# Patient Record
Sex: MALE | Race: BLACK OR AFRICAN AMERICAN | NOT HISPANIC OR LATINO | URBAN - METROPOLITAN AREA
[De-identification: names, ages, dates, MRNs, and addresses within clinical notes are randomized per-mention and may not be internally consistent; named-entity substitution may affect disease eponyms.]

---

## 2019-05-01 ENCOUNTER — OFFICE VISIT (OUTPATIENT)
Dept: FAMILY MEDICINE | Facility: CLINIC | Age: 59
End: 2019-05-01
Payer: COMMERCIAL

## 2019-05-01 VITALS
DIASTOLIC BLOOD PRESSURE: 80 MMHG | OXYGEN SATURATION: 97 % | BODY MASS INDEX: 26.51 KG/M2 | SYSTOLIC BLOOD PRESSURE: 110 MMHG | HEART RATE: 96 BPM | HEIGHT: 74 IN | WEIGHT: 206.56 LBS

## 2019-05-01 DIAGNOSIS — I10 ESSENTIAL HYPERTENSION: ICD-10-CM

## 2019-05-01 DIAGNOSIS — Z13.220 SCREENING FOR LIPID DISORDERS: ICD-10-CM

## 2019-05-01 DIAGNOSIS — R39.9 LOWER URINARY TRACT SYMPTOMS (LUTS): Primary | ICD-10-CM

## 2019-05-01 DIAGNOSIS — Z11.59 ENCOUNTER FOR HEPATITIS C SCREENING TEST FOR LOW RISK PATIENT: ICD-10-CM

## 2019-05-01 PROCEDURE — 99999 PR PBB SHADOW E&M-NEW PATIENT-LVL III: ICD-10-PCS | Mod: PBBFAC,,, | Performed by: INTERNAL MEDICINE

## 2019-05-01 PROCEDURE — 3074F SYST BP LT 130 MM HG: CPT | Mod: CPTII,S$GLB,, | Performed by: INTERNAL MEDICINE

## 2019-05-01 PROCEDURE — 3079F DIAST BP 80-89 MM HG: CPT | Mod: CPTII,S$GLB,, | Performed by: INTERNAL MEDICINE

## 2019-05-01 PROCEDURE — 3008F PR BODY MASS INDEX (BMI) DOCUMENTED: ICD-10-PCS | Mod: CPTII,S$GLB,, | Performed by: INTERNAL MEDICINE

## 2019-05-01 PROCEDURE — 3074F PR MOST RECENT SYSTOLIC BLOOD PRESSURE < 130 MM HG: ICD-10-PCS | Mod: CPTII,S$GLB,, | Performed by: INTERNAL MEDICINE

## 2019-05-01 PROCEDURE — 3008F BODY MASS INDEX DOCD: CPT | Mod: CPTII,S$GLB,, | Performed by: INTERNAL MEDICINE

## 2019-05-01 PROCEDURE — 3079F PR MOST RECENT DIASTOLIC BLOOD PRESSURE 80-89 MM HG: ICD-10-PCS | Mod: CPTII,S$GLB,, | Performed by: INTERNAL MEDICINE

## 2019-05-01 PROCEDURE — 99204 OFFICE O/P NEW MOD 45 MIN: CPT | Mod: S$GLB,,, | Performed by: INTERNAL MEDICINE

## 2019-05-01 PROCEDURE — 99204 PR OFFICE/OUTPT VISIT, NEW, LEVL IV, 45-59 MIN: ICD-10-PCS | Mod: S$GLB,,, | Performed by: INTERNAL MEDICINE

## 2019-05-01 PROCEDURE — 99999 PR PBB SHADOW E&M-NEW PATIENT-LVL III: CPT | Mod: PBBFAC,,, | Performed by: INTERNAL MEDICINE

## 2019-05-01 RX ORDER — AMLODIPINE BESYLATE 10 MG/1
10 TABLET ORAL DAILY
COMMUNITY
End: 2019-06-14 | Stop reason: SDUPTHER

## 2019-05-01 RX ORDER — TAMSULOSIN HYDROCHLORIDE 0.4 MG/1
0.4 CAPSULE ORAL DAILY
Qty: 30 CAPSULE | Refills: 11 | Status: SHIPPED | OUTPATIENT
Start: 2019-05-01 | End: 2020-03-18 | Stop reason: SDUPTHER

## 2019-05-01 RX ORDER — TRIAMTERENE AND HYDROCHLOROTHIAZIDE 37.5; 25 MG/1; MG/1
1 CAPSULE ORAL EVERY MORNING
COMMUNITY
End: 2019-06-14 | Stop reason: SDUPTHER

## 2019-05-01 NOTE — PROGRESS NOTES
Patient ID: Terence Bianchi is a 58 y.o. male.    Chief Complaint: Establish Care    HPI  From [place] and living there now.  2019. Has 4 children. 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     Dealing with accusations of giving STD to daughter.     PMH: HTN   Surg Hx:   FHx: mother with CAD and CVA. Dad passed from cancer.   Social Hx: smokes 3/4 ppd (15-20 yrs) , no etoh , no illcits    HTN:   Triamterene-HCTZ 37.5-25 mg qd  Amlodipine 10 mg qd  ACE-inhibitor--> lip swelling   BP well controlled  -continue current medication  -CMP, CBC, TSH, lipids    LUTS:   Has urgency and frequency:  -PSA  -will try tamsulosin 0.4 mg qd    Health maintenance:  Colonoscopy (1 yr ago in [place]) --> normal per patient    Social History     Socioeconomic History    Marital status:      Spouse name: Not on file    Number of children: Not on file    Years of education: Not on file    Highest education level: Not on file   Occupational History    Not on file   Social Needs    Financial resource strain: Not on file    Food insecurity:     Worry: Not on file     Inability: Not on file    Transportation needs:     Medical: Not on file     Non-medical: Not on file   Tobacco Use    Smoking status: Current Every Day Smoker   Substance and Sexual Activity    Alcohol use: Never     Frequency: Never    Drug use: Not Currently    Sexual activity: Not on file   Lifestyle    Physical activity:     Days per week: Not on file     Minutes per session: Not on file    Stress: Not on file   Relationships    Social connections:     Talks on phone: Not on file     Gets together: Not on file     Attends [employer] service: Not on file     Active member of club or organization: Not on file     Attends meetings of clubs or organizations: Not on file     Relationship status: Not on file   Other Topics Concern    Not on file   Social History Narrative    Not on file     No family history on file.  Current  Outpatient Medications on File Prior to Visit   Medication Sig Dispense Refill    amLODIPine (NORVASC) 10 MG tablet Take 10 mg by mouth once daily.      triamterene-hydrochlorothiazide 37.5-25 mg (DYAZIDE) 37.5-25 mg per capsule Take 1 capsule by mouth every morning.       No current facility-administered medications on file prior to visit.      I personally reviewed past medical, family and social history.  Review of Systems   Constitutional: Negative for activity change, fever and unexpected weight change.   HENT: Negative for facial swelling, hearing loss and trouble swallowing.    Eyes: Negative for visual disturbance.   Respiratory: Negative for cough, chest tightness, shortness of breath and wheezing.    Cardiovascular: Negative for chest pain, palpitations and leg swelling.   Gastrointestinal: Negative for abdominal pain, blood in stool, constipation, diarrhea, nausea and vomiting.   Endocrine: Negative for cold intolerance, polydipsia, polyphagia and polyuria.   Genitourinary: Negative for decreased urine volume and dysuria.   Musculoskeletal: Negative for gait problem and neck pain.   Skin: Negative for rash.   Neurological: Negative for dizziness, syncope and light-headedness.   Psychiatric/Behavioral: Negative for dysphoric mood. The patient is not nervous/anxious.        Objective:     Vitals:    05/01/19 1446   BP: 110/80   Pulse: 96        Physical Exam   Constitutional: He is oriented to person, place, and time. He appears well-developed and well-nourished. No distress.   HENT:   Head: Normocephalic and atraumatic.   Eyes: Pupils are equal, round, and reactive to light. EOM are normal. Right eye exhibits no discharge. Left eye exhibits no discharge. No scleral icterus.   Neck: Normal range of motion. Neck supple. No JVD present. No thyromegaly present.   Cardiovascular: Normal rate, regular rhythm and normal heart sounds. Exam reveals no gallop and no friction rub.   No murmur  heard.  Pulmonary/Chest: Effort normal and breath sounds normal. No respiratory distress. He has no wheezes.   Abdominal: Soft. Bowel sounds are normal. He exhibits no distension and no mass. There is no tenderness.   Musculoskeletal: Normal range of motion. He exhibits no edema.   Lymphadenopathy:     He has no cervical adenopathy.   Neurological: He is alert and oriented to person, place, and time. No cranial nerve deficit. Coordination normal.   Skin: Skin is warm and dry. Capillary refill takes less than 2 seconds. No rash noted. He is not diaphoretic.   Psychiatric: He has a normal mood and affect. His behavior is normal.       Assessment/Plan   Terence was seen today for establish care.    Diagnoses and all orders for this visit:    Lower urinary tract symptoms (LUTS)  -     tamsulosin (FLOMAX) 0.4 mg Cap; Take 1 capsule (0.4 mg total) by mouth once daily.  -     PSA, Screening; Future    Essential hypertension  -     CBC auto differential; Future  -     Comprehensive metabolic panel; Future  -     TSH; Future    Screening for lipid disorders  -     Lipid panel; Future

## 2019-05-02 ENCOUNTER — LAB VISIT (OUTPATIENT)
Dept: LAB | Facility: HOSPITAL | Age: 59
End: 2019-05-02
Attending: INTERNAL MEDICINE
Payer: COMMERCIAL

## 2019-05-02 DIAGNOSIS — I10 ESSENTIAL HYPERTENSION: ICD-10-CM

## 2019-05-02 DIAGNOSIS — R79.89 ELEVATED SERUM CREATININE: ICD-10-CM

## 2019-05-02 DIAGNOSIS — R94.4 DECREASED GFR: ICD-10-CM

## 2019-05-02 DIAGNOSIS — Z13.220 SCREENING FOR LIPID DISORDERS: ICD-10-CM

## 2019-05-02 DIAGNOSIS — R39.9 LOWER URINARY TRACT SYMPTOMS (LUTS): ICD-10-CM

## 2019-05-02 DIAGNOSIS — N17.9 AKI (ACUTE KIDNEY INJURY): Primary | ICD-10-CM

## 2019-05-02 LAB
ALBUMIN SERPL BCP-MCNC: 3.9 G/DL (ref 3.5–5.2)
ALP SERPL-CCNC: 59 U/L (ref 55–135)
ALT SERPL W/O P-5'-P-CCNC: 15 U/L (ref 10–44)
ANION GAP SERPL CALC-SCNC: 5 MMOL/L (ref 8–16)
AST SERPL-CCNC: 15 U/L (ref 10–40)
BASOPHILS # BLD AUTO: 0.05 K/UL (ref 0–0.2)
BASOPHILS NFR BLD: 0.5 % (ref 0–1.9)
BILIRUB SERPL-MCNC: 0.5 MG/DL (ref 0.1–1)
BUN SERPL-MCNC: 20 MG/DL (ref 8–23)
CALCIUM SERPL-MCNC: 10 MG/DL (ref 8.7–10.5)
CHLORIDE SERPL-SCNC: 105 MMOL/L (ref 95–110)
CHOLEST SERPL-MCNC: 182 MG/DL (ref 120–199)
CHOLEST/HDLC SERPL: 4.6 {RATIO} (ref 2–5)
CO2 SERPL-SCNC: 30 MMOL/L (ref 23–29)
COMPLEXED PSA SERPL-MCNC: 1.2 NG/ML (ref 0–4)
CREAT SERPL-MCNC: 1.6 MG/DL (ref 0.5–1.4)
DIFFERENTIAL METHOD: NORMAL
EOSINOPHIL # BLD AUTO: 0.1 K/UL (ref 0–0.5)
EOSINOPHIL NFR BLD: 1.1 % (ref 0–8)
ERYTHROCYTE [DISTWIDTH] IN BLOOD BY AUTOMATED COUNT: 14.5 % (ref 11.5–14.5)
EST. GFR  (AFRICAN AMERICAN): 53.3 ML/MIN/1.73 M^2
EST. GFR  (NON AFRICAN AMERICAN): 46.1 ML/MIN/1.73 M^2
GLUCOSE SERPL-MCNC: 102 MG/DL (ref 70–110)
HCT VFR BLD AUTO: 43.3 % (ref 40–54)
HDLC SERPL-MCNC: 40 MG/DL (ref 40–75)
HDLC SERPL: 22 % (ref 20–50)
HGB BLD-MCNC: 14.5 G/DL (ref 14–18)
IMM GRANULOCYTES # BLD AUTO: 0.03 K/UL (ref 0–0.04)
IMM GRANULOCYTES NFR BLD AUTO: 0.3 % (ref 0–0.5)
LDLC SERPL CALC-MCNC: 128.4 MG/DL (ref 63–159)
LYMPHOCYTES # BLD AUTO: 3.5 K/UL (ref 1–4.8)
LYMPHOCYTES NFR BLD: 34.8 % (ref 18–48)
MCH RBC QN AUTO: 30.4 PG (ref 27–31)
MCHC RBC AUTO-ENTMCNC: 33.5 G/DL (ref 32–36)
MCV RBC AUTO: 91 FL (ref 82–98)
MONOCYTES # BLD AUTO: 0.8 K/UL (ref 0.3–1)
MONOCYTES NFR BLD: 7.9 % (ref 4–15)
NEUTROPHILS # BLD AUTO: 5.6 K/UL (ref 1.8–7.7)
NEUTROPHILS NFR BLD: 55.4 % (ref 38–73)
NONHDLC SERPL-MCNC: 142 MG/DL
NRBC BLD-RTO: 0 /100 WBC
PLATELET # BLD AUTO: 270 K/UL (ref 150–350)
PMV BLD AUTO: 10.5 FL (ref 9.2–12.9)
POTASSIUM SERPL-SCNC: 4.4 MMOL/L (ref 3.5–5.1)
PROT SERPL-MCNC: 7.4 G/DL (ref 6–8.4)
RBC # BLD AUTO: 4.77 M/UL (ref 4.6–6.2)
SODIUM SERPL-SCNC: 140 MMOL/L (ref 136–145)
TRIGL SERPL-MCNC: 68 MG/DL (ref 30–150)
TSH SERPL DL<=0.005 MIU/L-ACNC: 0.57 UIU/ML (ref 0.4–4)
WBC # BLD AUTO: 10.13 K/UL (ref 3.9–12.7)

## 2019-05-02 PROCEDURE — 84443 ASSAY THYROID STIM HORMONE: CPT

## 2019-05-02 PROCEDURE — 80061 LIPID PANEL: CPT

## 2019-05-02 PROCEDURE — 84153 ASSAY OF PSA TOTAL: CPT

## 2019-05-02 PROCEDURE — 80053 COMPREHEN METABOLIC PANEL: CPT

## 2019-05-02 PROCEDURE — 36415 COLL VENOUS BLD VENIPUNCTURE: CPT | Mod: PO

## 2019-05-02 PROCEDURE — 85025 COMPLETE CBC W/AUTO DIFF WBC: CPT

## 2019-05-20 ENCOUNTER — PATIENT OUTREACH (OUTPATIENT)
Dept: ADMINISTRATIVE | Facility: HOSPITAL | Age: 59
End: 2019-05-20

## 2019-05-20 NOTE — PROGRESS NOTES
Health Maintenance Due   Topic Date Due    Hepatitis C Screening  1960    TETANUS VACCINE  05/02/1976    Pneumococcal Vaccine (Medium Risk) (1 of 1 - PPSV23) 05/02/1977    Shingles Vaccine (1 of 2) 05/02/2008     Chart review complete/scrubbed 05/20/2019  Future Appointments   Date Time Provider Department Center   6/3/2019  2:40 PM Juan Ramon Villa,  [facility] MED [place]   6/13/2019  1:30 PM ELA Gary, [facility] OPT[facility] [place]     Name of GI DR>?

## 2019-06-13 ENCOUNTER — OFFICE VISIT (OUTPATIENT)
Dept: OPTOMETRY | Facility: CLINIC | Age: 59
End: 2019-06-13
Payer: COMMERCIAL

## 2019-06-13 DIAGNOSIS — H52.203 HYPEROPIA OF BOTH EYES WITH ASTIGMATISM AND PRESBYOPIA: ICD-10-CM

## 2019-06-13 DIAGNOSIS — H52.4 HYPEROPIA OF BOTH EYES WITH ASTIGMATISM AND PRESBYOPIA: ICD-10-CM

## 2019-06-13 DIAGNOSIS — Z13.5 GLAUCOMA SCREENING: ICD-10-CM

## 2019-06-13 DIAGNOSIS — H52.03 HYPEROPIA OF BOTH EYES WITH ASTIGMATISM AND PRESBYOPIA: ICD-10-CM

## 2019-06-13 DIAGNOSIS — H35.033 HYPERTENSIVE RETINOPATHY OF BOTH EYES: Primary | ICD-10-CM

## 2019-06-13 DIAGNOSIS — H53.121 TRANSIENT VISUAL LOSS OF RIGHT EYE: ICD-10-CM

## 2019-06-13 PROCEDURE — 99999 PR PBB SHADOW E&M-EST. PATIENT-LVL III: ICD-10-PCS | Mod: PBBFAC,,, | Performed by: OPTOMETRIST

## 2019-06-13 PROCEDURE — 92004 PR EYE EXAM, NEW PATIENT,COMPREHESV: ICD-10-PCS | Mod: S$GLB,,, | Performed by: OPTOMETRIST

## 2019-06-13 PROCEDURE — 92004 COMPRE OPH EXAM NEW PT 1/>: CPT | Mod: S$GLB,,, | Performed by: OPTOMETRIST

## 2019-06-13 PROCEDURE — 99999 PR PBB SHADOW E&M-EST. PATIENT-LVL III: CPT | Mod: PBBFAC,,, | Performed by: OPTOMETRIST

## 2019-06-13 NOTE — PROGRESS NOTES
"HPI     Blurred Vision      Additional comments: blurred va  x 8 yrs//  Pt here for cat eval//  DLE   4 yrs ago//              Hypertensive Eye Exam      Additional comments: HTN exam// Pt had red line on on OD wnt to   optometrist was told had HTN,  went hazy it is much better now only little   hazy now//              Comments     blurred va  x 8 yrs//   Pt here for cat eval//    HTN exam// Pt had red line on on OD wnt to optometrist was told had HTN,    went hazy it is much better now only little hazy now//   No flashes or   floaters//    History of sub conj heme with multiple episodes over the last few years  No recent eye exam, but feels VA stable  Had 1 episode about 3 weeks ago of transient blur vision OD, it has   resolved and feels "back to normal"            Last edited by ELA Gary, OD on 6/13/2019  4:58 PM. (History)        ROS     Positive for: Eyes    Negative for: Constitutional, Gastrointestinal, Neurological, Skin,   Genitourinary, Musculoskeletal, HENT, Endocrine, Cardiovascular,   Respiratory, Psychiatric, Allergic/Imm, Heme/Lymph    Last edited by ELA Gary, OD on 6/13/2019  2:10 PM. (History)        Assessment /Plan     For exam results, see Encounter Report.    Hypertensive retinopathy of both eyes  -     Ambulatory Referral to Ophthalmology    Transient visual loss of right eye  -     Ambulatory Referral to Ophthalmology    Glaucoma screening    Hyperopia of both eyes with astigmatism and presbyopia      1,2. Vascular changes OU  OD w/ blot heme, cws and ? old brao near [address]  Resume BP meds (pt had stopped recently) and check BP daily at home   Call clinic with report if any further episode of reduced vision  To Dr Orellana for eval    3. Low suspect with moderate c/d and mid teens IOP, angles open  4. Updated refraction, no Rx needed for full time wear  Ok continue with otc only for near                     "

## 2019-06-14 ENCOUNTER — LAB VISIT (OUTPATIENT)
Dept: LAB | Facility: HOSPITAL | Age: 59
End: 2019-06-14
Attending: INTERNAL MEDICINE
Payer: COMMERCIAL

## 2019-06-14 ENCOUNTER — OFFICE VISIT (OUTPATIENT)
Dept: FAMILY MEDICINE | Facility: CLINIC | Age: 59
End: 2019-06-14
Payer: COMMERCIAL

## 2019-06-14 VITALS
BODY MASS INDEX: 27.22 KG/M2 | DIASTOLIC BLOOD PRESSURE: 90 MMHG | SYSTOLIC BLOOD PRESSURE: 130 MMHG | WEIGHT: 212.06 LBS | HEART RATE: 88 BPM | HEIGHT: 74 IN

## 2019-06-14 DIAGNOSIS — I10 ESSENTIAL HYPERTENSION: Primary | ICD-10-CM

## 2019-06-14 DIAGNOSIS — R39.9 LOWER URINARY TRACT SYMPTOMS (LUTS): ICD-10-CM

## 2019-06-14 DIAGNOSIS — N17.9 AKI (ACUTE KIDNEY INJURY): ICD-10-CM

## 2019-06-14 LAB
ALBUMIN SERPL BCP-MCNC: 3.9 G/DL (ref 3.5–5.2)
ANION GAP SERPL CALC-SCNC: 12 MMOL/L (ref 8–16)
BUN SERPL-MCNC: 16 MG/DL (ref 8–23)
CALCIUM SERPL-MCNC: 10.2 MG/DL (ref 8.7–10.5)
CHLORIDE SERPL-SCNC: 101 MMOL/L (ref 95–110)
CO2 SERPL-SCNC: 25 MMOL/L (ref 23–29)
CREAT SERPL-MCNC: 1.5 MG/DL (ref 0.5–1.4)
EST. GFR  (AFRICAN AMERICAN): 57.3 ML/MIN/1.73 M^2
EST. GFR  (NON AFRICAN AMERICAN): 49.5 ML/MIN/1.73 M^2
GLUCOSE SERPL-MCNC: 112 MG/DL (ref 70–110)
PHOSPHATE SERPL-MCNC: 3.7 MG/DL (ref 2.7–4.5)
POTASSIUM SERPL-SCNC: 4.3 MMOL/L (ref 3.5–5.1)
SODIUM SERPL-SCNC: 138 MMOL/L (ref 136–145)

## 2019-06-14 PROCEDURE — 99999 PR PBB SHADOW E&M-EST. PATIENT-LVL III: ICD-10-PCS | Mod: PBBFAC,,, | Performed by: INTERNAL MEDICINE

## 2019-06-14 PROCEDURE — 3075F SYST BP GE 130 - 139MM HG: CPT | Mod: CPTII,S$GLB,, | Performed by: INTERNAL MEDICINE

## 2019-06-14 PROCEDURE — 99214 OFFICE O/P EST MOD 30 MIN: CPT | Mod: S$GLB,,, | Performed by: INTERNAL MEDICINE

## 2019-06-14 PROCEDURE — 99999 PR PBB SHADOW E&M-EST. PATIENT-LVL III: CPT | Mod: PBBFAC,,, | Performed by: INTERNAL MEDICINE

## 2019-06-14 PROCEDURE — 3080F PR MOST RECENT DIASTOLIC BLOOD PRESSURE >= 90 MM HG: ICD-10-PCS | Mod: CPTII,S$GLB,, | Performed by: INTERNAL MEDICINE

## 2019-06-14 PROCEDURE — 3008F PR BODY MASS INDEX (BMI) DOCUMENTED: ICD-10-PCS | Mod: CPTII,S$GLB,, | Performed by: INTERNAL MEDICINE

## 2019-06-14 PROCEDURE — 3008F BODY MASS INDEX DOCD: CPT | Mod: CPTII,S$GLB,, | Performed by: INTERNAL MEDICINE

## 2019-06-14 PROCEDURE — 3075F PR MOST RECENT SYSTOLIC BLOOD PRESS GE 130-139MM HG: ICD-10-PCS | Mod: CPTII,S$GLB,, | Performed by: INTERNAL MEDICINE

## 2019-06-14 PROCEDURE — 3080F DIAST BP >= 90 MM HG: CPT | Mod: CPTII,S$GLB,, | Performed by: INTERNAL MEDICINE

## 2019-06-14 PROCEDURE — 99214 PR OFFICE/OUTPT VISIT, EST, LEVL IV, 30-39 MIN: ICD-10-PCS | Mod: S$GLB,,, | Performed by: INTERNAL MEDICINE

## 2019-06-14 PROCEDURE — 36415 COLL VENOUS BLD VENIPUNCTURE: CPT | Mod: PO

## 2019-06-14 PROCEDURE — 80069 RENAL FUNCTION PANEL: CPT

## 2019-06-14 RX ORDER — TRIAMTERENE AND HYDROCHLOROTHIAZIDE 37.5; 25 MG/1; MG/1
1 CAPSULE ORAL EVERY MORNING
Qty: 90 CAPSULE | Refills: 1 | Status: SHIPPED | OUTPATIENT
Start: 2019-06-14 | End: 2019-10-08 | Stop reason: SDUPTHER

## 2019-06-14 RX ORDER — AMLODIPINE BESYLATE 10 MG/1
10 TABLET ORAL DAILY
Qty: 90 TABLET | Refills: 1 | Status: SHIPPED | OUTPATIENT
Start: 2019-06-14 | End: 2019-10-08 | Stop reason: SDUPTHER

## 2019-06-14 NOTE — PROGRESS NOTES
Subjective:       Patient ID: Terence Bianchi is a 59 y.o. male.    Chief Complaint: Hypertension    HPI     From [place] and living there now.  2019. Has 4 children. 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     On last visit started patient on tamsulosin for lower urinary tract symptoms of urgency and frequency.  Since last visit patient is seen Optometry who referred him to Ophthalmology for hypertensive retinopathy and transient blurry vision of right eye.  Initial labs showed creatinine of 1.6.     CHAYITO/CKD:  Creatinine 1.6 05/02/2019  States that he does not drink much fluids throughout the day.   Takes ibuprofen occasionally.  Will repeat renal function today  Treat BP.   Avoid excessive NSAIDs, renal dose medications    HTN:   Triamterene-HCTZ 37.5-25 mg qd  Amlodipine 10 mg qd  ACE-inhibitor--> lip swelling   continue current medication  States he has not been taking everyday.  Blood pressure elevated today.   Refilled meds today.    LUTS:   Has urgency and frequency:  Improved on tamsulosin.     Tobacco Use:   Smokes more on job.  Looking into trying the juul.     Health maintenance:  Colonoscopy (1 yr ago in [place]) --> normal per patient  Current Outpatient Medications on File Prior to Visit   Medication Sig Dispense Refill    tamsulosin (FLOMAX) 0.4 mg Cap Take 1 capsule (0.4 mg total) by mouth once daily. 30 capsule 11    [DISCONTINUED] amLODIPine (NORVASC) 10 MG tablet Take 10 mg by mouth once daily.      [DISCONTINUED] triamterene-hydrochlorothiazide 37.5-25 mg (DYAZIDE) 37.5-25 mg per capsule Take 1 capsule by mouth every morning.       No current facility-administered medications on file prior to visit.      I personally reviewed past medical, family and social history.  Review of Systems   Constitutional: Negative for activity change, fever and unexpected weight change.   HENT: Negative for facial swelling, hearing loss and trouble swallowing.    Eyes: Positive for visual  disturbance.   Respiratory: Negative for cough, chest tightness, shortness of breath and wheezing.    Cardiovascular: Negative for chest pain, palpitations and leg swelling.   Gastrointestinal: Negative for abdominal pain, blood in stool, constipation, diarrhea, nausea and vomiting.   Endocrine: Negative for cold intolerance, polydipsia, polyphagia and polyuria.   Genitourinary: Negative for decreased urine volume and dysuria.   Musculoskeletal: Negative for gait problem and neck pain.   Skin: Negative for rash.   Neurological: Negative for dizziness, syncope and light-headedness.   Psychiatric/Behavioral: Negative for dysphoric mood. The patient is not nervous/anxious.        Objective:     Vitals:    06/14/19 1335   BP: (!) 130/90   Pulse: 88        Physical Exam   Constitutional: He is oriented to person, place, and time. He appears well-developed and well-nourished. No distress.   HENT:   Head: Normocephalic and atraumatic.   Eyes: Pupils are equal, round, and reactive to light. EOM are normal. Right eye exhibits no discharge. Left eye exhibits no discharge. No scleral icterus.   Neck: Normal range of motion. Neck supple. No JVD present. No thyromegaly present.   Cardiovascular: Normal rate, regular rhythm and normal heart sounds. Exam reveals no gallop and no friction rub.   No murmur heard.  Pulmonary/Chest: Effort normal and breath sounds normal. No respiratory distress. He has no wheezes.   Abdominal: Soft. Bowel sounds are normal. He exhibits no distension and no mass. There is no tenderness.   Musculoskeletal: Normal range of motion. He exhibits no edema.   Lymphadenopathy:     He has no cervical adenopathy.   Neurological: He is alert and oriented to person, place, and time. No cranial nerve deficit. Coordination normal.   Skin: Skin is warm and dry. Capillary refill takes less than 2 seconds. No rash noted. He is not diaphoretic.   Psychiatric: He has a normal mood and affect. His behavior is normal.        Assessment/Plan   Terence was seen today for hypertension.    Diagnoses and all orders for this visit:    Essential hypertension  -     triamterene-hydrochlorothiazide 37.5-25 mg (DYAZIDE) 37.5-25 mg per capsule; Take 1 capsule by mouth every morning.  -     amLODIPine (NORVASC) 10 MG tablet; Take 1 tablet (10 mg total) by mouth once daily.    CHAYITO (acute kidney injury)  -     Renal function panel; Future    Lower urinary tract symptoms (LUTS)

## 2019-06-21 ENCOUNTER — INITIAL CONSULT (OUTPATIENT)
Dept: OPHTHALMOLOGY | Facility: CLINIC | Age: 59
End: 2019-06-21
Payer: COMMERCIAL

## 2019-06-21 DIAGNOSIS — H25.13 AGE-RELATED NUCLEAR CATARACT OF BOTH EYES: ICD-10-CM

## 2019-06-21 DIAGNOSIS — H35.031: ICD-10-CM

## 2019-06-21 DIAGNOSIS — H34.8311 BRANCH RETINAL VEIN OCCLUSION OF RIGHT EYE WITH RETINAL NEOVASCULARIZATION: Primary | ICD-10-CM

## 2019-06-21 PROCEDURE — 99999 PR PBB SHADOW E&M-EST. PATIENT-LVL III: ICD-10-PCS | Mod: PBBFAC,,, | Performed by: OPHTHALMOLOGY

## 2019-06-21 PROCEDURE — 92004 PR EYE EXAM, NEW PATIENT,COMPREHESV: ICD-10-PCS | Mod: S$GLB,,, | Performed by: OPHTHALMOLOGY

## 2019-06-21 PROCEDURE — 99999 PR PBB SHADOW E&M-EST. PATIENT-LVL III: CPT | Mod: PBBFAC,,, | Performed by: OPHTHALMOLOGY

## 2019-06-21 PROCEDURE — 92004 COMPRE OPH EXAM NEW PT 1/>: CPT | Mod: S$GLB,,, | Performed by: OPHTHALMOLOGY

## 2019-06-21 NOTE — PROGRESS NOTES
HPI     Hypertensive Retinopathy      Additional comments: Hypertensive retinopathy per Dr Gary              Comments     DLS: 6/13/19    Pt states x 3 wks ago had some very blurry va OD. Doing better now.           Last edited by Cheryle Quintana on 6/21/2019  2:26 PM. (History)        ROS     Positive for: Eyes    Negative for: Constitutional, Gastrointestinal, Neurological, Skin,   Genitourinary, Musculoskeletal, HENT, Endocrine, Cardiovascular,   Respiratory, Psychiatric, Allergic/Imm, Heme/Lymph    Last edited by Loi Orellana Jr., MD on 6/21/2019  3:12 PM. (History)        Assessment /Plan     For exam results, see Encounter Report.    Branch retinal vein occlusion of right eye with retinal neovascularization- ? NVE OD inferior ? Pre-retinal heme  -     Ambulatory referral to Ophthalmology  - No NVI, evaluate for possible proliferative changes  Age-related nuclear cataract of both eyes  -no decrease in ADLS, no significant glare, and functionality is satisfactory  -counseled on what to expect if the cataracts worsening and how it can effect the vision  -continue to monitor and if vision changes patient can call for another appointment  Hypertensive retinopathy of right eye, grade 3  -Av nicking  -optimize blood pressure control  Follow up in about 3 weeks (around 7/12/2019) for referral retina BRVO with ?NVE.

## 2019-06-21 NOTE — LETTER
June 21, 2019      ELA Gary, OD  [address] [facility] [address] [place] [address]           [place] - Op[facility]  [address] [facility] [address] [place] [address]  Phone: [phone]  Fax: [phone]          Patient: Terence Bianchi   MR Number: [id number]   YOB: 1960   Date of Visit: 6/21/2019       Dear Dr. ELA Gary:    Thank you for referring Terence Bianchi to me for evaluation. Attached you will find relevant portions of my assessment and plan of care.    If you have questions, please do not hesitate to call me. I look forward to following Terence Biancih along with you.    Sincerely,    Loi Orellana Jr., MD    Enclosure  CC:  No Recipients    If you would like to receive this communication electronically, please contact [email or web address] or [phone] to request more information on [employer] Link access.    For providers and/or their staff who would like to refer a patient to [facility], please contact us through our one-stop-shop provider referral line, [facility], at [phone].    If you feel you have received this communication in error or would no longer like to receive these types of communications, please e-mail [email or web address]

## 2019-07-01 ENCOUNTER — OFFICE VISIT (OUTPATIENT)
Dept: OPHTHALMOLOGY | Facility: CLINIC | Age: 59
End: 2019-07-01
Payer: COMMERCIAL

## 2019-07-01 DIAGNOSIS — H35.033 HYPERTENSIVE RETINOPATHY, BILATERAL: ICD-10-CM

## 2019-07-01 DIAGNOSIS — H34.8312 STABLE BRANCH RETINAL VEIN OCCLUSION OF RIGHT EYE: Primary | ICD-10-CM

## 2019-07-01 DIAGNOSIS — H25.13 NS (NUCLEAR SCLEROSIS), BILATERAL: ICD-10-CM

## 2019-07-01 PROCEDURE — 92014 PR EYE EXAM, EST PATIENT,COMPREHESV: ICD-10-PCS | Mod: S$GLB,,, | Performed by: OPHTHALMOLOGY

## 2019-07-01 PROCEDURE — 92134 CPTRZ OPH DX IMG PST SGM RTA: CPT | Mod: S$GLB,,, | Performed by: OPHTHALMOLOGY

## 2019-07-01 PROCEDURE — 92134 POSTERIOR SEGMENT OCT RETINA (OCULAR COHERENCE TOMOGRAPHY)-BOTH EYES: ICD-10-PCS | Mod: S$GLB,,, | Performed by: OPHTHALMOLOGY

## 2019-07-01 PROCEDURE — 92226 PR SPECIAL EYE EXAM, SUBSEQUENT: ICD-10-PCS | Mod: RT,S$GLB,, | Performed by: OPHTHALMOLOGY

## 2019-07-01 PROCEDURE — 92014 COMPRE OPH EXAM EST PT 1/>: CPT | Mod: S$GLB,,, | Performed by: OPHTHALMOLOGY

## 2019-07-01 PROCEDURE — 99999 PR PBB SHADOW E&M-EST. PATIENT-LVL II: CPT | Mod: PBBFAC,,, | Performed by: OPHTHALMOLOGY

## 2019-07-01 PROCEDURE — 92226 PR SPECIAL EYE EXAM, SUBSEQUENT: CPT | Mod: RT,S$GLB,, | Performed by: OPHTHALMOLOGY

## 2019-07-01 PROCEDURE — 99999 PR PBB SHADOW E&M-EST. PATIENT-LVL II: ICD-10-PCS | Mod: PBBFAC,,, | Performed by: OPHTHALMOLOGY

## 2019-07-01 RX ORDER — FLUORESCEIN 500 MG/ML
5 INJECTION INTRAVENOUS ONCE
Status: DISCONTINUED | OUTPATIENT
Start: 2019-07-01 | End: 2020-03-18 | Stop reason: ALTCHOICE

## 2019-07-01 NOTE — PROGRESS NOTES
HPI     Concerns About Ocular Health      Additional comments: brvo od         OCT - OD - superior atrophy - no significant CME  OS - No ME      A/P    1. BRVO OD  Chronic - no CME  Some collaterals, no obvious NV    2. HTN Ret OU  BP control    3. Early NS OU      6 months OCT/FA OD

## 2019-07-01 NOTE — LETTER
July 1, 2019      Loi Orellana Jr., MD  [address] [facility] [address] [place] [address]           [facility]  [address] [facility] [address] [place] [address]  Phone: [phone]  Fax: [phone]          Patient: Terence Bianchi   MR Number: [id number]   YOB: 1960   Date of Visit: 7/1/2019       Dear Dr. Loi Orellana Jr.:    Thank you for referring Terence Bianchi to me for evaluation. Attached you will find relevant portions of my assessment and plan of care.    If you have questions, please do not hesitate to call me. I look forward to following Terence Bianchi along with you.    Sincerely,    PAM Frey MD    Enclosure  CC:  No Recipients    If you would like to receive this communication electronically, please contact [email or web address] or [phone] to request more information on [employer] Link access.    For providers and/or their staff who would like to refer a patient to [facility], please contact us through our one-stop-shop provider referral line, [facility], at [phone].    If you feel you have received this communication in error or would no longer like to receive these types of communications, please e-mail [email or web address]

## 2019-10-08 DIAGNOSIS — I10 ESSENTIAL HYPERTENSION: ICD-10-CM

## 2019-10-08 RX ORDER — TRIAMTERENE AND HYDROCHLOROTHIAZIDE 37.5; 25 MG/1; MG/1
CAPSULE ORAL
Qty: 90 CAPSULE | Refills: 1 | Status: SHIPPED | OUTPATIENT
Start: 2019-10-08 | End: 2020-03-18 | Stop reason: SDUPTHER

## 2019-10-08 RX ORDER — AMLODIPINE BESYLATE 10 MG/1
TABLET ORAL
Qty: 90 TABLET | Refills: 1 | Status: SHIPPED | OUTPATIENT
Start: 2019-10-08 | End: 2020-03-18 | Stop reason: SDUPTHER

## 2020-03-05 ENCOUNTER — TELEPHONE (OUTPATIENT)
Dept: ADMINISTRATIVE | Facility: HOSPITAL | Age: 60
End: 2020-03-05

## 2020-03-05 ENCOUNTER — PATIENT OUTREACH (OUTPATIENT)
Dept: ADMINISTRATIVE | Facility: HOSPITAL | Age: 60
End: 2020-03-05

## 2020-03-05 NOTE — PROGRESS NOTES
Chart review completed 03/05/2020.  Care Everywhere updates requested and reviewed.  Immunizations reconciled. Media reviewed.      updated with external Colonoscopy report.

## 2020-03-17 ENCOUNTER — TELEPHONE (OUTPATIENT)
Dept: FAMILY MEDICINE | Facility: CLINIC | Age: 60
End: 2020-03-17

## 2020-03-18 ENCOUNTER — TELEPHONE (OUTPATIENT)
Dept: FAMILY MEDICINE | Facility: CLINIC | Age: 60
End: 2020-03-18

## 2020-03-18 ENCOUNTER — LAB VISIT (OUTPATIENT)
Dept: LAB | Facility: HOSPITAL | Age: 60
End: 2020-03-18
Attending: INTERNAL MEDICINE
Payer: COMMERCIAL

## 2020-03-18 ENCOUNTER — DOCUMENTATION ONLY (OUTPATIENT)
Dept: FAMILY MEDICINE | Facility: CLINIC | Age: 60
End: 2020-03-18

## 2020-03-18 ENCOUNTER — OFFICE VISIT (OUTPATIENT)
Dept: FAMILY MEDICINE | Facility: CLINIC | Age: 60
End: 2020-03-18
Payer: COMMERCIAL

## 2020-03-18 VITALS
HEIGHT: 74 IN | BODY MASS INDEX: 25.1 KG/M2 | HEART RATE: 95 BPM | WEIGHT: 195.56 LBS | DIASTOLIC BLOOD PRESSURE: 84 MMHG | TEMPERATURE: 99 F | OXYGEN SATURATION: 95 % | SYSTOLIC BLOOD PRESSURE: 116 MMHG

## 2020-03-18 DIAGNOSIS — N17.9 AKI (ACUTE KIDNEY INJURY): ICD-10-CM

## 2020-03-18 DIAGNOSIS — Z11.4 SCREENING FOR HIV (HUMAN IMMUNODEFICIENCY VIRUS): ICD-10-CM

## 2020-03-18 DIAGNOSIS — I10 ESSENTIAL HYPERTENSION: ICD-10-CM

## 2020-03-18 DIAGNOSIS — K59.00 CONSTIPATION, UNSPECIFIED CONSTIPATION TYPE: Primary | ICD-10-CM

## 2020-03-18 DIAGNOSIS — Z12.11 SCREENING FOR COLON CANCER: ICD-10-CM

## 2020-03-18 DIAGNOSIS — Z12.5 SCREENING FOR PROSTATE CANCER: ICD-10-CM

## 2020-03-18 DIAGNOSIS — R39.9 LOWER URINARY TRACT SYMPTOMS (LUTS): ICD-10-CM

## 2020-03-18 DIAGNOSIS — Z91.89 RISK FOR CORONARY ARTERY DISEASE GREATER THAN 20% IN NEXT 10 YEARS: ICD-10-CM

## 2020-03-18 DIAGNOSIS — Z23 NEED FOR PNEUMOCOCCAL VACCINATION: ICD-10-CM

## 2020-03-18 LAB
ALBUMIN SERPL BCP-MCNC: 3.8 G/DL (ref 3.5–5.2)
ALP SERPL-CCNC: 65 U/L (ref 55–135)
ALT SERPL W/O P-5'-P-CCNC: 13 U/L (ref 10–44)
ANION GAP SERPL CALC-SCNC: 10 MMOL/L (ref 8–16)
AST SERPL-CCNC: 15 U/L (ref 10–40)
BASOPHILS # BLD AUTO: 0.05 K/UL (ref 0–0.2)
BASOPHILS NFR BLD: 0.4 % (ref 0–1.9)
BILIRUB SERPL-MCNC: 0.3 MG/DL (ref 0.1–1)
BUN SERPL-MCNC: 14 MG/DL (ref 8–23)
CALCIUM SERPL-MCNC: 10.1 MG/DL (ref 8.7–10.5)
CHLORIDE SERPL-SCNC: 100 MMOL/L (ref 95–110)
CO2 SERPL-SCNC: 29 MMOL/L (ref 23–29)
COMPLEXED PSA SERPL-MCNC: 1.1 NG/ML (ref 0–4)
CREAT SERPL-MCNC: 1.9 MG/DL (ref 0.5–1.4)
DIFFERENTIAL METHOD: ABNORMAL
EOSINOPHIL # BLD AUTO: 0.1 K/UL (ref 0–0.5)
EOSINOPHIL NFR BLD: 1 % (ref 0–8)
ERYTHROCYTE [DISTWIDTH] IN BLOOD BY AUTOMATED COUNT: 13.5 % (ref 11.5–14.5)
EST. GFR  (AFRICAN AMERICAN): 43 ML/MIN/1.73 M^2
EST. GFR  (NON AFRICAN AMERICAN): 37.2 ML/MIN/1.73 M^2
GLUCOSE SERPL-MCNC: 96 MG/DL (ref 70–110)
HCT VFR BLD AUTO: 46.5 % (ref 40–54)
HGB BLD-MCNC: 14.9 G/DL (ref 14–18)
IMM GRANULOCYTES # BLD AUTO: 0.05 K/UL (ref 0–0.04)
IMM GRANULOCYTES NFR BLD AUTO: 0.4 % (ref 0–0.5)
LYMPHOCYTES # BLD AUTO: 4.4 K/UL (ref 1–4.8)
LYMPHOCYTES NFR BLD: 35.4 % (ref 18–48)
MCH RBC QN AUTO: 29.5 PG (ref 27–31)
MCHC RBC AUTO-ENTMCNC: 32 G/DL (ref 32–36)
MCV RBC AUTO: 92 FL (ref 82–98)
MONOCYTES # BLD AUTO: 1 K/UL (ref 0.3–1)
MONOCYTES NFR BLD: 7.7 % (ref 4–15)
NEUTROPHILS # BLD AUTO: 6.9 K/UL (ref 1.8–7.7)
NEUTROPHILS NFR BLD: 55.1 % (ref 38–73)
NRBC BLD-RTO: 0 /100 WBC
PLATELET # BLD AUTO: 319 K/UL (ref 150–350)
PMV BLD AUTO: 9.8 FL (ref 9.2–12.9)
POTASSIUM SERPL-SCNC: 4.4 MMOL/L (ref 3.5–5.1)
PROT SERPL-MCNC: 8.1 G/DL (ref 6–8.4)
RBC # BLD AUTO: 5.05 M/UL (ref 4.6–6.2)
SODIUM SERPL-SCNC: 139 MMOL/L (ref 136–145)
WBC # BLD AUTO: 12.43 K/UL (ref 3.9–12.7)

## 2020-03-18 PROCEDURE — 36415 COLL VENOUS BLD VENIPUNCTURE: CPT | Mod: PO

## 2020-03-18 PROCEDURE — 90471 PNEUMOCOCCAL POLYSACCHARIDE VACCINE 23-VALENT =>2YO SQ IM: ICD-10-PCS | Mod: S$GLB,,, | Performed by: INTERNAL MEDICINE

## 2020-03-18 PROCEDURE — 99999 PR PBB SHADOW E&M-EST. PATIENT-LVL III: ICD-10-PCS | Mod: PBBFAC,,, | Performed by: INTERNAL MEDICINE

## 2020-03-18 PROCEDURE — 99214 OFFICE O/P EST MOD 30 MIN: CPT | Mod: 25,S$GLB,, | Performed by: INTERNAL MEDICINE

## 2020-03-18 PROCEDURE — 3079F DIAST BP 80-89 MM HG: CPT | Mod: CPTII,S$GLB,, | Performed by: INTERNAL MEDICINE

## 2020-03-18 PROCEDURE — 85025 COMPLETE CBC W/AUTO DIFF WBC: CPT

## 2020-03-18 PROCEDURE — 80053 COMPREHEN METABOLIC PANEL: CPT

## 2020-03-18 PROCEDURE — 3008F BODY MASS INDEX DOCD: CPT | Mod: CPTII,S$GLB,, | Performed by: INTERNAL MEDICINE

## 2020-03-18 PROCEDURE — 3074F SYST BP LT 130 MM HG: CPT | Mod: CPTII,S$GLB,, | Performed by: INTERNAL MEDICINE

## 2020-03-18 PROCEDURE — 90471 IMMUNIZATION ADMIN: CPT | Mod: S$GLB,,, | Performed by: INTERNAL MEDICINE

## 2020-03-18 PROCEDURE — 3079F PR MOST RECENT DIASTOLIC BLOOD PRESSURE 80-89 MM HG: ICD-10-PCS | Mod: CPTII,S$GLB,, | Performed by: INTERNAL MEDICINE

## 2020-03-18 PROCEDURE — 90732 PNEUMOCOCCAL POLYSACCHARIDE VACCINE 23-VALENT =>2YO SQ IM: ICD-10-PCS | Mod: S$GLB,,, | Performed by: INTERNAL MEDICINE

## 2020-03-18 PROCEDURE — 90732 PPSV23 VACC 2 YRS+ SUBQ/IM: CPT | Mod: S$GLB,,, | Performed by: INTERNAL MEDICINE

## 2020-03-18 PROCEDURE — 99214 PR OFFICE/OUTPT VISIT, EST, LEVL IV, 30-39 MIN: ICD-10-PCS | Mod: 25,S$GLB,, | Performed by: INTERNAL MEDICINE

## 2020-03-18 PROCEDURE — 86703 HIV-1/HIV-2 1 RESULT ANTBDY: CPT

## 2020-03-18 PROCEDURE — 3008F PR BODY MASS INDEX (BMI) DOCUMENTED: ICD-10-PCS | Mod: CPTII,S$GLB,, | Performed by: INTERNAL MEDICINE

## 2020-03-18 PROCEDURE — 3074F PR MOST RECENT SYSTOLIC BLOOD PRESSURE < 130 MM HG: ICD-10-PCS | Mod: CPTII,S$GLB,, | Performed by: INTERNAL MEDICINE

## 2020-03-18 PROCEDURE — 84153 ASSAY OF PSA TOTAL: CPT

## 2020-03-18 PROCEDURE — 99999 PR PBB SHADOW E&M-EST. PATIENT-LVL III: CPT | Mod: PBBFAC,,, | Performed by: INTERNAL MEDICINE

## 2020-03-18 RX ORDER — ATORVASTATIN CALCIUM 10 MG/1
10 TABLET, FILM COATED ORAL DAILY
Qty: 30 TABLET | Refills: 1 | Status: SHIPPED | OUTPATIENT
Start: 2020-03-18 | End: 2020-06-18 | Stop reason: SDUPTHER

## 2020-03-18 RX ORDER — TAMSULOSIN HYDROCHLORIDE 0.4 MG/1
0.4 CAPSULE ORAL DAILY
Qty: 90 CAPSULE | Refills: 1 | Status: SHIPPED | OUTPATIENT
Start: 2020-03-18 | End: 2020-09-18 | Stop reason: SDUPTHER

## 2020-03-18 RX ORDER — TRIAMTERENE AND HYDROCHLOROTHIAZIDE 37.5; 25 MG/1; MG/1
1 CAPSULE ORAL DAILY
Qty: 90 CAPSULE | Refills: 1 | Status: SHIPPED | OUTPATIENT
Start: 2020-03-18 | End: 2020-09-18 | Stop reason: SDUPTHER

## 2020-03-18 RX ORDER — AMLODIPINE BESYLATE 10 MG/1
10 TABLET ORAL DAILY
Qty: 90 TABLET | Refills: 1 | Status: SHIPPED | OUTPATIENT
Start: 2020-03-18 | End: 2020-09-18 | Stop reason: SDUPTHER

## 2020-03-18 NOTE — PROGRESS NOTES
From [place] and living there now.  2019. Has 4 children. 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     Here for follow-up.    PMH and A/P:  Problem  Assessment Plan Hx/Notes   CHAYITO/CKD CKD stage IIIA? CMP today.  Avoid NSAIDs and renal dose medications    Hypertension  Continue Triamterene-hydrochlorothiazide and amlodipine    LUTS  Tamsulosin    ASCVD 24% Discussed starting statin today    Tobacco use                    -pneumococcal vaccine  -HIV screening  -shingles vaccine  -PSA, CBC, CMP today  -lipid panel in 1 month

## 2020-03-18 NOTE — TELEPHONE ENCOUNTER
----- Message from Conchita Antonio sent at 3/18/2020 11:30 AM CDT -----  Type:  Patient Returning Call    Who Called:  Patient   Who Left Message for Patient:  Chitra Andrews  Does the patient know what this is regarding?:  Appointment today  Best Call Back Number:  [phone] (home)     Additional Information:  Patient states he will keep his appointment today has other questions, does not need a call back

## 2020-03-18 NOTE — TELEPHONE ENCOUNTER
----- Message from Stacy Becker sent at 3/18/2020  7:58 AM CDT -----  Contact: self  Patient has an appt today and wants to know if he will be seen, call back to advise at [phone] (home).  If he can get his BP medicine refilled he can/will reschedule which ever way you want to do it. Thanks

## 2020-03-18 NOTE — PROGRESS NOTES
Subjective:       Patient ID: Terence Bianchi is a 59 y.o. male.    Chief Complaint: Medication Refill and GI Problem (digestive issues(gas))    HPI     From [place] and living there now.  2019. Has 4 children. 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     Here for follow-up. BP has been running 130/80s. He has been having intermittent constipation. Has associated gas. Denies acid reflux. He is not having BMs daily. May not be eating enough fiber or drinking enough water. No blood in stool. Last C scope in 2017 (polyps), repeat rec for 3 yrs.   -increase fiber and water intake  -repeat C-scope.     PMH and A/P:  Problem  Assessment Plan Hx/Notes   CHAYITO/CKD CKD stage IIIA? CMP today.  Avoid NSAIDs and renal dose medications    Hypertension controlled Continue Triamterene-hydrochlorothiazide and amlodipine    LUTS Controlled  Tamsulosin    ASCVD risk  > 20%: HTN, tobacco Starting atorvastatin 10 mg, lipids in 1 month.     Tobacco use Smokes 1/2 ppd.  Has cut back, discussed cessation.             -pneumococcal vaccine:   -HIV screening   -shingles vaccine  -PSA, CBC, CMP today  -lipid panel in 1 month    Current Outpatient Medications on File Prior to Visit   Medication Sig Dispense Refill    [DISCONTINUED] amLODIPine (NORVASC) 10 MG tablet TAKE 1 TABLET BY MOUTH ONCE DAILY 90 tablet 1    [DISCONTINUED] tamsulosin (FLOMAX) 0.4 mg Cap Take 1 capsule (0.4 mg total) by mouth once daily. 30 capsule 11    [DISCONTINUED] triamterene-hydrochlorothiazide 37.5-25 mg (DYAZIDE) 37.5-25 mg per capsule TAKE 1 CAPSULE BY MOUTH ONCE DAILY IN THE MORNING 90 capsule 1     Current Facility-Administered Medications on File Prior to Visit   Medication Dose Route Frequency Provider Last Rate Last Dose    [DISCONTINUED] fluorescein 500 mg/5 mL (10 %) injection 500 mg  5 mL Intravenous Once D. Sohail Frey MD         I personally reviewed past medical, family and social history.  Review of Systems    Constitutional: Negative for activity change and fever.   HENT: Negative for sore throat and trouble swallowing.    Eyes: Negative for pain and visual disturbance.   Respiratory: Negative for cough, shortness of breath and wheezing.    Cardiovascular: Negative for chest pain, palpitations and leg swelling.   Gastrointestinal: Positive for constipation. Negative for abdominal pain, blood in stool, diarrhea, nausea and vomiting.   Endocrine: Negative for cold intolerance and polyuria.   Genitourinary: Negative for decreased urine volume and dysuria.   Musculoskeletal: Negative for gait problem and neck pain.   Skin: Negative for rash.   Neurological: Negative for dizziness, syncope and light-headedness.   Psychiatric/Behavioral: Negative for dysphoric mood. The patient is not nervous/anxious.          Objective:     Vitals:    03/18/20 1449   BP: 116/84   Pulse: 95   Temp: 99 °F (37.2 °C)        Physical Exam   Constitutional: He is oriented to person, place, and time. He appears well-developed and well-nourished. No distress.   HENT:   Head: Normocephalic and atraumatic.   Eyes: Pupils are equal, round, and reactive to light. EOM are normal. Right eye exhibits no discharge. Left eye exhibits no discharge. No scleral icterus.   Neck: Normal range of motion. Neck supple. No JVD present. No thyromegaly present.   Cardiovascular: Normal rate, regular rhythm and normal heart sounds. Exam reveals no gallop and no friction rub.   No murmur heard.  Pulmonary/Chest: Effort normal and breath sounds normal. No respiratory distress. He has no wheezes.   Abdominal: Soft. Bowel sounds are normal. He exhibits no distension and no mass. There is no tenderness.   Musculoskeletal: Normal range of motion. He exhibits no edema.   Lymphadenopathy:     He has no cervical adenopathy.   Neurological: He is alert and oriented to person, place, and time. No cranial nerve deficit. Coordination normal.   Skin: Skin is warm and dry. Capillary  refill takes less than 2 seconds. No rash noted. He is not diaphoretic.   Psychiatric: He has a normal mood and affect. His behavior is normal.         Assessment/Plan   Terence was seen today for medication refill and gi problem.    Diagnoses and all orders for this visit:    Constipation, unspecified constipation type    Essential hypertension  -     CBC auto differential; Future  -     Comprehensive metabolic panel; Future  -     amLODIPine (NORVASC) 10 MG tablet; Take 1 tablet (10 mg total) by mouth once daily.  -     triamterene-hydrochlorothiazide 37.5-25 mg (DYAZIDE) 37.5-25 mg per capsule; Take 1 capsule by mouth once daily.    Risk for coronary artery disease greater than 20% in next 10 years  -     Lipid panel; Future  -     atorvastatin (LIPITOR) 10 MG tablet; Take 1 tablet (10 mg total) by mouth once daily.    Lower urinary tract symptoms (LUTS)  -     tamsulosin (FLOMAX) 0.4 mg Cap; Take 1 capsule (0.4 mg total) by mouth once daily.    CHAYITO (acute kidney injury)  -     CBC auto differential; Future  -     Comprehensive metabolic panel; Future    Screening for prostate cancer  -     PSA, Screening; Future    Screening for HIV (human immunodeficiency virus)  -     HIV 1/2 Ag/Ab (4th Gen); Future    Screening for colon cancer  -     Case request GI: COLONOSCOPY    Need for pneumococcal vaccination  -     Pneumococcal Polysaccharide Vaccine (23 Valent) (SQ/IM)

## 2020-03-18 NOTE — PATIENT INSTRUCTIONS
OTC medications for constipation.  Colace at night before bed  MiraLax in the morning when you wake up  Can add Senokot if still having constipation.  Titrate to 1 bowel movement daily      Constipation (Adult)  Constipation means that you have bowel movements that are less frequent than usual. Stools often become very hard and difficult to pass.  Constipation is very common. At some point in life it affects almost everyone. Since everyone's bowel habits are different, what is constipation to one person may not be to another. Your healthcare provider may do tests to diagnose constipation. It depends on what he or she finds when evaluating you.    Symptoms of constipation include:  · Abdominal pain  · Bloating  · Vomiting  · Painful bowel movements  · Itching, swelling, bleeding, or pain around the anus  Causes  Constipation can have many causes. These include:  · Diet low in fiber  · Too much dairy  · Not drinking enough liquids  · Lack of exercise or physical activity. This is especially true for older adults.  · Changes in lifestyle or daily routine, including pregnancy, aging, work, and travel  · Frequent use or misuse of laxatives  · Ignoring the urge to have a bowel movement or delaying it until later  · Medicines, such as certain prescription pain medicines, iron supplements, antacids, certain antidepressants, and calcium supplements  · Diseases like irritable bowel syndrome, bowel obstructions, stroke, diabetes, thyroid disease, Parkinson disease, hemorrhoids, and colon cancer  Complications  Potential complications of constipation can include:  · Hemorrhoids  · Rectal bleeding from hemorrhoids or anal fissures (skin tears)  · Hernias  · Dependency on laxatives  · Chronic constipation  · Fecal impaction  · Bowel obstruction or perforation  Home care  All treatment should be done after talking with your healthcare provider. This is especially true if you have another medical problems, are taking prescription  medicines, or are an older adult. Treatment most often involves lifestyle changes. You may also need medicines. Your healthcare provider will tell you which will work best for you. Follow the advice below to help avoid this problem in the future.  Lifestyle changes  These lifestyle changes can help prevent constipation:  · Diet. Eat a high-fiber diet, with fresh fruit and vegetables, and reduce dairy intake, meats, and processed foods  · Fluids. It's important to get enough fluids each day. Drink plenty of water when you eat more fiber. If you are on diet that limits the amount of fluid you can have, talk about this with your healthcare provider.  · Regular exercise. Check with your healthcare provider first.  Medications  Take any medicines as directed. Some laxatives are safe to use only every now and then. Others can be taken on a regular basis. Talk with your doctor or pharmacist if you have questions.  Prescription pain medicines can cause constipation. If you are taking this kind of medicine, ask your healthcare provider if you should also take a stool softener.  Medicines you may take to treat constipation include:  · Fiber supplements  · Stool softeners  · Laxatives  · Enemas  · Rectal suppositories  Follow-up care  Follow up with your healthcare provider if symptoms don't get better in the next few days. You may need to have more tests or see a specialist.  Call 911  Call 911 if any of these occur:  · Trouble breathing  · Stiff, rigid abdomen that is severely painful to touch  · Confusion  · Fainting or loss of consciousness  · Rapid heart rate  · Chest pain  When to seek medical advice  Call your healthcare provider right away if any of these occur:  · Fever over 100.4°F (38°C)  · Failure to resume normal bowel movements  · Pain in your abdomen or back gets worse  · Nausea or vomiting  · Swelling in your abdomen  · Blood in the stool  · Black, tarry stool  · Involuntary weight loss  · Weakness  Date Last  Reviewed: 12/30/2015  © 3853-5924 [employer]. [address] [place] [address], [place] [address]. All rights reserved. This information is not intended as a substitute for professional medical care. Always follow your healthcare professional's instructions.

## 2020-03-19 DIAGNOSIS — N18.30 CKD (CHRONIC KIDNEY DISEASE), STAGE III: Primary | ICD-10-CM

## 2020-03-19 LAB — HIV 1+2 AB+HIV1 P24 AG SERPL QL IA: NEGATIVE

## 2020-03-20 ENCOUNTER — TELEPHONE (OUTPATIENT)
Dept: FAMILY MEDICINE | Facility: CLINIC | Age: 60
End: 2020-03-20

## 2020-03-20 NOTE — TELEPHONE ENCOUNTER
----- Message from Leonardo Ko sent at 3/20/2020 10:50 AM CDT -----  Type:  Patient Returning Call    Who Called:  Patient  Who Left Message for Patient:  NA  Does the patient know what this is regarding?: Results  Best Call Back Number:  [phone]  Additional Information:

## 2020-04-23 ENCOUNTER — LAB VISIT (OUTPATIENT)
Dept: LAB | Facility: HOSPITAL | Age: 60
End: 2020-04-23
Attending: INTERNAL MEDICINE
Payer: COMMERCIAL

## 2020-04-23 DIAGNOSIS — N18.30 CKD (CHRONIC KIDNEY DISEASE), STAGE III: ICD-10-CM

## 2020-04-23 DIAGNOSIS — Z91.89 RISK FOR CORONARY ARTERY DISEASE GREATER THAN 20% IN NEXT 10 YEARS: ICD-10-CM

## 2020-04-23 LAB
25(OH)D3+25(OH)D2 SERPL-MCNC: 24 NG/ML (ref 30–96)
CHOLEST SERPL-MCNC: 149 MG/DL (ref 120–199)
CHOLEST/HDLC SERPL: 3.6 {RATIO} (ref 2–5)
HDLC SERPL-MCNC: 41 MG/DL (ref 40–75)
HDLC SERPL: 27.5 % (ref 20–50)
LDLC SERPL CALC-MCNC: 98.4 MG/DL (ref 63–159)
NONHDLC SERPL-MCNC: 108 MG/DL
PHOSPHATE SERPL-MCNC: 3 MG/DL (ref 2.7–4.5)
PTH-INTACT SERPL-MCNC: 63 PG/ML (ref 9–77)
TRIGL SERPL-MCNC: 48 MG/DL (ref 30–150)

## 2020-04-23 PROCEDURE — 83970 ASSAY OF PARATHORMONE: CPT

## 2020-04-23 PROCEDURE — 80061 LIPID PANEL: CPT

## 2020-04-23 PROCEDURE — 36415 COLL VENOUS BLD VENIPUNCTURE: CPT | Mod: PO

## 2020-04-23 PROCEDURE — 84100 ASSAY OF PHOSPHORUS: CPT

## 2020-04-23 PROCEDURE — 82306 VITAMIN D 25 HYDROXY: CPT

## 2020-04-24 ENCOUNTER — TELEPHONE (OUTPATIENT)
Dept: FAMILY MEDICINE | Facility: CLINIC | Age: 60
End: 2020-04-24

## 2020-04-24 NOTE — TELEPHONE ENCOUNTER
----- Message from Lewis Teague sent at 4/24/2020  2:30 PM CDT -----  Contact: same  Type:  Test Results    Who Called:  patient  Name of Test (Lab/Mammo/Etc):  labs  Date of Test:  4/23/2020  Ordering Provider:  Daisy  Where the test was performed:  [address]  Best Call Back Number:  [phone]  Additional Information:  n/a

## 2020-06-08 ENCOUNTER — PATIENT OUTREACH (OUTPATIENT)
Dept: ADMINISTRATIVE | Facility: OTHER | Age: 60
End: 2020-06-08

## 2020-06-08 NOTE — PROGRESS NOTES
Care Everywhere: updated  Immunization: updated  Health Maintenance: updated  Media Review: n/a  Legacy Review: n/a  Order placed: n/a  Upcoming appts:n/a

## 2020-06-10 ENCOUNTER — OFFICE VISIT (OUTPATIENT)
Dept: OPHTHALMOLOGY | Facility: CLINIC | Age: 60
End: 2020-06-10
Payer: COMMERCIAL

## 2020-06-10 DIAGNOSIS — H43.11 VITREOUS HEMORRHAGE OF RIGHT EYE: Primary | ICD-10-CM

## 2020-06-10 DIAGNOSIS — H25.13 NS (NUCLEAR SCLEROSIS), BILATERAL: ICD-10-CM

## 2020-06-10 DIAGNOSIS — H35.033 HYPERTENSIVE RETINOPATHY, BILATERAL: ICD-10-CM

## 2020-06-10 DIAGNOSIS — H34.8311 BRANCH RETINAL VEIN OCCLUSION OF RIGHT EYE WITH RETINAL NEOVASCULARIZATION: ICD-10-CM

## 2020-06-10 PROCEDURE — 99999 PR PBB SHADOW E&M-EST. PATIENT-LVL III: CPT | Mod: PBBFAC,,, | Performed by: OPHTHALMOLOGY

## 2020-06-10 PROCEDURE — 99999 PR PBB SHADOW E&M-EST. PATIENT-LVL III: ICD-10-PCS | Mod: PBBFAC,,, | Performed by: OPHTHALMOLOGY

## 2020-06-10 PROCEDURE — 92014 PR EYE EXAM, EST PATIENT,COMPREHESV: ICD-10-PCS | Mod: S$GLB,,, | Performed by: OPHTHALMOLOGY

## 2020-06-10 PROCEDURE — 92014 COMPRE OPH EXAM EST PT 1/>: CPT | Mod: S$GLB,,, | Performed by: OPHTHALMOLOGY

## 2020-06-10 NOTE — PROGRESS NOTES
HPI     Eye Problem      Additional comments: Blurred va with possible blood OD              Comments     DLS: 7/1/19    Pt states on 6/5 pt starting seeing some blood in OD with cloudy va. Has   some irritation and redness OD.           Last edited by Cheryle Quintana on 6/10/2020  9:17 AM. (History)        ROS     Negative for: Constitutional, Gastrointestinal, Neurological, Skin,   Genitourinary, Musculoskeletal, HENT, Endocrine, Cardiovascular, Eyes,   Respiratory, Psychiatric, Allergic/Imm, Heme/Lymph    Last edited by Loi Orellana Jr., MD on 6/10/2020  9:54 AM. (History)        Assessment /Plan     For exam results, see Encounter Report.    Vitreous hemorrhage of right eye  -     Ambulatory referral/consult to Ophthalmology    Hypertensive retinopathy, bilateral    NS (nuclear sclerosis), bilateral    Branch retinal vein occlusion of right eye with retinal neovascularization    No Aspirin or NSAIDS (Advil or Motrin)  Decrease Activity along with keep eye protected  Try to keep head elevated  Referral to retina for eval  Follow up in about 1 week (around 6/17/2020) for f/u vitreous hemorrhage right eye.

## 2020-06-17 ENCOUNTER — PATIENT OUTREACH (OUTPATIENT)
Dept: ADMINISTRATIVE | Facility: OTHER | Age: 60
End: 2020-06-17

## 2020-06-18 ENCOUNTER — OFFICE VISIT (OUTPATIENT)
Dept: FAMILY MEDICINE | Facility: CLINIC | Age: 60
End: 2020-06-18
Payer: COMMERCIAL

## 2020-06-18 ENCOUNTER — OFFICE VISIT (OUTPATIENT)
Dept: OPHTHALMOLOGY | Facility: CLINIC | Age: 60
End: 2020-06-18
Payer: COMMERCIAL

## 2020-06-18 VITALS
OXYGEN SATURATION: 98 % | DIASTOLIC BLOOD PRESSURE: 72 MMHG | BODY MASS INDEX: 23.74 KG/M2 | WEIGHT: 184.94 LBS | SYSTOLIC BLOOD PRESSURE: 114 MMHG | TEMPERATURE: 99 F | HEART RATE: 93 BPM | HEIGHT: 74 IN

## 2020-06-18 VITALS — SYSTOLIC BLOOD PRESSURE: 114 MMHG | DIASTOLIC BLOOD PRESSURE: 72 MMHG | HEART RATE: 89 BPM

## 2020-06-18 DIAGNOSIS — N18.30 CKD (CHRONIC KIDNEY DISEASE), STAGE III: Primary | ICD-10-CM

## 2020-06-18 DIAGNOSIS — H34.8312 STABLE BRANCH RETINAL VEIN OCCLUSION OF RIGHT EYE: ICD-10-CM

## 2020-06-18 DIAGNOSIS — H25.13 NS (NUCLEAR SCLEROSIS), BILATERAL: ICD-10-CM

## 2020-06-18 DIAGNOSIS — K63.5 POLYP OF SIGMOID COLON, UNSPECIFIED TYPE: ICD-10-CM

## 2020-06-18 DIAGNOSIS — H35.033 HYPERTENSIVE RETINOPATHY, BILATERAL: ICD-10-CM

## 2020-06-18 DIAGNOSIS — Z11.59 ENCOUNTER FOR HEPATITIS C SCREENING TEST FOR LOW RISK PATIENT: ICD-10-CM

## 2020-06-18 DIAGNOSIS — Z91.89 RISK FOR CORONARY ARTERY DISEASE GREATER THAN 20% IN NEXT 10 YEARS: ICD-10-CM

## 2020-06-18 DIAGNOSIS — H34.8311 BRANCH RETINAL VEIN OCCLUSION OF RIGHT EYE WITH RETINAL NEOVASCULARIZATION: Primary | ICD-10-CM

## 2020-06-18 PROCEDURE — 3008F BODY MASS INDEX DOCD: CPT | Mod: CPTII,S$GLB,, | Performed by: INTERNAL MEDICINE

## 2020-06-18 PROCEDURE — 3074F PR MOST RECENT SYSTOLIC BLOOD PRESSURE < 130 MM HG: ICD-10-PCS | Mod: CPTII,S$GLB,, | Performed by: INTERNAL MEDICINE

## 2020-06-18 PROCEDURE — 67028 INJECTION EYE DRUG: CPT | Mod: RT,S$GLB,, | Performed by: OPHTHALMOLOGY

## 2020-06-18 PROCEDURE — 3078F PR MOST RECENT DIASTOLIC BLOOD PRESSURE < 80 MM HG: ICD-10-PCS | Mod: CPTII,S$GLB,, | Performed by: INTERNAL MEDICINE

## 2020-06-18 PROCEDURE — 92134 CPTRZ OPH DX IMG PST SGM RTA: CPT | Mod: S$GLB,,, | Performed by: OPHTHALMOLOGY

## 2020-06-18 PROCEDURE — 67028 PR INJECT INTRAVITREAL PHARMCOLOGIC: ICD-10-PCS | Mod: RT,S$GLB,, | Performed by: OPHTHALMOLOGY

## 2020-06-18 PROCEDURE — 99999 PR PBB SHADOW E&M-EST. PATIENT-LVL III: CPT | Mod: PBBFAC,,, | Performed by: INTERNAL MEDICINE

## 2020-06-18 PROCEDURE — 3078F DIAST BP <80 MM HG: CPT | Mod: CPTII,S$GLB,, | Performed by: INTERNAL MEDICINE

## 2020-06-18 PROCEDURE — 3074F SYST BP LT 130 MM HG: CPT | Mod: CPTII,S$GLB,, | Performed by: INTERNAL MEDICINE

## 2020-06-18 PROCEDURE — 3008F PR BODY MASS INDEX (BMI) DOCUMENTED: ICD-10-PCS | Mod: CPTII,S$GLB,, | Performed by: INTERNAL MEDICINE

## 2020-06-18 PROCEDURE — 92134 POSTERIOR SEGMENT OCT RETINA (OCULAR COHERENCE TOMOGRAPHY)-BOTH EYES: ICD-10-PCS | Mod: S$GLB,,, | Performed by: OPHTHALMOLOGY

## 2020-06-18 PROCEDURE — 92014 PR EYE EXAM, EST PATIENT,COMPREHESV: ICD-10-PCS | Mod: 25,S$GLB,, | Performed by: OPHTHALMOLOGY

## 2020-06-18 PROCEDURE — 92235 FLUORESCEIN ANGIOGRAPHY - OU - BOTH EYES: ICD-10-PCS | Mod: S$GLB,,, | Performed by: OPHTHALMOLOGY

## 2020-06-18 PROCEDURE — 99999 PR PBB SHADOW E&M-EST. PATIENT-LVL III: ICD-10-PCS | Mod: PBBFAC,,, | Performed by: INTERNAL MEDICINE

## 2020-06-18 PROCEDURE — 99999 PR PBB SHADOW E&M-EST. PATIENT-LVL III: CPT | Mod: PBBFAC,,, | Performed by: OPHTHALMOLOGY

## 2020-06-18 PROCEDURE — 92014 COMPRE OPH EXAM EST PT 1/>: CPT | Mod: 25,S$GLB,, | Performed by: OPHTHALMOLOGY

## 2020-06-18 PROCEDURE — 99214 OFFICE O/P EST MOD 30 MIN: CPT | Mod: S$GLB,,, | Performed by: INTERNAL MEDICINE

## 2020-06-18 PROCEDURE — 99999 PR PBB SHADOW E&M-EST. PATIENT-LVL III: ICD-10-PCS | Mod: PBBFAC,,, | Performed by: OPHTHALMOLOGY

## 2020-06-18 PROCEDURE — 99214 PR OFFICE/OUTPT VISIT, EST, LEVL IV, 30-39 MIN: ICD-10-PCS | Mod: S$GLB,,, | Performed by: INTERNAL MEDICINE

## 2020-06-18 PROCEDURE — 92235 FLUORESCEIN ANGRPH MLTIFRAME: CPT | Mod: S$GLB,,, | Performed by: OPHTHALMOLOGY

## 2020-06-18 RX ORDER — ATORVASTATIN CALCIUM 10 MG/1
10 TABLET, FILM COATED ORAL DAILY
Qty: 90 TABLET | Refills: 3 | Status: SHIPPED | OUTPATIENT
Start: 2020-06-18 | End: 2021-06-25 | Stop reason: SDUPTHER

## 2020-06-18 RX ORDER — FLUORESCEIN 500 MG/ML
5 INJECTION INTRAVENOUS ONCE
Status: COMPLETED | OUTPATIENT
Start: 2020-06-18 | End: 2020-06-18

## 2020-06-18 RX ADMIN — Medication 1.25 MG: at 09:06

## 2020-06-18 RX ADMIN — FLUORESCEIN 500 MG: 500 INJECTION INTRAVENOUS at 08:06

## 2020-06-18 NOTE — PROGRESS NOTES
Subjective:       Patient ID: Terence Bianchi is a 60 y.o. male.    Chief Complaint: Follow-up      Social Hx:  From [place] and living there now.  2019. Has 4 children. 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     PMH:   Past medical history includes CHAYITO/CKD, hypertension, LUTs, elevated ASCVD risk, tobacco use    HPI:  States constipation is improved on high fiber diet. BP is well controlled. States he avoids NSAIDs. He had C-scope in 2017 that showed multiple polyps. Report said to repeat if adenoma. I could not find the path report. Discussed trying to find or repeating since it will be 3 yrs in august.     A/P:  1.  Hypertension:  Controlled on current medications.  2.  CKD stage 3: recheck bmp in 1 week. Continue to avoid NSAIDs, BP controlled.   3.  Elevated ASCVD risk:  Lipids at goal on atorvastatin  4.  LUTs:  Stable on tamsulosin  5.  Hx of colonic polyps (multiple) will repeat C-scope.   Current Outpatient Medications on File Prior to Visit   Medication Sig Dispense Refill    amLODIPine (NORVASC) 10 MG tablet Take 1 tablet (10 mg total) by mouth once daily. 90 tablet 1    tamsulosin (FLOMAX) 0.4 mg Cap Take 1 capsule (0.4 mg total) by mouth once daily. 90 capsule 1    triamterene-hydrochlorothiazide 37.5-25 mg (DYAZIDE) 37.5-25 mg per capsule Take 1 capsule by mouth once daily. 90 capsule 1    [DISCONTINUED] atorvastatin (LIPITOR) 10 MG tablet Take 1 tablet (10 mg total) by mouth once daily. 30 tablet 1     No current facility-administered medications on file prior to visit.      I personally reviewed past medical, family and social history.  Review of Systems      Objective:     Vitals:    06/18/20 1518   BP: 114/72   Pulse: 93   Temp: 99.4 °F (37.4 °C)        Physical Exam  Constitutional:       General: He is not in acute distress.     Appearance: He is well-developed.   HENT:      Head: Normocephalic and atraumatic.   Eyes:      Pupils: Pupils are equal, round, and reactive to  light.   Neck:      Musculoskeletal: Neck supple.      Thyroid: No thyromegaly.   Cardiovascular:      Rate and Rhythm: Normal rate and regular rhythm.      Heart sounds: Normal heart sounds. No murmur. No friction rub. No gallop.    Pulmonary:      Effort: Pulmonary effort is normal. No respiratory distress.      Breath sounds: Normal breath sounds. No wheezing.   Abdominal:      General: Bowel sounds are normal.      Palpations: Abdomen is soft.      Tenderness: There is no abdominal tenderness.   Skin:     General: Skin is warm.      Findings: No rash.   Neurological:      Mental Status: He is alert and oriented to person, place, and time.      Cranial Nerves: No cranial nerve deficit.   Psychiatric:         Behavior: Behavior normal.           Assessment/Plan   Terence was seen today for follow-up.    Diagnoses and all orders for this visit:    CKD (chronic kidney disease), stage III  -     Basic metabolic panel; Future    Encounter for hepatitis C screening test for low risk patient  -     Hepatitis C Antibody; Future    Polyp of sigmoid colon, unspecified type  -     Case request GI: COLONOSCOPY    Risk for coronary artery disease greater than 20% in next 10 years  -     atorvastatin (LIPITOR) 10 MG tablet; Take 1 tablet (10 mg total) by mouth once daily.

## 2020-06-18 NOTE — PROGRESS NOTES
HPI     Pt sts OD has blood in it and blurry. No flashes           OCT - OD - superior atrophy - no significant CME  OS - No ME    FA - superior NP and NV      A/P    1. BRVO with NV OD  Chronic - no CME  6/20 - recent VH    Avastin OD today    Sector PRP in 2-3 weeks    2. HTN Ret OU  BP control    3. Early NS OU      2-3 weeks for sector PRP    Risks, benefits, and alternatives to treatment discussed in detail with the patient.  The patient voiced understanding and wished to proceed with the procedure    Injection Procedure Note:  Diagnosis: BRVO with NV and VH OD    Patient Identified and Time Out complete  Pt Prefers to be marked with sticker rather than ink marker ([email or web address] [id number])  Topical Proparacaine and Betadine.  Inject Avastin OD at 6:00 @ 3.5-4mm posterior to limbus  Post Operative Dx: Same  Complications: None  Follow up as above.

## 2020-06-18 NOTE — PATIENT INSTRUCTIONS
Fluorescein Angiography     A fluorescein angiogram of the retina   Fluorescein angiography is an eye test. It is done to look at the back of your eye, including:  · The blood vessels in your eye  · The layer of tissue at the back of your eye (the retina)  · The center of your retina (the macula)  · The optic nerve  This test can diagnose diseases found in these areas. It can also diagnose other conditions that affect these areas. To do this test, a dye called fluorescein is shot (injected) into your arm. The dye goes into your bloodstream and up into the blood vessels in your eyes. A special camera is then used to take images (angiograms) of your eyes.  Getting ready for your test  Tell your healthcare provider if you:  · Are pregnant or think you may be pregnant  · Are breastfeeding  · Have a history of severe allergic reactions, including to X-ray dye or other medicines  · Have kidney problems  Tell your provider about any medicines you are taking. You may need to stop taking all or some of these before the test. This includes:  · All prescription medicines  · Over-the-counter medicines such as aspirin or ibuprofen  · Street drugs  · Herbs, vitamins, and other supplements  You should arrange for an adult family member or friend to drive you home after your test. Your vision will be blurry for up to 12 hours.  Follow any other instructions from your healthcare provider.  During your test  · You are given eye drops to enlarge (dilate) your pupils.  · You then sit in front of a special camera. You place your chin on the chin rest and look into the camera.  · Images are taken of your eyes, one eye at a time.  · Fluorescein dye is then injected into your arm. The lights in the room are turned off. You may have mild nausea. You may have a warm feeling in your arm or upper body. Tell your provider if your skin feels itchy or if you are having trouble breathing. If so, you could be having an allergic reaction to the  dye.  · More pictures of your eyes are taken over 15 to 30 minutes. The camera shines a bright light into your eyes. Try to keep your head still and your eyes open.  · When enough images have been taken, the test is over.  After your test  Your vision will be blurry for up to 4 to 12 hours. This is because of your dilated pupils. Your eye will be more sensitive to light for up to 12 hours. You may want to wear sunglasses during this time. Do not drive if your vision is very blurry. You may also find it uncomfortable to read. Your skin may look yellow for a few hours. This is from the dye. Your urine will be bright yellow or orange for 24 to 48 hours after the test.     Risks and possible complications  All procedures have some risks. Possible risks of fluorescein angiography include:  · Upset stomach (nausea) and vomiting  · Leaking dye around the injection site that causes pain and swelling  · Metallic taste in your mouth  · Infection at injection site  · Allergic reaction to the dye  · Dry mouth or too much saliva  · Faster heart rate  · Sweating  · Lower back pain   Date Last Reviewed: 5/30/2015  © 8985-5427 [employer]. [address] [place] [address]. All rights reserved. This information is not intended as a substitute for professional medical care. Always follow your healthcare professional's instructions.      .POST INTRAVITREAL INJECTION PATIENT INSTRUCTIONS   Below are some guidelines for what to expect after your treatment and additional care instructions.   * you may experience mild discomfort in your eye after the treatment. Your eye usually feels better within 24 to 48 hours after the procedure.   * you have been given drops that numb the surface of your eye.   DO NOT rub or touch your eye, DO NOT wear contacts until numbness goes away.   * you may experience small spots that appear in your field of vision, these are usually caused by an air bubble or from the medication. It  taked a few hours or days for these to go away.   * use of sunglasses will help reduce light sensitivity and glare.   * DO NOT swim   for at least 3 hours after the injection   * If you have a gritty, burning, irritating or stinging feeling in the injected eye. This may be a result of the antiseptic used. Use artifical tears or eye lubricant ( from over- the- counter from your local pharmacy ). If you have some at home already please check the expiration date, so not to use anything contaminated in your eye. A cool pack over your eye brow above the injected eye may also relieve discomfort.   Call us right away or go to the Emergency Department if you have a dramatic decrease in vision or extreme pain in the eye.   [facility] [phone]

## 2020-06-27 ENCOUNTER — LAB VISIT (OUTPATIENT)
Dept: LAB | Facility: HOSPITAL | Age: 60
End: 2020-06-27
Attending: INTERNAL MEDICINE
Payer: COMMERCIAL

## 2020-06-27 DIAGNOSIS — N18.30 CKD (CHRONIC KIDNEY DISEASE), STAGE III: ICD-10-CM

## 2020-06-27 DIAGNOSIS — Z11.59 ENCOUNTER FOR HEPATITIS C SCREENING TEST FOR LOW RISK PATIENT: ICD-10-CM

## 2020-06-27 LAB
ANION GAP SERPL CALC-SCNC: 8 MMOL/L (ref 8–16)
BUN SERPL-MCNC: 22 MG/DL (ref 8–23)
CALCIUM SERPL-MCNC: 9.7 MG/DL (ref 8.7–10.5)
CHLORIDE SERPL-SCNC: 102 MMOL/L (ref 95–110)
CO2 SERPL-SCNC: 28 MMOL/L (ref 23–29)
CREAT SERPL-MCNC: 1.4 MG/DL (ref 0.5–1.4)
EST. GFR  (AFRICAN AMERICAN): >60 ML/MIN/1.73 M^2
EST. GFR  (NON AFRICAN AMERICAN): 53.5 ML/MIN/1.73 M^2
GLUCOSE SERPL-MCNC: 87 MG/DL (ref 70–110)
POTASSIUM SERPL-SCNC: 4.6 MMOL/L (ref 3.5–5.1)
SODIUM SERPL-SCNC: 138 MMOL/L (ref 136–145)

## 2020-06-27 PROCEDURE — 36415 COLL VENOUS BLD VENIPUNCTURE: CPT | Mod: PO

## 2020-06-27 PROCEDURE — 80048 BASIC METABOLIC PNL TOTAL CA: CPT

## 2020-06-27 PROCEDURE — 86803 HEPATITIS C AB TEST: CPT

## 2020-06-29 LAB — HCV AB SERPL QL IA: NEGATIVE

## 2020-07-10 ENCOUNTER — PATIENT OUTREACH (OUTPATIENT)
Dept: ADMINISTRATIVE | Facility: OTHER | Age: 60
End: 2020-07-10

## 2020-07-13 ENCOUNTER — OFFICE VISIT (OUTPATIENT)
Dept: OPHTHALMOLOGY | Facility: CLINIC | Age: 60
End: 2020-07-13
Payer: COMMERCIAL

## 2020-07-13 VITALS — HEART RATE: 88 BPM | DIASTOLIC BLOOD PRESSURE: 72 MMHG | SYSTOLIC BLOOD PRESSURE: 114 MMHG

## 2020-07-13 DIAGNOSIS — H34.8311 BRANCH RETINAL VEIN OCCLUSION OF RIGHT EYE WITH RETINAL NEOVASCULARIZATION: Primary | ICD-10-CM

## 2020-07-13 PROCEDURE — 67228 PR TREATMENT EXTENSIVE RETINOPATHY, PHOTOCOAGULATION: ICD-10-PCS | Mod: RT,S$GLB,, | Performed by: OPHTHALMOLOGY

## 2020-07-13 PROCEDURE — 99999 PR PBB SHADOW E&M-EST. PATIENT-LVL III: CPT | Mod: PBBFAC,,, | Performed by: OPHTHALMOLOGY

## 2020-07-13 PROCEDURE — 99999 PR PBB SHADOW E&M-EST. PATIENT-LVL III: ICD-10-PCS | Mod: PBBFAC,,, | Performed by: OPHTHALMOLOGY

## 2020-07-13 PROCEDURE — 99499 NO LOS: ICD-10-PCS | Mod: S$GLB,,, | Performed by: OPHTHALMOLOGY

## 2020-07-13 PROCEDURE — 67228 TREATMENT X10SV RETINOPATHY: CPT | Mod: RT,S$GLB,, | Performed by: OPHTHALMOLOGY

## 2020-07-13 PROCEDURE — 99499 UNLISTED E&M SERVICE: CPT | Mod: S$GLB,,, | Performed by: OPHTHALMOLOGY

## 2020-07-13 NOTE — PROGRESS NOTES
HPI     DLS: 06/18/2020 Dr. Frey    Patient here for Sector PRP      HPI     Pt sts OD has blood in it and blurry. No flashes           OCT - OD - superior atrophy - no significant CME  OS - No ME    FA - superior NP and NV      A/P    1. BRVO with NV OD  Chronic - no CME  6/20 - recent VH  S/p Avastin OD      Sector PRP OD today    2. HTN Ret OU  BP control    3. Early NS OU      3 months OCT    Risks, benefits, and alternatives to treatment discussed in detail with the patient.  The patient voiced understanding and wished to proceed with the procedure    Laser Procedure Note  Dx: BRVO with NV OD  Laser: Sector PRP OD  Argon  Spot: 200  Power: 150-180  Dur: 0.1s  #:   [id number]  Complications: None  F/U as above

## 2020-08-07 ENCOUNTER — TELEPHONE (OUTPATIENT)
Dept: GASTROENTEROLOGY | Facility: CLINIC | Age: 60
End: 2020-08-07

## 2020-08-07 DIAGNOSIS — Z01.812 PRE-PROCEDURE LAB EXAM: ICD-10-CM

## 2020-08-23 ENCOUNTER — LAB VISIT (OUTPATIENT)
Dept: FAMILY MEDICINE | Facility: CLINIC | Age: 60
End: 2020-08-23
Payer: COMMERCIAL

## 2020-08-23 DIAGNOSIS — Z01.812 PRE-PROCEDURE LAB EXAM: ICD-10-CM

## 2020-08-23 PROCEDURE — U0003 INFECTIOUS AGENT DETECTION BY NUCLEIC ACID (DNA OR RNA); SEVERE ACUTE RESPIRATORY SYNDROME CORONAVIRUS 2 (SARS-COV-2) (CORONAVIRUS DISEASE [COVID-19]), AMPLIFIED PROBE TECHNIQUE, MAKING USE OF HIGH THROUGHPUT TECHNOLOGIES AS DESCRIBED BY CMS-2020-01-R: HCPCS

## 2020-08-24 LAB — SARS-COV-2 RNA RESP QL NAA+PROBE: NOT DETECTED

## 2020-08-26 ENCOUNTER — ANESTHESIA (OUTPATIENT)
Dept: ENDOSCOPY | Facility: HOSPITAL | Age: 60
End: 2020-08-26
Payer: COMMERCIAL

## 2020-08-26 ENCOUNTER — ANESTHESIA EVENT (OUTPATIENT)
Dept: ENDOSCOPY | Facility: HOSPITAL | Age: 60
End: 2020-08-26
Payer: COMMERCIAL

## 2020-08-26 ENCOUNTER — HOSPITAL ENCOUNTER (OUTPATIENT)
Facility: HOSPITAL | Age: 60
Discharge: HOME | End: 2020-08-26
Attending: INTERNAL MEDICINE | Admitting: INTERNAL MEDICINE
Payer: COMMERCIAL

## 2020-08-26 DIAGNOSIS — Z86.010 HX OF COLONIC POLYPS: ICD-10-CM

## 2020-08-26 PROBLEM — Z86.0100 HX OF COLONIC POLYPS: Status: ACTIVE | Noted: 2020-08-26

## 2020-08-26 PROCEDURE — D9220A PRA ANESTHESIA: ICD-10-PCS | Mod: 33,CRNA,, | Performed by: NURSE ANESTHETIST, CERTIFIED REGISTERED

## 2020-08-26 PROCEDURE — D9220A PRA ANESTHESIA: Mod: 33,ANES,, | Performed by: ANESTHESIOLOGY

## 2020-08-26 PROCEDURE — 45385 COLONOSCOPY W/LESION REMOVAL: CPT | Mod: 33,,, | Performed by: INTERNAL MEDICINE

## 2020-08-26 PROCEDURE — 37000008 HC ANESTHESIA 1ST 15 MINUTES: Mod: PO | Performed by: INTERNAL MEDICINE

## 2020-08-26 PROCEDURE — 88305 TISSUE EXAM BY PATHOLOGIST: CPT | Mod: 26,,, | Performed by: PATHOLOGY

## 2020-08-26 PROCEDURE — 63600175 PHARM REV CODE 636 W HCPCS: Mod: PO | Performed by: INTERNAL MEDICINE

## 2020-08-26 PROCEDURE — 25000003 PHARM REV CODE 250: Mod: PO | Performed by: NURSE ANESTHETIST, CERTIFIED REGISTERED

## 2020-08-26 PROCEDURE — 88305 TISSUE EXAM BY PATHOLOGIST: ICD-10-PCS | Mod: 26,,, | Performed by: PATHOLOGY

## 2020-08-26 PROCEDURE — 88305 TISSUE EXAM BY PATHOLOGIST: CPT | Performed by: PATHOLOGY

## 2020-08-26 PROCEDURE — D9220A PRA ANESTHESIA: ICD-10-PCS | Mod: 33,ANES,, | Performed by: ANESTHESIOLOGY

## 2020-08-26 PROCEDURE — 37000009 HC ANESTHESIA EA ADD 15 MINS: Mod: PO | Performed by: INTERNAL MEDICINE

## 2020-08-26 PROCEDURE — 63600175 PHARM REV CODE 636 W HCPCS: Mod: PO | Performed by: NURSE ANESTHETIST, CERTIFIED REGISTERED

## 2020-08-26 PROCEDURE — 45385 PR COLONOSCOPY,REMV LESN,SNARE: ICD-10-PCS | Mod: 33,,, | Performed by: INTERNAL MEDICINE

## 2020-08-26 PROCEDURE — 27201089 HC SNARE, DISP (ANY): Mod: PO | Performed by: INTERNAL MEDICINE

## 2020-08-26 PROCEDURE — 45385 COLONOSCOPY W/LESION REMOVAL: CPT | Mod: PO | Performed by: INTERNAL MEDICINE

## 2020-08-26 PROCEDURE — D9220A PRA ANESTHESIA: Mod: 33,CRNA,, | Performed by: NURSE ANESTHETIST, CERTIFIED REGISTERED

## 2020-08-26 RX ORDER — PROPOFOL 10 MG/ML
VIAL (ML) INTRAVENOUS
Status: DISCONTINUED | OUTPATIENT
Start: 2020-08-26 | End: 2020-08-26

## 2020-08-26 RX ORDER — SODIUM CHLORIDE 0.9 % (FLUSH) 0.9 %
10 SYRINGE (ML) INJECTION
Status: DISCONTINUED | OUTPATIENT
Start: 2020-08-26 | End: 2020-08-26 | Stop reason: HOSPADM

## 2020-08-26 RX ORDER — LIDOCAINE HCL/PF 100 MG/5ML
SYRINGE (ML) INTRAVENOUS
Status: DISCONTINUED | OUTPATIENT
Start: 2020-08-26 | End: 2020-08-26

## 2020-08-26 RX ORDER — SODIUM CHLORIDE, SODIUM LACTATE, POTASSIUM CHLORIDE, CALCIUM CHLORIDE 600; 310; 30; 20 MG/100ML; MG/100ML; MG/100ML; MG/100ML
INJECTION, SOLUTION INTRAVENOUS CONTINUOUS
Status: DISCONTINUED | OUTPATIENT
Start: 2020-08-26 | End: 2020-08-26 | Stop reason: HOSPADM

## 2020-08-26 RX ADMIN — PROPOFOL 50 MG: 10 INJECTION, EMULSION INTRAVENOUS at 11:08

## 2020-08-26 RX ADMIN — PROPOFOL 100 MG: 10 INJECTION, EMULSION INTRAVENOUS at 11:08

## 2020-08-26 RX ADMIN — LIDOCAINE HYDROCHLORIDE 100 MG: 20 INJECTION, SOLUTION INTRAVENOUS at 11:08

## 2020-08-26 RX ADMIN — SODIUM CHLORIDE, SODIUM LACTATE, POTASSIUM CHLORIDE, AND CALCIUM CHLORIDE: .6; .31; .03; .02 INJECTION, SOLUTION INTRAVENOUS at 11:08

## 2020-08-26 NOTE — TRANSFER OF CARE
"Anesthesia Transfer of Care Note    Patient: Terence Bianchi    Procedure(s) Performed: Procedure(s) (LRB):  COLONOSCOPY (N/A)    Patient location: PACU    Anesthesia Type: general    Transport from OR: Transported from OR on room air with adequate spontaneous ventilation    Post pain: adequate analgesia    Post assessment: no apparent anesthetic complications and tolerated procedure well    Post vital signs: stable    Level of consciousness: sedated and responds to stimulation    Nausea/Vomiting: no nausea/vomiting    Complications: none    Transfer of care protocol was followed      Last vitals:   Visit Vitals  BP (!) 142/82 (BP Location: Right arm, Patient Position: Lying)   Pulse 78   Temp 36.6 °C (97.9 °F) (Skin)   Resp 16   Ht 6' 1" (1.854 m)   Wt 83.5 kg (184 lb)   SpO2 98%   BMI 24.28 kg/m²     "

## 2020-08-26 NOTE — ANESTHESIA POSTPROCEDURE EVALUATION
Anesthesia Post Evaluation    Patient: Terence Bianchi    Procedure(s) Performed: Procedure(s) (LRB):  COLONOSCOPY (N/A)    Final Anesthesia Type: general    Patient location during evaluation: PACU  Patient participation: Yes- Able to Participate  Level of consciousness: sedated and awake  Post-procedure vital signs: reviewed and stable  Pain management: adequate  Airway patency: patent    PONV status at discharge: No PONV  Anesthetic complications: no      Cardiovascular status: blood pressure returned to baseline  Respiratory status: spontaneous ventilation  Hydration status: euvolemic  Follow-up not needed.          Vitals Value Taken Time   BP 83/52 08/26/20 1200   Temp 36.7 °C (98 °F) 08/26/20 1157   Pulse 56 08/26/20 1200   Resp 18 08/26/20 1200   SpO2 96 % 08/26/20 1200         No case tracking events are documented in the log.      Pain/Herbert Score: No data recorded

## 2020-08-26 NOTE — ANESTHESIA PREPROCEDURE EVALUATION
08/26/2020  Terence Bianchi is a 60 y.o., male.    Anesthesia Evaluation    I have reviewed the Patient Summary Reports.    I have reviewed the Nursing Notes. I have reviewed the NPO Status.   I have reviewed the Medications.     Review of Systems  EENT/Dental:EENT/Dental Normal   Cardiovascular:   Exercise tolerance: good Hypertension    Pulmonary:  Pulmonary Normal    Renal/:  Renal/ Normal     Hepatic/GI:  Hepatic/GI Normal    Musculoskeletal:  Musculoskeletal Normal    Neurological:  Neurology Normal    Endocrine:  Endocrine Normal    Psych:  Psychiatric Normal           Physical Exam  General:  Well nourished    Airway/Jaw/Neck:  Airway Findings: Mouth Opening: Normal Tongue: Normal  General Airway Assessment: Adult  Mallampati: III  Improves to II with phonation.      Dental:  Dental Findings: In tact   Chest/Lungs:  Chest/Lungs Findings: Clear to auscultation, Normal Respiratory Rate         Mental Status:  Mental Status Findings:  Cooperative, Alert and Oriented         Anesthesia Plan  Type of Anesthesia, risks & benefits discussed:  Anesthesia Type:  general  Patient's Preference:   Intra-op Monitoring Plan: standard ASA monitors  Intra-op Monitoring Plan Comments:   Post Op Pain Control Plan:   Post Op Pain Control Plan Comments:   Induction:   IV  Beta Blocker:  Patient is not currently on a Beta-Blocker (No further documentation required).       Informed Consent: Patient understands risks and agrees with Anesthesia plan.  Questions answered. Anesthesia consent signed with patient.  ASA Score: 2     Day of Surgery Review of History & Physical:            Ready For Surgery From Anesthesia Perspective.

## 2020-08-26 NOTE — H&P
History & Physical - Short Stay  Gastroenterology      SUBJECTIVE:     Procedure: Colonoscopy    Chief Complaint/Indication for Procedure: Previous Polyps    PTA Medications   Medication Sig    amLODIPine (NORVASC) 10 MG tablet Take 1 tablet (10 mg total) by mouth once daily.    atorvastatin (LIPITOR) 10 MG tablet Take 1 tablet (10 mg total) by mouth once daily.    tamsulosin (FLOMAX) 0.4 mg Cap Take 1 capsule (0.4 mg total) by mouth once daily.    triamterene-hydrochlorothiazide 37.5-25 mg (DYAZIDE) 37.5-25 mg per capsule Take 1 capsule by mouth once daily.       Review of patient's allergies indicates:   Allergen Reactions    Lisinopril Swelling     Lips swelled        Past Medical History:   Diagnosis Date    BRVO (branch retinal vein occlusion)     OD    Cataract     High cholesterol     Hypertension     Hypertensive retinopathy of both eyes      Past Surgical History:   Procedure Laterality Date    EYE SURGERY Right 2020     Family History   Problem Relation Age of Onset    Hypertension Mother     Stroke Mother     Cancer Father     Cancer Maternal Grandmother     Amblyopia Neg Hx     Blindness Neg Hx     Cataracts Neg Hx     Diabetes Neg Hx     Glaucoma Neg Hx     Macular degeneration Neg Hx     Retinal detachment Neg Hx     Strabismus Neg Hx     Thyroid disease Neg Hx      Social History     Tobacco Use    Smoking status: Current Every Day Smoker    Smokeless tobacco: Never Used   Substance Use Topics    Alcohol use: Never     Frequency: Never    Drug use: Not Currently         OBJECTIVE:     Vital Signs (Most Recent)  Temp: 97.9 °F (36.6 °C) (08/26/20 1101)  Pulse: 78 (08/26/20 1101)  Resp: 16 (08/26/20 1101)  BP: (!) 142/82 (08/26/20 1101)  SpO2: 98 % (08/26/20 1101)    Physical Exam:                                                       GENERAL:  Comfortable, in no acute distress.                                 HEENT EXAM:  Nonicteric.  No adenopathy.  Oropharynx is clear.                NECK:  Supple.                                                               LUNGS:  Clear.                                                               CARDIAC:  Regular rate and rhythm.  S1, S2.  No murmur.                      ABDOMEN:  Soft, positive bowel sounds, nontender.  No hepatosplenomegaly or masses.  No rebound or guarding.                                             EXTREMITIES:  No edema.     MENTAL STATUS:  Normal, alert and oriented.      ASSESSMENT/PLAN:     Assessment: Previous Polyps    Plan: Colonoscopy    Anesthesia Plan: General    ASA Grade: ASA 2 - Patient with mild systemic disease with no functional limitations    MALLAMPATI SCORE:  I (soft palate, uvula, fauces, and tonsillar pillars visible)     In my medical opinion and judgment, this medical or surgical procedure was not able to be safely postponed in accordance with [place] [facility], Healthcare Facility Notice #[id number].

## 2020-08-26 NOTE — [employer] PATIENT INSTRUCTIONS
Discharge Summary/Instructions after an Endoscopic Procedure  Patient Name: Terence Bianchi  Patient MRN: [id number]  Patient YOB: 1960 Wednesday, August 26, 2020  iMtesh Peters MD  RESTRICTIONS:  During your procedure today, you received medications for sedation.  These   medications may affect your judgment, balance and coordination.  Therefore,   for 24 hours, you have the following restrictions:   - DO NOT drive a car, operate machinery, make legal/financial decisions,   sign important papers or drink alcohol.    ACTIVITY:  Today: no heavy lifting, straining or running due to procedural   sedation/anesthesia.  The following day: return to full activity including work.  DIET:  Eat and drink normally unless instructed otherwise.     TREATMENT FOR COMMON SIDE EFFECTS:  - Mild abdominal pain, nausea, belching, bloating or excessive gas:  rest,   eat lightly and use a heating pad.  - Sore Throat: treat with throat lozenges and/or gargle with warm salt   water.  - Because air was used during the procedure, expelling large amounts of air   from your rectum or belching is normal.  - If a bowel prep was taken, you may not have a bowel movement for 1-3 days.    This is normal.  SYMPTOMS TO WATCH FOR AND REPORT TO YOUR PHYSICIAN:  1. Abdominal pain or bloating, other than gas cramps.  2. Chest pain.  3. Back pain.  4. Signs of infection such as: chills or fever occurring within 24 hours   after the procedure.  5. Rectal bleeding, which would show as bright red, maroon, or black stools.   (A tablespoon of blood from the rectum is not serious, especially if   hemorrhoids are present.)  6. Vomiting.  7. Weakness or dizziness.  GO DIRECTLY TO THE NEAREST EMERGENCY ROOM IF YOU HAVE ANY OF THE FOLLOWING:      Difficulty breathing              Chills and/or fever over 101 F   Persistent vomiting and/or vomiting blood   Severe abdominal pain   Severe chest pain   Black, tarry stools   Bleeding- more than one  tablespoon   Any other symptom or condition that you feel may need urgent attention  Your doctor recommends these additional instructions:  If any biopsies were taken, your doctors clinic will contact you in 1 to 2   weeks with any results.  We are waiting for your pathology results.   Your physician has recommended a repeat colonoscopy in five years for   surveillance based on pathology results.   You are being discharged to home.  For questions, problems or results please call your physician - Mitesh Peters MD at Work:  [phone].  EMERGENCY PHONE NUMBER: [phone], LAB RESULTS: [phone]  IF A COMPLICATION OR EMERGENCY SITUATION ARISES AND YOU ARE UNABLE TO REACH   YOUR PHYSICIAN - GO DIRECTLY TO THE EMERGENCY ROOM.  ___________________________________________  Nurse Signature  ___________________________________________  Patient/Designated Responsible Party Signature  Mitesh Peters MD  8/26/2020 11:56:06 AM  This report has been verified and signed electronically.  [employer]

## 2020-08-26 NOTE — PLAN OF CARE
discharge instructions reviewed with pt. Understanding verbalized. No complaints of pain reported. Pt able to tolerate po intake. To be transported to car by RN.

## 2020-08-26 NOTE — DISCHARGE SUMMARY
Discharge Note  Short Stay      SUMMARY     Admit Date: 8/26/2020    Attending Physician: Mitesh Peters MD     Discharge Physician: Mitesh Peters MD    Discharge Date: 8/26/2020 11:56 AM    Final Diagnosis: Polyp of sigmoid colon, unspecified type [D12.5]    Disposition: HOME OR SELF CARE    Patient Instructions:   Current Discharge Medication List      CONTINUE these medications which have NOT CHANGED    Details   amLODIPine (NORVASC) 10 MG tablet Take 1 tablet (10 mg total) by mouth once daily.  Qty: 90 tablet, Refills: 1    Associated Diagnoses: Essential hypertension      atorvastatin (LIPITOR) 10 MG tablet Take 1 tablet (10 mg total) by mouth once daily.  Qty: 90 tablet, Refills: 3    Associated Diagnoses: Risk for coronary artery disease greater than 20% in next 10 years      tamsulosin (FLOMAX) 0.4 mg Cap Take 1 capsule (0.4 mg total) by mouth once daily.  Qty: 90 capsule, Refills: 1    Associated Diagnoses: Lower urinary tract symptoms (LUTS)      triamterene-hydrochlorothiazide 37.5-25 mg (DYAZIDE) 37.5-25 mg per capsule Take 1 capsule by mouth once daily.  Qty: 90 capsule, Refills: 1    Associated Diagnoses: Essential hypertension             Discharge Procedure Orders (must include Diet, Follow-up, Activity)    Follow Up:  Follow up with PCP as previously scheduled  Resume routine diet.  Activity as tolerated.    No driving day of procedure.

## 2020-08-27 VITALS
HEIGHT: 73 IN | OXYGEN SATURATION: 99 % | TEMPERATURE: 98 F | HEART RATE: 65 BPM | WEIGHT: 184 LBS | DIASTOLIC BLOOD PRESSURE: 80 MMHG | SYSTOLIC BLOOD PRESSURE: 117 MMHG | RESPIRATION RATE: 18 BRPM | BODY MASS INDEX: 24.39 KG/M2

## 2020-09-01 LAB
FINAL PATHOLOGIC DIAGNOSIS: NORMAL
GROSS: NORMAL

## 2020-09-18 ENCOUNTER — OFFICE VISIT (OUTPATIENT)
Dept: FAMILY MEDICINE | Facility: CLINIC | Age: 60
End: 2020-09-18
Payer: COMMERCIAL

## 2020-09-18 VITALS
WEIGHT: 181 LBS | TEMPERATURE: 99 F | HEIGHT: 73 IN | DIASTOLIC BLOOD PRESSURE: 68 MMHG | BODY MASS INDEX: 23.99 KG/M2 | HEART RATE: 78 BPM | SYSTOLIC BLOOD PRESSURE: 114 MMHG | OXYGEN SATURATION: 95 %

## 2020-09-18 DIAGNOSIS — Z12.2 ENCOUNTER FOR SCREENING FOR LUNG CANCER: ICD-10-CM

## 2020-09-18 DIAGNOSIS — Z12.2 ENCOUNTER FOR SCREENING FOR MALIGNANT NEOPLASM OF RESPIRATORY ORGANS: ICD-10-CM

## 2020-09-18 DIAGNOSIS — Z91.89 RISK FOR CORONARY ARTERY DISEASE GREATER THAN 20% IN NEXT 10 YEARS: Primary | ICD-10-CM

## 2020-09-18 DIAGNOSIS — R39.9 LOWER URINARY TRACT SYMPTOMS (LUTS): ICD-10-CM

## 2020-09-18 DIAGNOSIS — Z72.0 TOBACCO USE: ICD-10-CM

## 2020-09-18 DIAGNOSIS — I10 ESSENTIAL HYPERTENSION: ICD-10-CM

## 2020-09-18 PROCEDURE — 3078F PR MOST RECENT DIASTOLIC BLOOD PRESSURE < 80 MM HG: ICD-10-PCS | Mod: CPTII,S$GLB,, | Performed by: INTERNAL MEDICINE

## 2020-09-18 PROCEDURE — 3074F SYST BP LT 130 MM HG: CPT | Mod: CPTII,S$GLB,, | Performed by: INTERNAL MEDICINE

## 2020-09-18 PROCEDURE — 99999 PR PBB SHADOW E&M-EST. PATIENT-LVL III: CPT | Mod: PBBFAC,,, | Performed by: INTERNAL MEDICINE

## 2020-09-18 PROCEDURE — 99999 PR PBB SHADOW E&M-EST. PATIENT-LVL III: ICD-10-PCS | Mod: PBBFAC,,, | Performed by: INTERNAL MEDICINE

## 2020-09-18 PROCEDURE — 3074F PR MOST RECENT SYSTOLIC BLOOD PRESSURE < 130 MM HG: ICD-10-PCS | Mod: CPTII,S$GLB,, | Performed by: INTERNAL MEDICINE

## 2020-09-18 PROCEDURE — 3008F PR BODY MASS INDEX (BMI) DOCUMENTED: ICD-10-PCS | Mod: CPTII,S$GLB,, | Performed by: INTERNAL MEDICINE

## 2020-09-18 PROCEDURE — 99214 OFFICE O/P EST MOD 30 MIN: CPT | Mod: S$GLB,,, | Performed by: INTERNAL MEDICINE

## 2020-09-18 PROCEDURE — 3008F BODY MASS INDEX DOCD: CPT | Mod: CPTII,S$GLB,, | Performed by: INTERNAL MEDICINE

## 2020-09-18 PROCEDURE — 99214 PR OFFICE/OUTPT VISIT, EST, LEVL IV, 30-39 MIN: ICD-10-PCS | Mod: S$GLB,,, | Performed by: INTERNAL MEDICINE

## 2020-09-18 PROCEDURE — 3078F DIAST BP <80 MM HG: CPT | Mod: CPTII,S$GLB,, | Performed by: INTERNAL MEDICINE

## 2020-09-18 RX ORDER — TAMSULOSIN HYDROCHLORIDE 0.4 MG/1
0.4 CAPSULE ORAL DAILY
Qty: 90 CAPSULE | Refills: 3 | Status: SHIPPED | OUTPATIENT
Start: 2020-09-18 | End: 2024-04-02

## 2020-09-18 RX ORDER — INFLUENZA A VIRUS A/MICHIGAN/45/2015 X-275 (H1N1) ANTIGEN (FORMALDEHYDE INACTIVATED), INFLUENZA A VIRUS A/SINGAPORE/INFIMH-16-0019/2016 IVR-186 (H3N2) ANTIGEN (FORMALDEHYDE INACTIVATED), INFLUENZA B VIRUS B/PHUKET/3073/2013 ANTIGEN (FORMALDEHYDE INACTIVATED), AND INFLUENZA B VIRUS B/MARYLAND/15/2016 BX-69A ANTIGEN (FORMALDEHYDE INACTIVATED) 60; 60; 60; 60 UG/.7ML; UG/.7ML; UG/.7ML; UG/.7ML
INJECTION, SUSPENSION INTRAMUSCULAR
COMMUNITY
Start: 2020-09-12 | End: 2024-04-02 | Stop reason: ALTCHOICE

## 2020-09-18 RX ORDER — TRIAMTERENE AND HYDROCHLOROTHIAZIDE 37.5; 25 MG/1; MG/1
1 CAPSULE ORAL DAILY
Qty: 90 CAPSULE | Refills: 3 | Status: ON HOLD | OUTPATIENT
Start: 2020-09-18 | End: 2024-03-23 | Stop reason: HOSPADM

## 2020-09-18 RX ORDER — AMLODIPINE BESYLATE 10 MG/1
10 TABLET ORAL DAILY
Qty: 90 TABLET | Refills: 3 | Status: SHIPPED | OUTPATIENT
Start: 2020-09-18

## 2020-09-18 NOTE — PROGRESS NOTES
Subjective:       Patient ID: Terence Bianchi is a 60 y.o. male.    Chief Complaint: Follow-up      Social Hx:  From [place] and living there now.  2019. Has 4 children. 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     PMH:   1.  Hypertension:  Amlodipine and triamterene-hydrochlorothiazide  2.  CKD stage 3:  3.  Elevated ASCVD risk:  Atorvastatin  4.  LUTS:  Tamsulosin  5.  History of colonic polyps: C-scope Aug 2020, 1 hyperplastic, GI wanting repeat in 5 yrs.   6.  Tobacco use:     HPI:   Here for 3 mo f/u. Doing well. Constipation is mostly resolved. States he is reg now. Smokes 1/2 to 1 ppd.     A/P:   Hypertension controlled  CKD stable  Discussed smoking cessation, offered chantix, will check low dose CT   Lipids controlled February 2020    Health Maintenance:   Shingles vaccine      Current Outpatient Medications on File Prior to Visit   Medication Sig Dispense Refill    atorvastatin (LIPITOR) 10 MG tablet Take 1 tablet (10 mg total) by mouth once daily. 90 tablet 3    FLUZONE HIGHDOSE QUAD 20-21  mcg/0.7 mL Syrg       [DISCONTINUED] amLODIPine (NORVASC) 10 MG tablet Take 1 tablet (10 mg total) by mouth once daily. 90 tablet 1    [DISCONTINUED] tamsulosin (FLOMAX) 0.4 mg Cap Take 1 capsule (0.4 mg total) by mouth once daily. 90 capsule 1    [DISCONTINUED] triamterene-hydrochlorothiazide 37.5-25 mg (DYAZIDE) 37.5-25 mg per capsule Take 1 capsule by mouth once daily. 90 capsule 1     No current facility-administered medications on file prior to visit.      I personally reviewed past medical, family and social history.  Review of Systems   Constitutional: Negative for activity change and fever.   HENT: Negative for sore throat and trouble swallowing.    Eyes: Negative for pain and visual disturbance.   Respiratory: Negative for cough, shortness of breath and wheezing.    Cardiovascular: Negative for chest pain, palpitations and leg swelling.   Gastrointestinal:  Negative for abdominal pain, blood in stool, diarrhea, nausea and vomiting.   Endocrine: Negative for cold intolerance and polyuria.   Genitourinary: Negative for decreased urine volume and dysuria.   Musculoskeletal: Negative for gait problem and neck pain.   Skin: Negative for rash.   Neurological: Negative for dizziness, syncope and light-headedness.   Psychiatric/Behavioral: Negative for dysphoric mood. The patient is not nervous/anxious.          Objective:     Vitals:    09/18/20 1544   BP: 114/68   Pulse: 78   Temp: 98.8 °F (37.1 °C)        Physical Exam  Constitutional:       General: He is not in acute distress.     Appearance: He is well-developed.   HENT:      Head: Normocephalic and atraumatic.   Eyes:      Pupils: Pupils are equal, round, and reactive to light.   Neck:      Musculoskeletal: Neck supple.      Thyroid: No thyromegaly.   Cardiovascular:      Rate and Rhythm: Normal rate and regular rhythm.      Heart sounds: Normal heart sounds. No murmur. No friction rub. No gallop.    Pulmonary:      Effort: Pulmonary effort is normal. No respiratory distress.      Breath sounds: Normal breath sounds. No wheezing.   Abdominal:      General: Bowel sounds are normal.      Palpations: Abdomen is soft.      Tenderness: There is no abdominal tenderness.   Skin:     General: Skin is warm.      Findings: No rash.   Neurological:      Mental Status: He is alert and oriented to person, place, and time.      Cranial Nerves: No cranial nerve deficit.   Psychiatric:         Behavior: Behavior normal.           Assessment/Plan   Terence was seen today for follow-up.    Diagnoses and all orders for this visit:    Risk for coronary artery disease greater than 20% in next 10 years  -     Lipid Panel; Future    Essential hypertension  -     amLODIPine (NORVASC) 10 MG tablet; Take 1 tablet (10 mg total) by mouth once daily.  -     triamterene-hydrochlorothiazide 37.5-25 mg (DYAZIDE) 37.5-25 mg per capsule; Take 1 capsule by  mouth once daily.  -     Comprehensive metabolic panel; Future  -     CBC auto differential; Future    Lower urinary tract symptoms (LUTS)  -     tamsulosin (FLOMAX) 0.4 mg Cap; Take 1 capsule (0.4 mg total) by mouth once daily.

## 2020-09-22 ENCOUNTER — HOSPITAL ENCOUNTER (OUTPATIENT)
Dept: RADIOLOGY | Facility: HOSPITAL | Age: 60
Discharge: HOME | End: 2020-09-22
Attending: INTERNAL MEDICINE
Payer: COMMERCIAL

## 2020-09-22 DIAGNOSIS — Z12.2 ENCOUNTER FOR SCREENING FOR MALIGNANT NEOPLASM OF RESPIRATORY ORGANS: ICD-10-CM

## 2020-09-22 PROCEDURE — G0297 LDCT FOR LUNG CA SCREEN: HCPCS | Mod: TC,PO

## 2020-09-22 PROCEDURE — G0297 LDCT FOR LUNG CA SCREEN: HCPCS | Mod: 26,,, | Performed by: RADIOLOGY

## 2020-09-22 PROCEDURE — G0297 CT CHEST LUNG SCREENING LOW DOSE: ICD-10-PCS | Mod: 26,,, | Performed by: RADIOLOGY

## 2020-10-17 ENCOUNTER — PATIENT OUTREACH (OUTPATIENT)
Dept: ADMINISTRATIVE | Facility: OTHER | Age: 60
End: 2020-10-17

## 2020-10-17 NOTE — PROGRESS NOTES
LINKS immunization registry updated  Care Everywhere updated  Health Maintenance updated  Chart reviewed for overdue Proactive [facility] Encounters ([facility]CE[facility]O) health maintenance testing (CRS, Breast Ca, Diabetic Eye Exam)   Orders entered:N/A

## 2020-10-19 ENCOUNTER — OFFICE VISIT (OUTPATIENT)
Dept: OPHTHALMOLOGY | Facility: CLINIC | Age: 60
End: 2020-10-19
Payer: COMMERCIAL

## 2020-10-19 DIAGNOSIS — H35.033 HYPERTENSIVE RETINOPATHY, BILATERAL: ICD-10-CM

## 2020-10-19 DIAGNOSIS — H34.8311 BRANCH RETINAL VEIN OCCLUSION OF RIGHT EYE WITH RETINAL NEOVASCULARIZATION: Primary | ICD-10-CM

## 2020-10-19 DIAGNOSIS — H25.13 NS (NUCLEAR SCLEROSIS), BILATERAL: ICD-10-CM

## 2020-10-19 PROCEDURE — 92134 CPTRZ OPH DX IMG PST SGM RTA: CPT | Mod: S$GLB,,, | Performed by: OPHTHALMOLOGY

## 2020-10-19 PROCEDURE — 92202 PR OPHTHALMOSCOPY, EXT, W/DRAW OPTIC NERVE/MACULA, I&R, UNI/BI: ICD-10-PCS | Mod: S$GLB,,, | Performed by: OPHTHALMOLOGY

## 2020-10-19 PROCEDURE — 99999 PR PBB SHADOW E&M-EST. PATIENT-LVL III: ICD-10-PCS | Mod: PBBFAC,,, | Performed by: OPHTHALMOLOGY

## 2020-10-19 PROCEDURE — 92202 OPSCPY EXTND ON/MAC DRAW: CPT | Mod: S$GLB,,, | Performed by: OPHTHALMOLOGY

## 2020-10-19 PROCEDURE — 92134 POSTERIOR SEGMENT OCT RETINA (OCULAR COHERENCE TOMOGRAPHY)-BOTH EYES: ICD-10-PCS | Mod: S$GLB,,, | Performed by: OPHTHALMOLOGY

## 2020-10-19 PROCEDURE — 99999 PR PBB SHADOW E&M-EST. PATIENT-LVL III: CPT | Mod: PBBFAC,,, | Performed by: OPHTHALMOLOGY

## 2020-10-19 PROCEDURE — 92014 COMPRE OPH EXAM EST PT 1/>: CPT | Mod: S$GLB,,, | Performed by: OPHTHALMOLOGY

## 2020-10-19 PROCEDURE — 92014 PR EYE EXAM, EST PATIENT,COMPREHESV: ICD-10-PCS | Mod: S$GLB,,, | Performed by: OPHTHALMOLOGY

## 2020-10-19 NOTE — PROGRESS NOTES
"HPI     Eye Problem      Additional comments: BRVO              Comments     DLS: 07/13/2020 Dr. Frey    Patient states he can see a "blood line" OD that comes and goes. Denies   flashes or floaters.          OCT - OD - no ME  OS - no ME    Prior FA - superior NP and NV OD  No leakage OS      A/P    1. BRVO with NV OD  Chronic - no CME  6/20 - recent VH  S/p Avastin OD  S/p sector PRP OD 7/20    NV regressing    Obs today    2. HTN Ret OU  BP control    3. Early NS OU      6 months OCT    "

## 2021-01-29 ENCOUNTER — CLINICAL SUPPORT (OUTPATIENT)
Dept: URGENT CARE | Facility: CLINIC | Age: 61
End: 2021-01-29

## 2021-01-29 DIAGNOSIS — Z02.89 ENCOUNTER FOR EXAMINATION REQUIRED BY DEPARTMENT OF TRANSPORTATION (DOT): Primary | ICD-10-CM

## 2021-01-29 PROCEDURE — 99499 UNLISTED E&M SERVICE: CPT | Mod: S$GLB,,, | Performed by: FAMILY MEDICINE

## 2021-01-29 PROCEDURE — 99499 PHYSICAL, RECERT DOT/CDL: ICD-10-PCS | Mod: S$GLB,,, | Performed by: FAMILY MEDICINE

## 2021-03-23 ENCOUNTER — LAB VISIT (OUTPATIENT)
Dept: LAB | Facility: HOSPITAL | Age: 61
End: 2021-03-23
Attending: INTERNAL MEDICINE
Payer: COMMERCIAL

## 2021-03-23 ENCOUNTER — OFFICE VISIT (OUTPATIENT)
Dept: FAMILY MEDICINE | Facility: CLINIC | Age: 61
End: 2021-03-23
Payer: COMMERCIAL

## 2021-03-23 VITALS
DIASTOLIC BLOOD PRESSURE: 84 MMHG | HEART RATE: 94 BPM | WEIGHT: 191.13 LBS | OXYGEN SATURATION: 97 % | BODY MASS INDEX: 25.22 KG/M2 | SYSTOLIC BLOOD PRESSURE: 102 MMHG

## 2021-03-23 DIAGNOSIS — Z12.5 SCREENING FOR PROSTATE CANCER: ICD-10-CM

## 2021-03-23 DIAGNOSIS — Z91.89 RISK FOR CORONARY ARTERY DISEASE GREATER THAN 20% IN NEXT 10 YEARS: ICD-10-CM

## 2021-03-23 DIAGNOSIS — I10 ESSENTIAL HYPERTENSION: ICD-10-CM

## 2021-03-23 DIAGNOSIS — K40.90 RIGHT INGUINAL HERNIA: Primary | ICD-10-CM

## 2021-03-23 LAB
ALBUMIN SERPL BCP-MCNC: 3.5 G/DL (ref 3.5–5.2)
ALP SERPL-CCNC: 70 U/L (ref 55–135)
ALT SERPL W/O P-5'-P-CCNC: 11 U/L (ref 10–44)
ANION GAP SERPL CALC-SCNC: 9 MMOL/L (ref 8–16)
AST SERPL-CCNC: 12 U/L (ref 10–40)
BILIRUB SERPL-MCNC: 0.2 MG/DL (ref 0.1–1)
BUN SERPL-MCNC: 19 MG/DL (ref 8–23)
CALCIUM SERPL-MCNC: 9.2 MG/DL (ref 8.7–10.5)
CHLORIDE SERPL-SCNC: 101 MMOL/L (ref 95–110)
CHOLEST SERPL-MCNC: 150 MG/DL (ref 120–199)
CHOLEST/HDLC SERPL: 3.8 {RATIO} (ref 2–5)
CO2 SERPL-SCNC: 28 MMOL/L (ref 23–29)
COMPLEXED PSA SERPL-MCNC: 1.2 NG/ML (ref 0–4)
CREAT SERPL-MCNC: 1.4 MG/DL (ref 0.5–1.4)
EST. GFR  (AFRICAN AMERICAN): >60 ML/MIN/1.73 M^2
EST. GFR  (NON AFRICAN AMERICAN): 53.5 ML/MIN/1.73 M^2
GLUCOSE SERPL-MCNC: 121 MG/DL (ref 70–110)
HDLC SERPL-MCNC: 39 MG/DL (ref 40–75)
HDLC SERPL: 26 % (ref 20–50)
LDLC SERPL CALC-MCNC: 89 MG/DL (ref 63–159)
NONHDLC SERPL-MCNC: 111 MG/DL
POTASSIUM SERPL-SCNC: 4.2 MMOL/L (ref 3.5–5.1)
PROT SERPL-MCNC: 7.7 G/DL (ref 6–8.4)
SODIUM SERPL-SCNC: 138 MMOL/L (ref 136–145)
TRIGL SERPL-MCNC: 110 MG/DL (ref 30–150)

## 2021-03-23 PROCEDURE — 3008F PR BODY MASS INDEX (BMI) DOCUMENTED: ICD-10-PCS | Mod: CPTII,S$GLB,, | Performed by: INTERNAL MEDICINE

## 2021-03-23 PROCEDURE — 3074F SYST BP LT 130 MM HG: CPT | Mod: CPTII,S$GLB,, | Performed by: INTERNAL MEDICINE

## 2021-03-23 PROCEDURE — 36415 COLL VENOUS BLD VENIPUNCTURE: CPT | Mod: PO | Performed by: INTERNAL MEDICINE

## 2021-03-23 PROCEDURE — 80053 COMPREHEN METABOLIC PANEL: CPT | Performed by: INTERNAL MEDICINE

## 2021-03-23 PROCEDURE — 85025 COMPLETE CBC W/AUTO DIFF WBC: CPT | Performed by: INTERNAL MEDICINE

## 2021-03-23 PROCEDURE — 99999 PR PBB SHADOW E&M-EST. PATIENT-LVL III: ICD-10-PCS | Mod: PBBFAC,,, | Performed by: INTERNAL MEDICINE

## 2021-03-23 PROCEDURE — 3074F PR MOST RECENT SYSTOLIC BLOOD PRESSURE < 130 MM HG: ICD-10-PCS | Mod: CPTII,S$GLB,, | Performed by: INTERNAL MEDICINE

## 2021-03-23 PROCEDURE — 99214 OFFICE O/P EST MOD 30 MIN: CPT | Mod: S$GLB,,, | Performed by: INTERNAL MEDICINE

## 2021-03-23 PROCEDURE — 99999 PR PBB SHADOW E&M-EST. PATIENT-LVL III: CPT | Mod: PBBFAC,,, | Performed by: INTERNAL MEDICINE

## 2021-03-23 PROCEDURE — 3008F BODY MASS INDEX DOCD: CPT | Mod: CPTII,S$GLB,, | Performed by: INTERNAL MEDICINE

## 2021-03-23 PROCEDURE — 99214 PR OFFICE/OUTPT VISIT, EST, LEVL IV, 30-39 MIN: ICD-10-PCS | Mod: S$GLB,,, | Performed by: INTERNAL MEDICINE

## 2021-03-23 PROCEDURE — 84153 ASSAY OF PSA TOTAL: CPT | Performed by: INTERNAL MEDICINE

## 2021-03-23 PROCEDURE — 80061 LIPID PANEL: CPT | Performed by: INTERNAL MEDICINE

## 2021-03-23 PROCEDURE — 3079F PR MOST RECENT DIASTOLIC BLOOD PRESSURE 80-89 MM HG: ICD-10-PCS | Mod: CPTII,S$GLB,, | Performed by: INTERNAL MEDICINE

## 2021-03-23 PROCEDURE — 3079F DIAST BP 80-89 MM HG: CPT | Mod: CPTII,S$GLB,, | Performed by: INTERNAL MEDICINE

## 2021-03-24 LAB
BASOPHILS # BLD AUTO: 0.03 K/UL (ref 0–0.2)
BASOPHILS NFR BLD: 0.2 % (ref 0–1.9)
DIFFERENTIAL METHOD: ABNORMAL
EOSINOPHIL # BLD AUTO: 0.2 K/UL (ref 0–0.5)
EOSINOPHIL NFR BLD: 1.5 % (ref 0–8)
ERYTHROCYTE [DISTWIDTH] IN BLOOD BY AUTOMATED COUNT: 14.3 % (ref 11.5–14.5)
HCT VFR BLD AUTO: 42.9 % (ref 40–54)
HGB BLD-MCNC: 14.1 G/DL (ref 14–18)
IMM GRANULOCYTES # BLD AUTO: 0.08 K/UL (ref 0–0.04)
IMM GRANULOCYTES NFR BLD AUTO: 0.7 % (ref 0–0.5)
LYMPHOCYTES # BLD AUTO: 3.4 K/UL (ref 1–4.8)
LYMPHOCYTES NFR BLD: 27.6 % (ref 18–48)
MCH RBC QN AUTO: 30.7 PG (ref 27–31)
MCHC RBC AUTO-ENTMCNC: 32.9 G/DL (ref 32–36)
MCV RBC AUTO: 93 FL (ref 82–98)
MONOCYTES # BLD AUTO: 1.1 K/UL (ref 0.3–1)
MONOCYTES NFR BLD: 8.6 % (ref 4–15)
NEUTROPHILS # BLD AUTO: 7.5 K/UL (ref 1.8–7.7)
NEUTROPHILS NFR BLD: 61.4 % (ref 38–73)
NRBC BLD-RTO: 0 /100 WBC
PLATELET # BLD AUTO: 307 K/UL (ref 150–350)
PMV BLD AUTO: 10.2 FL (ref 9.2–12.9)
RBC # BLD AUTO: 4.6 M/UL (ref 4.6–6.2)
WBC # BLD AUTO: 12.16 K/UL (ref 3.9–12.7)

## 2021-03-28 ENCOUNTER — PATIENT OUTREACH (OUTPATIENT)
Dept: ADMINISTRATIVE | Facility: OTHER | Age: 61
End: 2021-03-28

## 2021-03-29 ENCOUNTER — OFFICE VISIT (OUTPATIENT)
Dept: SURGERY | Facility: CLINIC | Age: 61
End: 2021-03-29
Payer: COMMERCIAL

## 2021-03-29 VITALS
DIASTOLIC BLOOD PRESSURE: 87 MMHG | WEIGHT: 183 LBS | HEART RATE: 76 BPM | BODY MASS INDEX: 24.14 KG/M2 | TEMPERATURE: 98 F | SYSTOLIC BLOOD PRESSURE: 156 MMHG

## 2021-03-29 DIAGNOSIS — Z01.818 PREOP TESTING: ICD-10-CM

## 2021-03-29 DIAGNOSIS — K40.90 RIGHT INGUINAL HERNIA: ICD-10-CM

## 2021-03-29 PROCEDURE — 1126F AMNT PAIN NOTED NONE PRSNT: CPT | Mod: S$GLB,,, | Performed by: SURGERY

## 2021-03-29 PROCEDURE — 99204 PR OFFICE/OUTPT VISIT, NEW, LEVL IV, 45-59 MIN: ICD-10-PCS | Mod: S$GLB,,, | Performed by: SURGERY

## 2021-03-29 PROCEDURE — 99999 PR PBB SHADOW E&M-EST. PATIENT-LVL IV: CPT | Mod: PBBFAC,,, | Performed by: SURGERY

## 2021-03-29 PROCEDURE — 3008F PR BODY MASS INDEX (BMI) DOCUMENTED: ICD-10-PCS | Mod: CPTII,S$GLB,, | Performed by: SURGERY

## 2021-03-29 PROCEDURE — 1126F PR PAIN SEVERITY QUANTIFIED, NO PAIN PRESENT: ICD-10-PCS | Mod: S$GLB,,, | Performed by: SURGERY

## 2021-03-29 PROCEDURE — 3008F BODY MASS INDEX DOCD: CPT | Mod: CPTII,S$GLB,, | Performed by: SURGERY

## 2021-03-29 PROCEDURE — 99999 PR PBB SHADOW E&M-EST. PATIENT-LVL IV: ICD-10-PCS | Mod: PBBFAC,,, | Performed by: SURGERY

## 2021-03-29 PROCEDURE — 99204 OFFICE O/P NEW MOD 45 MIN: CPT | Mod: S$GLB,,, | Performed by: SURGERY

## 2021-03-29 RX ORDER — SODIUM CHLORIDE 9 MG/ML
INJECTION, SOLUTION INTRAVENOUS CONTINUOUS
Status: CANCELLED | OUTPATIENT
Start: 2021-04-22

## 2021-04-19 ENCOUNTER — LAB VISIT (OUTPATIENT)
Dept: FAMILY MEDICINE | Facility: CLINIC | Age: 61
End: 2021-04-19
Payer: COMMERCIAL

## 2021-04-19 DIAGNOSIS — Z01.818 PREOP TESTING: ICD-10-CM

## 2021-04-19 PROCEDURE — 93010 EKG 12-LEAD: ICD-10-PCS | Mod: S$GLB,,, | Performed by: INTERNAL MEDICINE

## 2021-04-19 PROCEDURE — 93005 ELECTROCARDIOGRAM TRACING: CPT | Mod: S$GLB,,, | Performed by: SURGERY

## 2021-04-19 PROCEDURE — 93010 ELECTROCARDIOGRAM REPORT: CPT | Mod: S$GLB,,, | Performed by: INTERNAL MEDICINE

## 2021-04-19 PROCEDURE — 93005 EKG 12-LEAD: ICD-10-PCS | Mod: S$GLB,,, | Performed by: SURGERY

## 2021-04-19 PROCEDURE — U0003 INFECTIOUS AGENT DETECTION BY NUCLEIC ACID (DNA OR RNA); SEVERE ACUTE RESPIRATORY SYNDROME CORONAVIRUS 2 (SARS-COV-2) (CORONAVIRUS DISEASE [COVID-19]), AMPLIFIED PROBE TECHNIQUE, MAKING USE OF HIGH THROUGHPUT TECHNOLOGIES AS DESCRIBED BY CMS-2020-01-R: HCPCS | Performed by: SURGERY

## 2021-04-19 PROCEDURE — U0005 INFEC AGEN DETEC AMPLI PROBE: HCPCS | Performed by: SURGERY

## 2021-04-20 LAB — SARS-COV-2 RNA RESP QL NAA+PROBE: NOT DETECTED

## 2021-04-21 ENCOUNTER — ANESTHESIA EVENT (OUTPATIENT)
Dept: SURGERY | Facility: HOSPITAL | Age: 61
End: 2021-04-21
Payer: COMMERCIAL

## 2021-04-22 ENCOUNTER — HOSPITAL ENCOUNTER (OUTPATIENT)
Facility: HOSPITAL | Age: 61
Discharge: HOME | End: 2021-04-22
Attending: SURGERY | Admitting: SURGERY
Payer: COMMERCIAL

## 2021-04-22 ENCOUNTER — ANESTHESIA (OUTPATIENT)
Dept: SURGERY | Facility: HOSPITAL | Age: 61
End: 2021-04-22
Payer: COMMERCIAL

## 2021-04-22 VITALS
WEIGHT: 182 LBS | DIASTOLIC BLOOD PRESSURE: 72 MMHG | TEMPERATURE: 98 F | HEART RATE: 65 BPM | BODY MASS INDEX: 24.12 KG/M2 | SYSTOLIC BLOOD PRESSURE: 121 MMHG | OXYGEN SATURATION: 98 % | RESPIRATION RATE: 18 BRPM | HEIGHT: 73 IN

## 2021-04-22 DIAGNOSIS — K40.90 RIGHT INGUINAL HERNIA: Primary | ICD-10-CM

## 2021-04-22 PROCEDURE — D9220A PRA ANESTHESIA: Mod: CRNA,,, | Performed by: NURSE ANESTHETIST, CERTIFIED REGISTERED

## 2021-04-22 PROCEDURE — 99900103 DSU ONLY-NO CHARGE-INITIAL HR (STAT): Performed by: SURGERY

## 2021-04-22 PROCEDURE — 63600175 PHARM REV CODE 636 W HCPCS: Performed by: NURSE ANESTHETIST, CERTIFIED REGISTERED

## 2021-04-22 PROCEDURE — 37000008 HC ANESTHESIA 1ST 15 MINUTES: Performed by: SURGERY

## 2021-04-22 PROCEDURE — 37000009 HC ANESTHESIA EA ADD 15 MINS: Performed by: SURGERY

## 2021-04-22 PROCEDURE — D9220A PRA ANESTHESIA: Mod: ANES,,, | Performed by: ANESTHESIOLOGY

## 2021-04-22 PROCEDURE — 71000033 HC RECOVERY, INTIAL HOUR: Performed by: SURGERY

## 2021-04-22 PROCEDURE — 25000003 PHARM REV CODE 250: Performed by: ANESTHESIOLOGY

## 2021-04-22 PROCEDURE — 36000710: Performed by: SURGERY

## 2021-04-22 PROCEDURE — 25000003 PHARM REV CODE 250: Performed by: NURSE ANESTHETIST, CERTIFIED REGISTERED

## 2021-04-22 PROCEDURE — 63600175 PHARM REV CODE 636 W HCPCS: Performed by: SURGERY

## 2021-04-22 PROCEDURE — 49650 PR LAP,INGUINAL HERNIA REPR,INITIAL: ICD-10-PCS | Mod: RT,,, | Performed by: SURGERY

## 2021-04-22 PROCEDURE — 49650 LAP ING HERNIA REPAIR INIT: CPT | Mod: RT,,, | Performed by: SURGERY

## 2021-04-22 PROCEDURE — 99900104 DSU ONLY-NO CHARGE-EA ADD'L HR (STAT): Performed by: SURGERY

## 2021-04-22 PROCEDURE — 27201423 OPTIME MED/SURG SUP & DEVICES STERILE SUPPLY: Performed by: SURGERY

## 2021-04-22 PROCEDURE — C1781 MESH (IMPLANTABLE): HCPCS | Performed by: SURGERY

## 2021-04-22 PROCEDURE — D9220A PRA ANESTHESIA: ICD-10-PCS | Mod: CRNA,,, | Performed by: NURSE ANESTHETIST, CERTIFIED REGISTERED

## 2021-04-22 PROCEDURE — 25000003 PHARM REV CODE 250: Performed by: SURGERY

## 2021-04-22 PROCEDURE — D9220A PRA ANESTHESIA: ICD-10-PCS | Mod: ANES,,, | Performed by: ANESTHESIOLOGY

## 2021-04-22 PROCEDURE — 71000015 HC POSTOP RECOV 1ST HR: Performed by: SURGERY

## 2021-04-22 PROCEDURE — 36000711: Performed by: SURGERY

## 2021-04-22 DEVICE — MESH PROGRIP LAP 10X15CM RIGHT: Type: IMPLANTABLE DEVICE | Site: INGUINAL | Status: FUNCTIONAL

## 2021-04-22 RX ORDER — DEXAMETHASONE SODIUM PHOSPHATE 4 MG/ML
INJECTION, SOLUTION INTRA-ARTICULAR; INTRALESIONAL; INTRAMUSCULAR; INTRAVENOUS; SOFT TISSUE
Status: DISCONTINUED | OUTPATIENT
Start: 2021-04-22 | End: 2021-04-22

## 2021-04-22 RX ORDER — ONDANSETRON HYDROCHLORIDE 2 MG/ML
INJECTION, SOLUTION INTRAMUSCULAR; INTRAVENOUS
Status: DISCONTINUED | OUTPATIENT
Start: 2021-04-22 | End: 2021-04-22

## 2021-04-22 RX ORDER — SODIUM CHLORIDE 9 MG/ML
INJECTION, SOLUTION INTRAVENOUS CONTINUOUS
Status: DISCONTINUED | OUTPATIENT
Start: 2021-04-22 | End: 2021-04-22 | Stop reason: HOSPADM

## 2021-04-22 RX ORDER — NEOSTIGMINE METHYLSULFATE 1 MG/ML
INJECTION, SOLUTION INTRAVENOUS
Status: DISCONTINUED | OUTPATIENT
Start: 2021-04-22 | End: 2021-04-22

## 2021-04-22 RX ORDER — DIPHENHYDRAMINE HYDROCHLORIDE 50 MG/ML
25 INJECTION INTRAMUSCULAR; INTRAVENOUS EVERY 6 HOURS PRN
Status: DISCONTINUED | OUTPATIENT
Start: 2021-04-22 | End: 2021-04-22 | Stop reason: HOSPADM

## 2021-04-22 RX ORDER — SUCCINYLCHOLINE CHLORIDE 20 MG/ML
INJECTION INTRAMUSCULAR; INTRAVENOUS
Status: DISCONTINUED | OUTPATIENT
Start: 2021-04-22 | End: 2021-04-22

## 2021-04-22 RX ORDER — BUPIVACAINE HYDROCHLORIDE AND EPINEPHRINE 2.5; 5 MG/ML; UG/ML
INJECTION, SOLUTION EPIDURAL; INFILTRATION; INTRACAUDAL; PERINEURAL
Status: DISCONTINUED | OUTPATIENT
Start: 2021-04-22 | End: 2021-04-22 | Stop reason: HOSPADM

## 2021-04-22 RX ORDER — KETOROLAC TROMETHAMINE 30 MG/ML
INJECTION, SOLUTION INTRAMUSCULAR; INTRAVENOUS
Status: DISCONTINUED | OUTPATIENT
Start: 2021-04-22 | End: 2021-04-22

## 2021-04-22 RX ORDER — EPHEDRINE SULFATE 50 MG/ML
INJECTION, SOLUTION INTRAVENOUS
Status: DISCONTINUED | OUTPATIENT
Start: 2021-04-22 | End: 2021-04-22

## 2021-04-22 RX ORDER — ONDANSETRON 2 MG/ML
4 INJECTION INTRAMUSCULAR; INTRAVENOUS DAILY PRN
Status: DISCONTINUED | OUTPATIENT
Start: 2021-04-22 | End: 2021-04-22 | Stop reason: HOSPADM

## 2021-04-22 RX ORDER — SODIUM CHLORIDE 0.9 % (FLUSH) 0.9 %
3 SYRINGE (ML) INJECTION
Status: DISCONTINUED | OUTPATIENT
Start: 2021-04-22 | End: 2021-04-22 | Stop reason: HOSPADM

## 2021-04-22 RX ORDER — FENTANYL CITRATE 50 UG/ML
INJECTION, SOLUTION INTRAMUSCULAR; INTRAVENOUS
Status: DISCONTINUED | OUTPATIENT
Start: 2021-04-22 | End: 2021-04-22

## 2021-04-22 RX ORDER — PROPOFOL 10 MG/ML
VIAL (ML) INTRAVENOUS
Status: DISCONTINUED | OUTPATIENT
Start: 2021-04-22 | End: 2021-04-22

## 2021-04-22 RX ORDER — ACETAMINOPHEN 10 MG/ML
INJECTION, SOLUTION INTRAVENOUS
Status: DISCONTINUED | OUTPATIENT
Start: 2021-04-22 | End: 2021-04-22

## 2021-04-22 RX ORDER — CEFAZOLIN SODIUM 2 G/50ML
2 SOLUTION INTRAVENOUS
Status: COMPLETED | OUTPATIENT
Start: 2021-04-22 | End: 2021-04-22

## 2021-04-22 RX ORDER — SODIUM CHLORIDE 9 MG/ML
INJECTION, SOLUTION INTRAVENOUS CONTINUOUS
Status: DISCONTINUED | OUTPATIENT
Start: 2021-04-22 | End: 2021-04-22

## 2021-04-22 RX ORDER — FENTANYL CITRATE 50 UG/ML
25 INJECTION, SOLUTION INTRAMUSCULAR; INTRAVENOUS EVERY 5 MIN PRN
Status: DISCONTINUED | OUTPATIENT
Start: 2021-04-22 | End: 2021-04-22 | Stop reason: HOSPADM

## 2021-04-22 RX ORDER — HYDROCODONE BITARTRATE AND ACETAMINOPHEN 5; 325 MG/1; MG/1
1 TABLET ORAL EVERY 6 HOURS PRN
Qty: 10 TABLET | Refills: 0 | Status: SHIPPED | OUTPATIENT
Start: 2021-04-22 | End: 2021-06-25

## 2021-04-22 RX ORDER — OXYCODONE HYDROCHLORIDE 5 MG/1
5 TABLET ORAL
Status: DISCONTINUED | OUTPATIENT
Start: 2021-04-22 | End: 2021-04-22 | Stop reason: HOSPADM

## 2021-04-22 RX ORDER — HYDROMORPHONE HYDROCHLORIDE 2 MG/ML
0.2 INJECTION, SOLUTION INTRAMUSCULAR; INTRAVENOUS; SUBCUTANEOUS EVERY 5 MIN PRN
Status: DISCONTINUED | OUTPATIENT
Start: 2021-04-22 | End: 2021-04-22 | Stop reason: HOSPADM

## 2021-04-22 RX ORDER — ROCURONIUM BROMIDE 10 MG/ML
INJECTION, SOLUTION INTRAVENOUS
Status: DISCONTINUED | OUTPATIENT
Start: 2021-04-22 | End: 2021-04-22

## 2021-04-22 RX ORDER — LIDOCAINE HCL/PF 100 MG/5ML
SYRINGE (ML) INTRAVENOUS
Status: DISCONTINUED | OUTPATIENT
Start: 2021-04-22 | End: 2021-04-22

## 2021-04-22 RX ORDER — MIDAZOLAM HYDROCHLORIDE 1 MG/ML
INJECTION INTRAMUSCULAR; INTRAVENOUS
Status: DISCONTINUED | OUTPATIENT
Start: 2021-04-22 | End: 2021-04-22

## 2021-04-22 RX ORDER — LIDOCAINE HYDROCHLORIDE 10 MG/ML
1 INJECTION, SOLUTION EPIDURAL; INFILTRATION; INTRACAUDAL; PERINEURAL ONCE
Status: ACTIVE | OUTPATIENT
Start: 2021-04-22

## 2021-04-22 RX ORDER — SODIUM CHLORIDE 0.9 % (FLUSH) 0.9 %
3 SYRINGE (ML) INJECTION EVERY 8 HOURS
Status: DISCONTINUED | OUTPATIENT
Start: 2021-04-22 | End: 2021-04-22 | Stop reason: HOSPADM

## 2021-04-22 RX ORDER — PHENYLEPHRINE HYDROCHLORIDE 10 MG/ML
INJECTION INTRAVENOUS
Status: DISCONTINUED | OUTPATIENT
Start: 2021-04-22 | End: 2021-04-22

## 2021-04-22 RX ADMIN — ROCURONIUM BROMIDE 5 MG: 10 INJECTION, SOLUTION INTRAVENOUS at 07:04

## 2021-04-22 RX ADMIN — EPHEDRINE SULFATE 10 MG: 50 INJECTION, SOLUTION INTRAMUSCULAR; INTRAVENOUS; SUBCUTANEOUS at 08:04

## 2021-04-22 RX ADMIN — PROPOFOL 200 MG: 10 INJECTION, EMULSION INTRAVENOUS at 07:04

## 2021-04-22 RX ADMIN — ONDANSETRON 4 MG: 2 INJECTION, SOLUTION INTRAMUSCULAR; INTRAVENOUS at 07:04

## 2021-04-22 RX ADMIN — OXYCODONE HYDROCHLORIDE 5 MG: 5 TABLET ORAL at 09:04

## 2021-04-22 RX ADMIN — PHENYLEPHRINE HYDROCHLORIDE 100 MCG: 10 INJECTION INTRAVENOUS at 07:04

## 2021-04-22 RX ADMIN — SODIUM CHLORIDE, SODIUM GLUCONATE, SODIUM ACETATE, POTASSIUM CHLORIDE, MAGNESIUM CHLORIDE, SODIUM PHOSPHATE, DIBASIC, AND POTASSIUM PHOSPHATE: .53; .5; .37; .037; .03; .012; .00082 INJECTION, SOLUTION INTRAVENOUS at 08:04

## 2021-04-22 RX ADMIN — GLYCOPYRROLATE 0.4 MG: 0.2 INJECTION, SOLUTION INTRAMUSCULAR; INTRAVITREAL at 08:04

## 2021-04-22 RX ADMIN — FENTANYL CITRATE 100 MCG: 50 INJECTION, SOLUTION INTRAMUSCULAR; INTRAVENOUS at 07:04

## 2021-04-22 RX ADMIN — NEOSTIGMINE METHYLSULFATE 3 MG: 1 INJECTION INTRAVENOUS at 08:04

## 2021-04-22 RX ADMIN — ACETAMINOPHEN 1000 MG: 10 INJECTION, SOLUTION INTRAVENOUS at 07:04

## 2021-04-22 RX ADMIN — KETOROLAC TROMETHAMINE 30 MG: 30 INJECTION, SOLUTION INTRAMUSCULAR; INTRAVENOUS at 08:04

## 2021-04-22 RX ADMIN — CEFAZOLIN SODIUM 2 G: 2 SOLUTION INTRAVENOUS at 07:04

## 2021-04-22 RX ADMIN — MIDAZOLAM HYDROCHLORIDE 2 MG: 1 INJECTION, SOLUTION INTRAMUSCULAR; INTRAVENOUS at 07:04

## 2021-04-22 RX ADMIN — SUCCINYLCHOLINE CHLORIDE 160 MG: 20 INJECTION, SOLUTION INTRAMUSCULAR; INTRAVENOUS; PARENTERAL at 07:04

## 2021-04-22 RX ADMIN — DEXAMETHASONE SODIUM PHOSPHATE 4 MG: 4 INJECTION, SOLUTION INTRA-ARTICULAR; INTRALESIONAL; INTRAMUSCULAR; INTRAVENOUS; SOFT TISSUE at 07:04

## 2021-04-22 RX ADMIN — SODIUM CHLORIDE, SODIUM GLUCONATE, SODIUM ACETATE, POTASSIUM CHLORIDE, MAGNESIUM CHLORIDE, SODIUM PHOSPHATE, DIBASIC, AND POTASSIUM PHOSPHATE: .53; .5; .37; .037; .03; .012; .00082 INJECTION, SOLUTION INTRAVENOUS at 06:04

## 2021-04-22 RX ADMIN — ROCURONIUM BROMIDE 35 MG: 10 INJECTION, SOLUTION INTRAVENOUS at 07:04

## 2021-04-22 RX ADMIN — EPHEDRINE SULFATE 20 MG: 50 INJECTION, SOLUTION INTRAMUSCULAR; INTRAVENOUS; SUBCUTANEOUS at 07:04

## 2021-04-22 RX ADMIN — LIDOCAINE HYDROCHLORIDE 80 MG: 20 INJECTION, SOLUTION INTRAVENOUS at 07:04

## 2021-04-22 RX ADMIN — PHENYLEPHRINE HYDROCHLORIDE 200 MCG: 10 INJECTION INTRAVENOUS at 07:04

## 2021-04-22 RX ADMIN — GLYCOPYRROLATE 0.2 MG: 0.2 INJECTION, SOLUTION INTRAMUSCULAR; INTRAVITREAL at 07:04

## 2021-05-10 ENCOUNTER — OFFICE VISIT (OUTPATIENT)
Dept: SURGERY | Facility: CLINIC | Age: 61
End: 2021-05-10
Payer: COMMERCIAL

## 2021-05-10 VITALS — BODY MASS INDEX: 24.4 KG/M2 | TEMPERATURE: 98 F | WEIGHT: 184.94 LBS

## 2021-05-10 DIAGNOSIS — Z98.890 POST-OPERATIVE STATE: Primary | ICD-10-CM

## 2021-05-10 PROCEDURE — 3008F BODY MASS INDEX DOCD: CPT | Mod: CPTII,S$GLB,, | Performed by: SURGERY

## 2021-05-10 PROCEDURE — 1125F PR PAIN SEVERITY QUANTIFIED, PAIN PRESENT: ICD-10-PCS | Mod: S$GLB,,, | Performed by: SURGERY

## 2021-05-10 PROCEDURE — 1125F AMNT PAIN NOTED PAIN PRSNT: CPT | Mod: S$GLB,,, | Performed by: SURGERY

## 2021-05-10 PROCEDURE — 99999 PR PBB SHADOW E&M-EST. PATIENT-LVL II: ICD-10-PCS | Mod: PBBFAC,,, | Performed by: SURGERY

## 2021-05-10 PROCEDURE — 99024 PR POST-OP FOLLOW-UP VISIT: ICD-10-PCS | Mod: S$GLB,,, | Performed by: SURGERY

## 2021-05-10 PROCEDURE — 99999 PR PBB SHADOW E&M-EST. PATIENT-LVL II: CPT | Mod: PBBFAC,,, | Performed by: SURGERY

## 2021-05-10 PROCEDURE — 99024 POSTOP FOLLOW-UP VISIT: CPT | Mod: S$GLB,,, | Performed by: SURGERY

## 2021-05-10 PROCEDURE — 3008F PR BODY MASS INDEX (BMI) DOCUMENTED: ICD-10-PCS | Mod: CPTII,S$GLB,, | Performed by: SURGERY

## 2021-06-25 ENCOUNTER — OFFICE VISIT (OUTPATIENT)
Dept: FAMILY MEDICINE | Facility: CLINIC | Age: 61
End: 2021-06-25
Payer: COMMERCIAL

## 2021-06-25 VITALS
TEMPERATURE: 98 F | SYSTOLIC BLOOD PRESSURE: 120 MMHG | HEIGHT: 73 IN | BODY MASS INDEX: 24.31 KG/M2 | OXYGEN SATURATION: 99 % | DIASTOLIC BLOOD PRESSURE: 80 MMHG | HEART RATE: 90 BPM | WEIGHT: 183.44 LBS

## 2021-06-25 DIAGNOSIS — I10 ESSENTIAL HYPERTENSION: Primary | ICD-10-CM

## 2021-06-25 DIAGNOSIS — Z91.89 RISK FOR CORONARY ARTERY DISEASE GREATER THAN 20% IN NEXT 10 YEARS: ICD-10-CM

## 2021-06-25 PROCEDURE — 3079F DIAST BP 80-89 MM HG: CPT | Mod: CPTII,S$GLB,, | Performed by: INTERNAL MEDICINE

## 2021-06-25 PROCEDURE — 99999 PR PBB SHADOW E&M-EST. PATIENT-LVL III: ICD-10-PCS | Mod: PBBFAC,,, | Performed by: INTERNAL MEDICINE

## 2021-06-25 PROCEDURE — 3074F SYST BP LT 130 MM HG: CPT | Mod: CPTII,S$GLB,, | Performed by: INTERNAL MEDICINE

## 2021-06-25 PROCEDURE — 99999 PR PBB SHADOW E&M-EST. PATIENT-LVL III: CPT | Mod: PBBFAC,,, | Performed by: INTERNAL MEDICINE

## 2021-06-25 PROCEDURE — 3074F PR MOST RECENT SYSTOLIC BLOOD PRESSURE < 130 MM HG: ICD-10-PCS | Mod: CPTII,S$GLB,, | Performed by: INTERNAL MEDICINE

## 2021-06-25 PROCEDURE — 99214 PR OFFICE/OUTPT VISIT, EST, LEVL IV, 30-39 MIN: ICD-10-PCS | Mod: S$GLB,,, | Performed by: INTERNAL MEDICINE

## 2021-06-25 PROCEDURE — 3008F PR BODY MASS INDEX (BMI) DOCUMENTED: ICD-10-PCS | Mod: CPTII,S$GLB,, | Performed by: INTERNAL MEDICINE

## 2021-06-25 PROCEDURE — 1126F PR PAIN SEVERITY QUANTIFIED, NO PAIN PRESENT: ICD-10-PCS | Mod: S$GLB,,, | Performed by: INTERNAL MEDICINE

## 2021-06-25 PROCEDURE — 99214 OFFICE O/P EST MOD 30 MIN: CPT | Mod: S$GLB,,, | Performed by: INTERNAL MEDICINE

## 2021-06-25 PROCEDURE — 1126F AMNT PAIN NOTED NONE PRSNT: CPT | Mod: S$GLB,,, | Performed by: INTERNAL MEDICINE

## 2021-06-25 PROCEDURE — 3079F PR MOST RECENT DIASTOLIC BLOOD PRESSURE 80-89 MM HG: ICD-10-PCS | Mod: CPTII,S$GLB,, | Performed by: INTERNAL MEDICINE

## 2021-06-25 PROCEDURE — 3008F BODY MASS INDEX DOCD: CPT | Mod: CPTII,S$GLB,, | Performed by: INTERNAL MEDICINE

## 2021-06-25 RX ORDER — ATORVASTATIN CALCIUM 10 MG/1
10 TABLET, FILM COATED ORAL DAILY
Qty: 90 TABLET | Refills: 1 | Status: ON HOLD | OUTPATIENT
Start: 2021-06-25 | End: 2024-03-23 | Stop reason: HOSPADM

## 2021-09-24 ENCOUNTER — LAB VISIT (OUTPATIENT)
Dept: LAB | Facility: HOSPITAL | Age: 61
End: 2021-09-24
Attending: INTERNAL MEDICINE
Payer: COMMERCIAL

## 2021-09-24 DIAGNOSIS — I10 ESSENTIAL HYPERTENSION: ICD-10-CM

## 2021-09-24 PROCEDURE — 36415 COLL VENOUS BLD VENIPUNCTURE: CPT | Mod: PO | Performed by: INTERNAL MEDICINE

## 2021-09-24 PROCEDURE — 80048 BASIC METABOLIC PNL TOTAL CA: CPT | Performed by: INTERNAL MEDICINE

## 2021-09-25 LAB
ANION GAP SERPL CALC-SCNC: 9 MMOL/L (ref 8–16)
BUN SERPL-MCNC: 19 MG/DL (ref 8–23)
CALCIUM SERPL-MCNC: 9.7 MG/DL (ref 8.7–10.5)
CHLORIDE SERPL-SCNC: 104 MMOL/L (ref 95–110)
CO2 SERPL-SCNC: 27 MMOL/L (ref 23–29)
CREAT SERPL-MCNC: 1.4 MG/DL (ref 0.5–1.4)
EST. GFR  (AFRICAN AMERICAN): >60 ML/MIN/1.73 M^2
EST. GFR  (NON AFRICAN AMERICAN): 53.1 ML/MIN/1.73 M^2
GLUCOSE SERPL-MCNC: 73 MG/DL (ref 70–110)
POTASSIUM SERPL-SCNC: 4.3 MMOL/L (ref 3.5–5.1)
SODIUM SERPL-SCNC: 140 MMOL/L (ref 136–145)

## 2021-11-30 ENCOUNTER — OFFICE VISIT (OUTPATIENT)
Dept: URGENT CARE | Facility: CLINIC | Age: 61
End: 2021-11-30

## 2021-11-30 DIAGNOSIS — Z02.89 ENCOUNTER FOR EXAMINATION REQUIRED BY DEPARTMENT OF TRANSPORTATION (DOT): Primary | ICD-10-CM

## 2021-11-30 PROCEDURE — 99499 PHYSICAL, RECERT DOT/CDL: ICD-10-PCS | Mod: S$GLB,,, | Performed by: FAMILY MEDICINE

## 2021-11-30 PROCEDURE — 99499 UNLISTED E&M SERVICE: CPT | Mod: S$GLB,,, | Performed by: FAMILY MEDICINE

## 2022-11-28 ENCOUNTER — OCCUPATIONAL HEALTH (OUTPATIENT)
Dept: URGENT CARE | Facility: CLINIC | Age: 62
End: 2022-11-28

## 2022-11-28 DIAGNOSIS — Z00.00 ENCOUNTER FOR PHYSICAL EXAMINATION: Primary | ICD-10-CM

## 2022-11-28 LAB — COLLECTION ONLY: NORMAL

## 2022-11-28 PROCEDURE — 99499 DOT PHYSICAL: ICD-10-PCS | Mod: S$GLB,,, | Performed by: EMERGENCY MEDICINE

## 2022-11-28 PROCEDURE — 99499 UNLISTED E&M SERVICE: CPT | Mod: S$GLB,,, | Performed by: EMERGENCY MEDICINE

## 2023-11-08 ENCOUNTER — OCCUPATIONAL HEALTH (OUTPATIENT)
Dept: URGENT CARE | Facility: CLINIC | Age: 63
End: 2023-11-08
Payer: COMMERCIAL

## 2023-11-08 DIAGNOSIS — Z00.00 ENCOUNTER FOR PHYSICAL EXAMINATION: Primary | ICD-10-CM

## 2023-11-08 LAB — COLLECTION ONLY: NORMAL

## 2023-11-08 PROCEDURE — 99499 UNLISTED E&M SERVICE: CPT | Mod: S$GLB,,,

## 2023-11-08 PROCEDURE — 99499 DOT PHYSICAL: ICD-10-PCS | Mod: S$GLB,,,

## 2024-03-21 PROBLEM — R53.1 RIGHT SIDED WEAKNESS: Status: ACTIVE | Noted: 2024-03-21

## 2024-03-21 PROBLEM — Z71.89 ACP (ADVANCE CARE PLANNING): Status: ACTIVE | Noted: 2024-03-21

## 2024-03-22 PROBLEM — I63.81 ACUTE LACUNAR INFARCTION: Status: ACTIVE | Noted: 2024-03-22

## 2024-04-02 ENCOUNTER — OFFICE VISIT (OUTPATIENT)
Dept: FAMILY MEDICINE | Facility: CLINIC | Age: 64
End: 2024-04-02
Payer: MEDICARE

## 2024-04-02 VITALS
DIASTOLIC BLOOD PRESSURE: 90 MMHG | HEART RATE: 77 BPM | SYSTOLIC BLOOD PRESSURE: 140 MMHG | HEIGHT: 73 IN | OXYGEN SATURATION: 97 % | BODY MASS INDEX: 25.8 KG/M2 | WEIGHT: 194.69 LBS

## 2024-04-02 DIAGNOSIS — Z09 HOSPITAL DISCHARGE FOLLOW-UP: Primary | ICD-10-CM

## 2024-04-02 DIAGNOSIS — Z72.0 TOBACCO USE: ICD-10-CM

## 2024-04-02 DIAGNOSIS — I10 ESSENTIAL HYPERTENSION: ICD-10-CM

## 2024-04-02 DIAGNOSIS — I63.81 ACUTE LACUNAR INFARCTION: ICD-10-CM

## 2024-04-02 PROCEDURE — 99214 OFFICE O/P EST MOD 30 MIN: CPT | Mod: PBBFAC,PO | Performed by: PHYSICIAN ASSISTANT

## 2024-04-02 PROCEDURE — 99999 PR PBB SHADOW E&M-EST. PATIENT-LVL IV: CPT | Mod: PBBFAC,,, | Performed by: PHYSICIAN ASSISTANT

## 2024-04-02 NOTE — PROGRESS NOTES
"Subjective:      Patient ID: Terence Bianchi is a 63 y.o. male.    Chief Complaint: Hospital Follow Up    Patient is new to me.    HPI  Patient has PMH of HTN, colon polyps, and tobacco use.  In the process of quitting smoking cigarettes.    Patient went to [facility] 3/21/2024 to 3/23/2024 with right-sided weakness and diagnosed with lacunar infarct.      Patient reports some residual right-sided weakness since CVA.  Denies chest pain, shortness of breath, headaches, confusion.  Unsure of his amlodipine dosage.  HCTZ made him urinate very urgently and frequently.    BP Readings from Last 3 Encounters:   04/02/24 (!) 140/90   03/23/24 (!) 151/106   06/25/21 120/80      Review of Systems   Constitutional:  Negative for appetite change, chills and fever.   Respiratory:  Negative for shortness of breath.    Cardiovascular:  Negative for chest pain.   Gastrointestinal:  Negative for abdominal pain, blood in stool, constipation, diarrhea, nausea and vomiting.   Genitourinary:  Negative for dysuria, frequency and hematuria.   Neurological:  Positive for weakness (right-sided). Negative for dizziness, light-headedness and headaches.       Objective:   BP (!) 140/90   Pulse 77   Ht 6' 1" (1.854 m)   Wt 88.3 kg (194 lb 10.7 oz)   SpO2 97%   BMI 25.68 kg/m²     Physical Exam  Vitals reviewed.   Constitutional:       Appearance: Normal appearance. He is well-developed.   HENT:      Head: Normocephalic and atraumatic.      Right Ear: External ear normal.      Left Ear: External ear normal.   Eyes:      Conjunctiva/sclera: Conjunctivae normal.   Cardiovascular:      Rate and Rhythm: Normal rate and regular rhythm.      Heart sounds: Normal heart sounds. No murmur heard.     No friction rub. No gallop.   Pulmonary:      Effort: Pulmonary effort is normal. No respiratory distress.      Breath sounds: Normal breath sounds. No wheezing, rhonchi or rales.   Musculoskeletal:         General: Normal range of motion.   Skin:     General: " Skin is warm and dry.      Findings: No rash.   Neurological:      General: No focal deficit present.      Mental Status: He is alert and oriented to person, place, and time.      Comments: Strength and neuro exams are normal and symmetrical.   Psychiatric:         Mood and Affect: Mood normal.         Behavior: Behavior normal.         Judgment: Judgment normal.       Assessment:      1. Hospital discharge follow-up    2. Acute lacunar infarction    3. Essential hypertension    4. Tobacco use       Plan:   1. Hospital discharge follow-up  Improved.    2. Acute lacunar infarction  Minor residual deficits, but almost full recovery from stroke.  - Comprehensive Metabolic Panel; Future  - Ambulatory referral/consult to Neurology; Future    3. Essential hypertension  Borderline.  Will continue to monitor.  Asked patient to double check medications at home.    4. Tobacco use  Quitting now.    Follow up with me in one month.  Patient agreed with plan and expressed understanding.    Thank you for allowing me to serve you,

## 2024-04-03 ENCOUNTER — TELEPHONE (OUTPATIENT)
Dept: NEUROLOGY | Facility: CLINIC | Age: 64
End: 2024-04-03
Payer: MEDICARE

## 2024-04-03 NOTE — TELEPHONE ENCOUNTER
Spoke to the pt, appt scheduled on 4/10/24 at 0800 with Angie Bowers for CVA.  Date, time and location discussed.

## 2024-04-09 ENCOUNTER — TELEPHONE (OUTPATIENT)
Dept: NEUROLOGY | Facility: CLINIC | Age: 64
End: 2024-04-09
Payer: MEDICARE

## 2024-04-10 ENCOUNTER — OFFICE VISIT (OUTPATIENT)
Dept: NEUROLOGY | Facility: CLINIC | Age: 64
End: 2024-04-10
Payer: MEDICARE

## 2024-04-10 DIAGNOSIS — I63.81 LEFT SIDED LACUNAR STROKE: Primary | ICD-10-CM

## 2024-04-10 DIAGNOSIS — Z72.0 TOBACCO USE: ICD-10-CM

## 2024-04-10 DIAGNOSIS — R53.1 RIGHT SIDED WEAKNESS: ICD-10-CM

## 2024-04-10 PROCEDURE — 99204 OFFICE O/P NEW MOD 45 MIN: CPT | Mod: 95,,, | Performed by: NURSE PRACTITIONER

## 2024-04-10 NOTE — PROGRESS NOTES
"NEUROLOGY  Outpatient Consultation Visit     [facility]  [address] [facility], [place] [address]  [phone] (office) / [phone] (fax)    Patient Name:  Terence Bianchi  :  1960  MR #:  [id number]  [id number]    Date of Neurology Consult: 04/10/2024  Name of Provider: SERVANDO South    Other Physicians:  Juan Ramon Villa DO (Primary Care Physician); Juan Ramon Villa DO (Referring)      Chief Complaint: Hospital Follow Up      History of Present Illness (HPI):  Terence Bianchi is a right handed 63 y.o. male with a PMHX of HTN, colon polyps, and tobacco use     Patient presents today for hospital follow up. As per EMR he presented to the ED with acute right side weakness that began during the night before admit. MRI brain scan confirmed acute left thalamic stroke. He admits to not being very compliant with his BP medications.     Since discharge he has been feeling OK. He still has some weakness to his right side and difficulty writing his name. He is not in outpatient therapy.   His BP medication was changed and is now diligent about taking his medications daily.   He is not currently taking Lipitor as he didn't know the prescription was sent to his pharmacy. He is maintained on aspirin 81 mg daily. He has cut down smoking to 5 cigarettes a day. He is wanting to quit.   All recent nDictation #1  MRN:[id number]  CSN:[id number] eurological testing discussed with the patient.         D/C summary 3/21/2024  "63-year-old male with hypertension, hyperlipidemia and tobacco use who presented to the ER on  with complaints of right-sided weakness.  Patient admits to being incompletely compliant with his antihypertensives.  He felt well going to sleep last night.  At about midnight he woke up to go to the bathroom as he often does because he takes diuretic.  However he noticed that he was uncoordinated and falling to his right side.  Stayed up for several hours and noticed " that he had difficulty holding on to objects (he is right-handed).  He went back to sleep and woke up the morning with persistent symptoms.  Therefore he came to the emergency room.       In the emergency room he was normotensive.  Laboratory workup was unrevealing.  CT head was negative.  He was out of the timeframe for TNK.  NIH score was 2.  Hospital Medicine has been consulted for further evaluation management.       * No surgery found *       Hospital Course:   63-year-old gentleman with underlying history of hypertension presents with right-sided weakness and finding of new lacunar infarct.  This was confirmed on MRI.  Plan to place on aspirin, statin and resume antihypertensive regimen.  Antihypertensive regimen will be amlodipine 10 mg daily with losartan at night.  Seems to have allergy to lisinopril and intolerant to Maxzide.  Will continue Lipitor moderate dose at 40 mg and aspirin.  Will participate in outpatient therapy          Past Medical, Surgical, Family & Social History:   Past Medical History:   Diagnosis Date    BRVO (branch retinal vein occlusion)     OD    Cataract     High cholesterol     Hypertension     Hypertensive retinopathy of both eyes      Past Surgical History:   Procedure Laterality Date    COLONOSCOPY N/A 8/26/2020    Procedure: COLONOSCOPY;  Surgeon: Mitesh Peters MD;  Location: [facility] ENDO;  Service: Endoscopy;  Laterality: N/A;    CYST REMOVAL Right     hand as a child    EYE SURGERY Right 2020    ROBOT-ASSISTED LAPAROSCOPIC REPAIR OF INGUINAL HERNIA Right 4/22/2021    Procedure: ROBOTIC REPAIR, HERNIA, INGUINAL;  Surgeon: Salvatore Shaikh MD;  Location: [facility] OR;  Service: General;  Laterality: Right;     Family History   Problem Relation Age of Onset    Hypertension Mother     Stroke Mother     Cancer Father     Cancer Maternal Grandmother     Amblyopia Neg Hx     Blindness Neg Hx     Cataracts Neg Hx     Diabetes Neg Hx     Glaucoma Neg Hx     Macular degeneration Neg  Hx     Retinal detachment Neg Hx     Strabismus Neg Hx     Thyroid disease Neg Hx      Alcohol use:  reports no history of alcohol use.   (Of note, 0.6 oz = 1 beer or 6 oz = 10 beers).  Tobacco use:  reports that he has been smoking cigarettes. He has never used smokeless tobacco.  Street drug use:  reports that he does not currently use drugs.  Allergies: Lisinopril.    Home Medications:     Current Outpatient Medications:     amLODIPine (NORVASC) 10 MG tablet, Take 1 tablet (10 mg total) by mouth once daily., Disp: 90 tablet, Rfl: 3    aspirin (ECOTRIN) 81 MG EC tablet, Take 1 tablet (81 mg total) by mouth once daily., Disp: , Rfl: 0    atorvastatin (LIPITOR) 40 MG tablet, Take 1 tablet (40 mg total) by mouth every evening. (Patient not taking: Reported on 4/2/2024), Disp: 90 tablet, Rfl: 3  No current facility-administered medications for this visit.    Facility-Administered Medications Ordered in Other Visits:     electrolyte-S (ISOLYTE), , Intravenous, Continuous, Nile Ambriz MD, Stopped at 04/22/21 0853    LIDOcaine (PF) 10 mg/ml (1%) injection 10 mg, 1 mL, Intradermal, Once, Nile Ambriz MD    Physical Examination: limited exam due to being virtual  There were no vitals taken for this visit.    GENERAL:  General appearance: Well, non-toxic appearing.  No apparent distress.  Neck: supple.  .    MENTAL STATUS:  Alertness, attention span & concentration: normal.  Language: normal.  Orientation to self, place & time:  normal.  Memory, recent & remote: normal.  Fund of knowledge: normal.      SPEECH:  Clear and fluent.  .        Diagnostic Data Reviewed:   I have personally reviewed provider notes, labs and imaging made available to me today.     Imaging:  MRI Brain Without Contrast 3/2024    Narrative  EXAMINATION:  MRI BRAIN WITHOUT CONTRAST    CLINICAL HISTORY:  Right-sided numbness, weakness.    TECHNIQUE:  Multiplanar multisequence MRI of the brain without intravenous contrast.    COMPARISON:  CT  head 03/21/2024    FINDINGS:  There is 11 x 7 mm focus of restricted diffusion within the left thalamus compatible with acute lacunar infarct.    There is chronic involutional change.  There is chronic white matter microischemic change.  No acute intracranial hemorrhage, extra-axial fluid collection, hydrocephalus, mass effect, or midline shift is identified.  The visualized vascular flow voids appear intact.  The visualized paranasal sinuses are clear.  The visualized mastoid air cells are clear.    Impression  1. Acute lacunar infarct of the left thalamus.  2. Epic message was sent to Dr. Manriquez with [facility] at 18:08 hours on 03/21/2024.        CT Head Without Contrast 3/2024    Narrative  EXAMINATION:  CT HEAD WITHOUT CONTRAST    CLINICAL HISTORY:  Neuro deficit, acute, stroke suspected;    TECHNIQUE:  Low dose axial CT images obtained throughout the head without intravenous contrast. Sagittal and coronal reconstructions were performed.  Automated exposure control was utilized to limit radiation exposure to the patient.  Total DLP for this study was  635 mgycm.    COMPARISON:  No prior pertinent study is currently available.    FINDINGS:  There is no acute brain parenchymal finding.  There is no parenchymal mass, hemorrhage, edema or major vascular distribution infarct.    Mild volume loss is noted.  Findings suggestive of chronic microvascular ischemia are noted.  Heterogeneous density of the brainstem is probably secondary to both degeneration of along white matter tracts and to the hardening artifact from the skull base.  Intracranial calcific atherosclerosis is present.  This study demonstrates no acute hemorrhage. Please note the sensitivity of CT for subarachnoid hemorrhage is at best approximately 90%.    Skull/included extracranial contents: There is no displaced  fracture. Included mastoid air cells and paranasal sinuses are clear.    Impression  1. No acute intracranial abnormality.  Please  note that chronic ischemic changes could obscure superimposed acute ischemia.    CTA Head and Neck (xpd) 3/2024    Narrative  EXAMINATION:  CTA HEAD AND NECK (XPD)    CLINICAL HISTORY:  Stroke.    TECHNIQUE:  Axial CTA images of the head and neck were obtained with intravenous contrast.  Coronal and sagittal reformations were obtained.  3D reconstructions were obtained.  Automated exposure control utilized to reduce radiation dose.  Total exam DLP is 503 mGy cm.  NASCET criteria was utilized for carotid artery evaluation.    COMPARISON:  CT head and MRI brain 03/21/2024    FINDINGS:  There is motion artifact.  The vertebral arteries, common carotid arteries, carotid bulbs, and cervical internal carotid arteries demonstrate no flow-limiting stenosis, aneurysm, dissection, or occlusion.  There is calcified and noncalcified plaque within the intracranial internal carotid arteries with 50% narrowing on the right.  No significant narrowing on the left.  The anterior cerebral arteries, middle cerebral arteries, posterior cerebral arteries, and basilar artery demonstrate no flow-limiting stenosis, aneurysm, dissection, or occlusion.  There is developmentally diminutive right JAVIER A1 segment noting robust left JAVIER A1 segment.  On delayed imaging the dural venous sinuses demonstrate no thrombosis.    Impression  1. Approximately 50% stenosis right internal carotid artery intracranial level.          Cardiac:  TTE 3/2024:    Left Ventricle: The left ventricle is normal in size. Normal wall thickness. There is mild concentric hypertrophy. There is low normal systolic function. Ejection fraction by visual approximation is 50%. There is normal diastolic function.    Right Ventricle: Normal right ventricular cavity size. Wall thickness is normal. Right ventricle wall motion  is normal. Systolic function is normal.    Left Atrium: Agitated saline study of the atrial septum is negative after vasalva maneuver, suggesting absence of  "intracardiac shunt at the atrial level. No patent foramen ovale.    Mitral Valve: There is mild regurgitation.    Pulmonary Artery: The estimated pulmonary artery systolic pressure is 21 mmHg.    IVC/SVC: Normal venous pressure at 3 mmHg.     Labs:  Lab Results   Component Value Date    WBC 9.48 03/23/2024    HGB 16.2 03/23/2024    HCT 47.3 03/23/2024     03/23/2024    MCV 88 03/23/2024    RDW 14.3 03/23/2024     Lab Results   Component Value Date     03/23/2024    K 4.0 03/23/2024     03/23/2024    CO2 25 03/23/2024    BUN 23 (H) 03/23/2024    CREATININE 1.45 (H) 03/23/2024     03/23/2024    CALCIUM 9.5 03/23/2024    MG 2.1 03/22/2024    PHOS 4.0 03/22/2024     Lab Results   Component Value Date    PROT 7.6 03/23/2024    ALBUMIN 4.1 03/23/2024    BILITOT 0.6 03/23/2024    AST 37 03/23/2024    ALKPHOS 65 03/23/2024    ALT 21 03/23/2024     Lab Results   Component Value Date    INR 1.1 03/22/2024     Lab Results   Component Value Date    CHOL 193 03/22/2024    HDL 45 03/22/2024    LDLCALC 132.4 03/22/2024    TRIG 78 03/22/2024    CHOLHDL 23.3 03/22/2024     Lab Results   Component Value Date    HGBA1C 5.8 (H) 03/21/2024      No results found for: "KHCQNIGY82"  No results found for: "FOLATE"  Lab Results   Component Value Date    TSH 1.600 03/21/2024     No results found for: "VITAMINB1"  No results found for: "RPR"        Assessment and Plan:  Terence Bianchi is a 63 y.o. male.    Problem List Items Addressed This Visit          Neuro    Right sided weakness    Current Assessment & Plan     Referral to PT/OT         Left sided lacunar stroke - Primary    Current Assessment & Plan     Presented to ED with persistent right side weakness and uncoordinated    - out of window for TNK  CTH neg acute  MRI brain noted with acute lacunar infarct to left thalamus   - etiology small vessel  CTA without LVO; 50% right ICA stenosis  TTE without PFO; no LAE    Cont ASA QD for secondary stroke " prevention  Work with PCP in controlling vascular risk factors:   - BP management; compliance discussed   - ; cont HI statin for LDL goal <70   - A1c 5.8%   - diet modification   - stop smoking!    Stroke education discussed            Other    Tobacco use    Current Assessment & Plan     Vascular risk factor  Smoking cessation encouraged  Pt wants to quit                          Important to note, also  has a past medical history of BRVO (branch retinal vein occlusion), Cataract, High cholesterol, Hypertension, and Hypertensive retinopathy of both eyes.            The patient will return to clinic in 1-2 weeks        All questions were answered and patient is comfortable with the plan.       Thank you very much for the opportunity to assist in this patient's care.    If you have any questions or concerns, please do not hesitate to contact me at any time.    Sincerely,     SERVANDO South  [facility] [place]       The patient location is: [place], [place]  The chief complaint leading to consultation is: hospital f/u    Visit type: audiovisual    Face to Face time with patient: 45 minutes of total time spent on the encounter, which includes face to face time and non-face to face time preparing to see the patient (eg, review of tests), Obtaining and/or reviewing separately obtained history, Documenting clinical information in the electronic or other health record, Independently interpreting results (not separately reported) and communicating results to the patient/family/caregiver, or Care coordination (not separately reported).         Each patient to whom he or she provides medical services by telemedicine is:  (1) informed of the relationship between the physician and patient and the respective role of any other health care provider with respect to management of the patient; and (2) notified that he or she may decline to receive medical services by telemedicine and may  withdraw from such care at any time.    Notes:

## 2024-04-10 NOTE — ASSESSMENT & PLAN NOTE
Presented to ED with persistent right side weakness and uncoordinated    - out of window for TNK  CTH neg acute  MRI brain noted with acute lacunar infarct to left thalamus   - etiology small vessel  CTA without LVO; 50% right ICA stenosis  TTE without PFO; no LAE    Cont ASA QD for secondary stroke prevention  Work with PCP in controlling vascular risk factors:   - BP management; compliance discussed   - ; cont HI statin for LDL goal <70   - A1c 5.8%   - diet modification   - stop smoking!    Stroke education discussed

## 2024-04-12 NOTE — PROGRESS NOTES
[facility]  Physical Therapy Neurological Rehabilitation Initial Evaluation       Name: Terence Bianchi  Clinic Number: [id number]    Therapy Diagnosis: No diagnosis found.  Physician: Angie Bowers FNP    Physician Orders: {AMB PT KNEE ORDERS:39565} ***  Medical Diagnosis from Referral: ***  Evaluation Date: 4/15/2024  Authorization Period Expiration: 4/10/25  Plan of Care Expiration: ***  Progress Note Due: ***  Visit # / Visits authorized: 1/ 1  FOTO: 1/5    Precautions: {IP WOUND PRECAUTIONS OHS:82071}    Time In: ***  Time Out: ***  Total Billable Time: *** minutes      Subjective     Medical History:   Past Medical History:   Diagnosis Date    BRVO (branch retinal vein occlusion)     OD    Cataract     High cholesterol     Hypertension     Hypertensive retinopathy of both eyes        Surgical History:   Terence Bianchi  has a past surgical history that includes Eye surgery (Right, 2020); Colonoscopy (N/A, 8/26/2020); Cyst Removal (Right); and Robot-assisted laparoscopic repair of inguinal hernia (Right, 4/22/2021).    Medications:   Terence has a current medication list which includes the following prescription(s): amlodipine, aspirin, and atorvastatin, and the following Facility-Administered Medications: electrolyte-s (ph 7.4) and lidocaine (pf) 10 mg/ml (1%).    Allergies:   Review of patient's allergies indicates:   Allergen Reactions    Lisinopril Swelling     Lips swelled        Imaging, {Mri/ctscan/bone scan:70451}: ***    Date of onset: ***  History of current condition - Terence reports: ***          from neuro note 4/10/24  History of Present Illness (HPI):  Terence Bianchi is a right handed 63 y.o. male with a PMHX of HTN, colon polyps, and tobacco use      Patient presents today for hospital follow up. As per EMR he presented to the ED with acute right side weakness that began during the night before admit. MRI brain scan confirmed acute left thalamic stroke. He admits to  "not being very compliant with his BP medications.      Since discharge he has been feeling OK. He still has some weakness to his right side and difficulty writing his name. He is not in outpatient therapy.   His BP medication was changed and is now diligent about taking his medications daily.   He is not currently taking Lipitor as he didn't know the prescription was sent to his pharmacy. He is maintained on aspirin 81 mg daily. He has cut down smoking to 5 cigarettes a day. He is wanting to quit.     Prior Therapy: ***  Social History: *** {LIVES WITH:[address]20}  Falls: ***   DME: {AMB OT NEURO DME:43046} ***   Home Environment: ***   Exercise Routine / History: ***   Family Present at time of Eval: ***   Occupation: ***  Prior Level of Function: ***  Current Level of Function: ***  Driving: ***    Pain:  Current {0-10:56256::"0"}/10, worst {0-10:20507::"0"}/10, best {0-10:20507::"0"}/10   Location: {RIGHT LEFT BILATERAL:11096} {LOCATION ON BODY:58111}  Description: {Pain Description:84544}  Aggravating Factors: {Causes; Pain:54990}  Easing Factors: {Pain (activities that relieve):58917}    Pts goals: ***    Objective     Observation: pleasant and cooperative ***  Speech: ***    Mental status: {pe mental status_general use:565254}  Appearance: {appearance:78016}  Behavior:  {behavior:43616}  Attention Span and Concentration:  {Att/con:63779}    Posture Alignment :{AMB PT VESTIBULAR POSTURE ALIGNMENT:64252}    Dominant hand:  {DOMINANT HAND:31954}     Skin integrity:  {INTACT:80894}    Visual/Auditory: denies changes ***  Tracking:{INTACT:78542}  Saccades: {INTACT:46392}  Acuity:{INTACT:48985}  R/L discrimination: {INTACT:25535}  Visual field: {INTACT:82330}    ROM:   GROSS AROM/PROM  UPPER EXTREMITY  (R) UE: {AMB PT VESTIBULAR ROM:74999}  (L) UE: {AMB PT VESTIBULAR ROM:15787}  LOWER EXTREMITY  (R) LE: {AMB PT VESTIBULAR ROM:25247}  (L) LE: {AMB PT VESTIBULAR ROM:68713}    BP: ***     Lower Extremity Strength  Right LE "  Left LE    Hip flexion:  {AMB PT VESTIBULAR STRENGTH:27943} Hip flexion: {AMB PT VESTIBULAR STRENGTH:94947}   Knee extension: {AMB PT VESTIBULAR STRENGTH:39862} Knee extension: {AMB PT VESTIBULAR STRENGTH:56970}   Knee flexion: {AMB PT VESTIBULAR STRENGTH:62341} Knee flexion: {AMB PT VESTIBULAR STRENGTH:30455}   Ankle dorsiflexion:  {AMB PT VESTIBULAR STRENGTH:05192} Ankle dorsiflexion: {AMB PT VESTIBULAR STRENGTH:05977}   Ankle plantarflexion:  {AMB PT VESTIBULAR STRENGTH:87781} Ankle plantarflexion: {AMB PT VESTIBULAR STRENGTH:04037}   Hip abduction: {AMB PT VESTIBULAR STRENGTH:63846} Hip abduction: {AMB PT VESTIBULAR STRENGTH:51543}   Hip adduction: {AMB PT VESTIBULAR STRENGTH:48904} Hip adduction {AMB PT VESTIBULAR STRENGTH:44414}   Hip extension: {AMB PT VESTIBULAR STRENGTH:82514} Hip extension: {AMB PT VESTIBULAR STRENGTH:29381}     Upper extremity strength:     Right Left   Shoulder Flexion: {AMB PT VESTIBULAR STRENGTH:96089} {AMB PT VESTIBULAR STRENGTH:82408}   Shoulder Abduction: {AMB PT VESTIBULAR STRENGTH:75588} {AMB PT VESTIBULAR STRENGTH:21319}   Shoulder Extension: {AMB PT VESTIBULAR STRENGTH:10670} {AMB PT VESTIBULAR STRENGTH:43935}   Shoulder ER: {AMB PT VESTIBULAR STRENGTH:44000} {AMB PT VESTIBULAR STRENGTH:61292}   Shoulder IR: {AMB PT VESTIBULAR STRENGTH:79531} {AMB PT VESTIBULAR STRENGTH:80534}   Elbow Flexion: {AMB PT VESTIBULAR STRENGTH:24358} {AMB PT VESTIBULAR STRENGTH:85675}   Elbow Extension: {AMB PT VESTIBULAR STRENGTH:00002} {AMB PT VESTIBULAR STRENGTH:13055}   Wrist Extension: {AMB PT VESTIBULAR STRENGTH:68748} {AMB PT VESTIBULAR STRENGTH:18713}   Wrist Flexion: {AMB PT VESTIBULAR STRENGTH:11017} {AMB PT VESTIBULAR STRENGTH:95329}    {AMB PT VESTIBULAR STRENGTH:37034} {AMB PT VESTIBULAR STRENGTH:60209}     Coordination:   Rapid Alternating Movements: ***  Point to Point:    -Finger to Nose: ***   -Heel to Shin: ***    Sensation: ***  Proprioception: ***    Tone/Spasticity:  "***      Functional Mobility   Evaluation    Bed mobility:  {AMB OT NEURO ASSISTANCE:82936}   Supine to sit: {AMB OT NEURO ASSISTANCE:29452}   Sit to supine:  {AMB OT NEURO ASSISTANCE:10863}   Rolling:  {AMB OT NEURO ASSISTANCE:41603}   Transfers to bed: {AMB OT NEURO ASSISTANCE:03482}   Transfers to toilet: {AMB OT NEURO ASSISTANCE:13086}   Sit to stand {AMB OT NEURO ASSISTANCE:40198}   Stand pivot:   {AMB OT NEURO ASSISTANCE:51271}   Car transfers: {AMB OT NEURO ASSISTANCE:27845}   Wheelchair mobility:  {AMB OT NEURO ASSISTANCE:67020}   Floor transfers: {AMB OT NEURO ASSISTANCE:40066}           Evaluation   Single Limb Stance right  lower extremity  ***  (<10 sec = HIGH FALL RISK)   Single Limb Stance Left  lower extremity  ***  (<10 sec = HIGH FALL RISK)   5 times sit to stand *** seconds upper extremity support***   TUG *** seconds AD ***   Self selected walking speed (10MWT) *** m/sec (6m/***s)  AD ***   Mckenna Balance Scale *** AD ***   Functional Gait Assessment  *** AD ***   6 minute walk test ***     Timed up and go score >14 seconds indicates risk for falls in older stroke patients (Echo et al, 2006)    Independent Community Ambulator > 1.4 m/s   Mod I Community Ambulator 1.2-1.4 m/s   SPV/SBA Community Ambulator 0.8-1.2 m/s   Limited Community Ambulator 0.4-0.8 m/s   Household Ambulator < 0.4 m/s       5 x sit<>stand  >12 sec= fall risk for general elderly  >16 sec= fall risk for Parkinson's disease  >10 sec= balance/vestibular dysfunction (<59 y/o)  >14.2 sec= balance/vestibular dysfunction (>59 y/o)  >12 sec= fall risk for CVA      Postural control:  YESSICA SENSORY TESTING:  (P= Pass, F= Fail; note any sway; hold each position for 30")  Condition 1: (firm surface/feet together/eyes open) ***  Condition 2: (firm surface/feet together/eyes closed) ***  Condition 3: (firm surface/feet in tandem/eyes open) ***  Condition 4: (firm surface/feet in tandem/eyes closed) ***  Condition 5: (soft surface/feet " together/eyes open) ***  Condition 6: (soft surface/feet together/eyes closed) ***  Condition 7: (Fakuda step test), measure distance varied from center starting position ***    Postural control:  MCTSIB:   Evaluation    1. Eyes Open/feet together/Firm  *** seconds   2. Eyes Closed/feet together/Firm:  *** seconds   3. Eyes Open/feet together/Foam:   *** seconds   4. Eyes Closed/feet together/Foam:  *** seconds      Gait     Evaluation   AD used:   ***   Assistance  ***   Distance  ***       GAIT DEVIATIONS:   Terence displays the following deviations with ambulation: ***   Impairments contributing to deviations: impaired motor control, ***      - Stairs: {AMB OT NEURO ASSISTANCE:84652}      MIMS Assessment  1. Sitting to Standing: {MIMS SITTING TO STANDIN}  2. Standing Unsupported: {MIMS STANDING UNSUPPORTED:75923}  3. Sitting Unsupported: {MIMS SITTING UNSUPPORTED:26029}  4. Standing to Sitting: {MIMS STANDING TO SITTIN}  5. Pivot Transfer: {MIMS PIVOT TRANSFERS:25627}  6. Standing with Eyes Closed: {MIMS STANDING WITH EYES CLOSED:78291}  7. Standing with Feet Together: {MIMS STANDING WITH FEET TOGETHER:96051}  8. Reaching Forward with Outstretched Arm: {MIMS REACHING FORWARD WITH OUTSTRETCHED ARM:85682}  9. Retrieving Object from Floor: {MIMS RETRIEVING OBJECT FROM FLOOR:69738}  10. Turning to Look Behind: {MIMS TURNING TO LOOK BEHIND:42566}  11. Turning 360 Degrees: {MIMS TURNING 360 DEGREES:77533}  12. Placing Alternate Foot on Step: {MIMS PLACING ALTERNATE FOOT ON STEP:19810}  13. Standing with One Foot in Front: {MIMS STANDING WITH ONE FOOT IN FRONT:15510}  14. Standing on One Foot: {MIMS STANDING ON ONE FOOT:69622}  Total: ***  Maximum: 56  (*** fall risk)    0-20= high fall risk   21-40 moderate fall risk   41-56 low fall risk     Fall risk cut-off scores:   Elderly and History of falls: <51/56   Elderly and No history of falls: <42/56   CVA: <45/56       Functional Gait Assessment:   1. Gait on  level surface =  ***   (3) Normal: less than 5.5 sec, no A.D., no imbalance, normal gait pattern, deviates< 6in   (2) Mild impairment: 7-5.6 sec, uses A.D., mild gait deviations, or deviates 6-10 in   (1) Moderate impairment: > 7 sec, slow speed, imbalance, deviates 10-15 in.   (0) Severe impairment: needs assist, deviates >15 in, reach/touch wall  2. Change in Gait Speed = ***   (3) Normal: smooth change w/o loss of balance or gait deviation, deviates < 6 in, significant difference between speeds   (2) Mild impairment: changes speed, but demonstrates mild gait deviations, deviates 6-10 in, OR no deviations but unable to significantly speed, OR uses A.D.   (1) Moderate impairment: minor changes to speed, OR changes speed w/ significant deviations, deviates 10-15 in, OR  Changes speed , but loses balance & recovers   (0) Severe impairment: cannot change speed, deviates >15 in, or loses balance & needs assist  3. Gait with horizontal head turns  = ***   (3) Normal: no change in gait, deviates <6 in   (2) Mild impairment: slight change in speed, deviates 6-10 in, OR uses A.D.   (1) Moderate impairment: moderate change in speed, deviates 10-15 in   (0) Severe impairment: severe disruption of gait, deviates >15in  4. Gait with vertical head turns = ***   (3) Normal: no change in gait, deviates <6 in   (2) Mild impairment: slight change in speed, deviates 6-10 in OR uses A.D.   (1) Moderate impairment: moderate change in speed, deviates 10-15 in   (0) Severe impairment: severe disruption of gait, deviates >15 in  5. Gait with pivot turns = ***   (3) Normal: performs safely in 3 sec, no LOB   (2) Mild impairment: performs in >3 sec & no LOB, OR turns safely & requires several steps to regain LOB   (1) Moderate impairment: turns slow, OR requires several small steps for balance following turn & stop   (0) Severe impairment: cannot turn safely, needs assist  6. Step over obstacle = ***   (3) Normal: steps over 2 stacked  boxes w/o change in speed or LOB   (2) Mild impairment: able to step over 1 box w/o change in speed or LOB   (1) Moderate impairment: steps over 1 box but must slow down, may require VC   (0) Severe impairment: cannot perform w/o assist  7. Gait with Narrow MIRI = ***   (3) Normal: 10 steps no staggering   (2) Mild impairment: 7-9 steps   (1) Moderate impairment: 4-7 steps   (0) Severe impairment: < 4 steps or cannot perform w/o assist  8. Gait with eyes closed = ***   (3) Normal: < 7 sec, no A.D., no LOB, normal gait pattern, deviates <6 in   (2) Mild impairment: 7.1-9 sec, mild gait deviations, deviates 6-10 in   (1) Moderate impairment: > 9 sec, abnormal pattern, LOB, deviates 10-15 in   (0) Severe impairment: cannot perform w/o assist, LOB, deviates >15in  9. Ambulating Backwards = ***   (3) Normal: no A.D., no LOB, normal gait pattern, deviates <6in   (2) Mild impairment: uses A.D., slower speed, mild gait deviations, deviates 6-10 in   (1) Moderate impairment: slow speed, abnormal gait pattern, LOB, deviates 10-15 in   (0) Severe impairment: severe gait deviations or LOB, deviates >15in  10. Steps = ***   (3) Normal: alternating feet, no rail   (2) Mild Impairment: alternating feet, uses rail   (1) Moderate impairment: step-to, uses rail   (0) Severe impairment: cannot perform safely    Score ***/30     Score:   <22/30 fall risk   <20/30 fall risk in older adults   <18/30 fall risk in Parkinsons       Endurance Deficit: *** ff    PT Evaluation Completed? Yes      CMS Impairment/Limitation/Restriction for FOTO Intake Survey    Therapist reviewed FOTO scores for Terence Bianchi on 4/15/2024.   FOTO documents entered into [employer] - see Media section.    Limitation Score: ***%         TREATMENT   Treatment not performed today secondary to time constraints ***    Total Treatment time separate from Evaluation: *** minutes      Patient Education and Home Exercises     Education provided:   - ***    Written Home Exercises  "Provided: {Blank single:33782::"yes","Patient instructed to cont prior HEP"}.  Exercises were reviewed and Terence was able to demonstrate them prior to the end of the session.  Terence demonstrated {Desc; good/fair/poor:48983} understanding of the education provided.     See EMR under {Blank single:17807::"Media","Patient Instructions"} for exercises provided {Blank single:11083::"4/15/2024","prior visit"}.    Assessment   Terence is a 63 y.o. male referred to outpatient Physical Therapy with a medical diagnosis of ***. Pt presents to PT with the following impairments leading to his/her functional decline: ***.     Pt prognosis is {REHAB PROGNOSIS OHS:54950}.   Pt will benefit from skilled outpatient Physical Therapy to address the deficits stated above and in the chart below, provide pt/family education, and to maximize pt's level of independence.     Plan of care discussed with patient: Yes  Pt's spiritual, cultural and educational needs considered and patient is agreeable to the plan of care and goals as stated below:     Anticipated Barriers for therapy: ***    Medical Necessity is demonstrated by the following  History  Co-morbidities and personal factors that may impact the plan of care [] LOW: no personal factors / comorbidiies  [] MODERATE: 1-2 personal factors / comorbidiies  [] HIGH: 3+ personal factors / comorbidiies    Moderate / High Support Documentation: ***     Examination  Body Structures and Functions, activity limitations and participation restrictions that may impact the plan of care [] LOW: addressing 1-2 elements  [] MODERATE: 3+ elements  [] HIGH: 3+ elements (please support below)    Moderate / High Support Documentation: ***     Clinical Presentation [] LOW: stable  [] MODERATE: Evolving  [] HIGH: Unstable     Decision Making/ Complexity Score: {Desc; low/moderate/high:091082}           Goals:      Short Term Goals: *** weeks Date last assessed:  Status:    1. Patient will be provided with an HEP " for ***, *** and ***.  4/15/2024   New   2.  Patient will demonstrate improve endurance and activity tolerance as evidenced by ability to perform Nu-step x 15 minutes without rests breaks. 4/15/2024  New         Long Term Goals: ***weeks  Date last assessed: Status:    1. Patient will be independent and compliant with updated HEP. 4/15/2024   New   2.  Patient will increase his   gait speed as assessed by the timed 10MWT from *** m/s to *** m/s to increase his safety and (I) with gait at a community level. (MDC for CVA= 0.14 m/s)    4/15/2024   New     3. The patient will demonstrate improved efficiency with functional mobility and decreased risk for falls as evidenced by an increase in his score on the Timed up and Go from *** to ***. (Minimal detectable change in chronic stroke = 2.9 seconds)  4/15/2024 New   4.Patient will improve his FOTO score from ***%  to at least ***% for improved self perception of functional mobility.(score 0-100, high score indicates greater level of function) 4/15/2024 New   5.  Patient will demonstrated improved score on the Mckenna Balance Scale from *** to ***/56 to demonstrate improved functional mobility, balance and decreased risk for falls. (MDC for acute CVA= 7 points; MDC for chronic CVA =5 points)   4/15/2024   New    6. The patient will improve his score on the  Functional Gait Assessment (FGA) from ***/30 to ***/30, indicating improved safety and (I) with dynamic,comunity level gait. (Minimal detectable change for stroke= 4.2 points)  4/15/2024   New   7. Patient will increase his  distance achieved on the 6MWT from *** feet to *** feet to demonstrate improved endurance and efficiency with community level gait. (MDC for CVA = 112 feet) 4/15/2024   New   8.  *** 4/15/2024   New         Plan   Plan of care Certification: 4/15/2024 to ***.    Outpatient Physical Therapy {NUMBERS 1-5:24101} times weekly for {NUMBER 1-16:58748} weeks to include the following interventions: {TX  PLAN:32210}.     Dominique Roy, PT

## 2024-04-15 ENCOUNTER — CLINICAL SUPPORT (OUTPATIENT)
Dept: REHABILITATION | Facility: HOSPITAL | Age: 64
End: 2024-04-15
Attending: NURSE PRACTITIONER
Payer: MEDICARE

## 2024-04-15 DIAGNOSIS — I69.398 IMPAIRED BALANCE AS LATE EFFECT OF CEREBROVASCULAR ACCIDENT: Primary | ICD-10-CM

## 2024-04-15 DIAGNOSIS — I63.81 LEFT SIDED LACUNAR STROKE: ICD-10-CM

## 2024-04-15 DIAGNOSIS — R26.89 IMPAIRED BALANCE AS LATE EFFECT OF CEREBROVASCULAR ACCIDENT: Primary | ICD-10-CM

## 2024-04-15 DIAGNOSIS — R53.1 RIGHT SIDED WEAKNESS: ICD-10-CM

## 2024-04-15 PROCEDURE — 97161 PT EVAL LOW COMPLEX 20 MIN: CPT | Mod: PO

## 2024-04-15 NOTE — PLAN OF CARE
[facility]  Physical Therapy Neurological Rehabilitation Initial Evaluation       Name: Terence Bianchi  Clinic Number: [id number]    Therapy Diagnosis:   Encounter Diagnoses   Name Primary?    Right sided weakness     Left sided lacunar stroke     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 Yes     Physician: Angie Bowers, JAZP    Physician Orders: PT Eval and Treat  Medical Diagnosis from Referral:   R53.1 (ICD-10-CM) - Right sided weakness   I63.81 (ICD-10-CM) - Left sided lacunar stroke     Evaluation Date: 4/15/2024  Authorization Period Expiration: 4/10/25  Plan of Care Expiration: 5/13/24  Progress Note Due: 4/29/24  Visit # / Visits authorized: 1/ 1  FOTO: 1/5     Precautions: Standard and Fall    Time In: 4:02 pm  Time Out: 4:45 pm  Total Billable Time: 43 minutes      Subjective     Medical History:   Past Medical History:   Diagnosis Date    BRVO (branch retinal vein occlusion)     OD    Cataract     High cholesterol     Hypertension     Hypertensive retinopathy of both eyes        Surgical History:   Terence Bianchi  has a past surgical history that includes Eye surgery (Right, 2020); Colonoscopy (N/A, 8/26/2020); Cyst Removal (Right); and Robot-assisted laparoscopic repair of inguinal hernia (Right, 4/22/2021).    Medications:   Terence has a current medication list which includes the following prescription(s): amlodipine, aspirin, and atorvastatin, and the following Facility-Administered Medications: electrolyte-s (ph 7.4) and lidocaine (pf) 10 mg/ml (1%).    Allergies:   Review of patient's allergies indicates:   Allergen Reactions    Lisinopril Swelling     Lips swelled        Imaging:     CTA Head and Neck 3/21/24:   Impression:     1. Approximately 50% stenosis right internal carotid artery intracranial level.    MRI Brain Without Contrast 3/21/24:   Impression:     1. Acute lacunar infarct of the left thalamus.  2. Epic message was sent to Dr. Manriquez with  "hospital medicine at 18:08 hours on 03/21/2024.    Date of onset: 3/21/24     History of current condition - Terence reports: "Been doing pretty good but still have some weakness." Says sometimes it feels like he cannot pick his right foot up. Reports he did not go to the hospital right away when his stroke symptoms began.      Per neuro note 4/10/24  History of Present Illness (HPI):  Terence Bianchi is a right handed 63 y.o. male with a PMHX of HTN, colon polyps, and tobacco use      Patient presents today for hospital follow up. As per EMR he presented to the ED with acute right side weakness that began during the night before admit. MRI brain scan confirmed acute left thalamic stroke. He admits to not being very compliant with his BP medications.      Since discharge he has been feeling OK. He still has some weakness to his right side and difficulty writing his name. He is not in outpatient therapy.   His BP medication was changed and is now diligent about taking his medications daily.   He is not currently taking Lipitor as he didn't know the prescription was sent to his pharmacy. He is maintained on aspirin 81 mg daily. He has cut down smoking to 5 cigarettes a day. He is wanting to quit.     Prior Therapy: None   Social History: Lives with significant other and adopted daughter (has special needs) in single- story home with no steps to enter. Reports house is handicapped accessible with hand rails near commode and shower.   Falls: None - reports tripping over items more frequently since his stroke but able to catch himself   DME: None  Exercise Routine / History: Takes daughter bike riding but does not have a routine    Family Present at time of Eval: None    Occupation: Retired   Prior Level of Function: Independent   Current Level of Function: Independent   Driving: Yes - "I ain't ever quit. I'm aware of my limitations."     Pain:  Current: 0/10   Location: NA    Pts goals: "Get my strength and " "coordination back in my legs."     Objective     Observation: pleasant and cooperative.   Mental status: alert, oriented to person, place, and time. Affect appropriate to mood.  Speech: Reports slight changes since stroke - requested orders for Speech Therapy. Pt in agreement.    Appearance: Casually dressed  Behavior:  calm, cooperative, and adequate rapport can be established  Attention Span and Concentration:  Normal    Posture Alignment: slouched posture with hips forward in the chair    Dominant hand: Right     Skin integrity:  Intact    Visual/Auditory: Not formally assessed. Denies changes, does not wear glasses.     BP: 107/70 mmHg   HR: 66 bpm    Lower Extremity Strength  Right LE  Left LE    Hip flexion:  4-/5 Hip flexion: 4/5   Knee extension: 5/5 Knee extension: 5/5   Knee flexion: 5/5 Knee flexion: 5/5   Ankle dorsiflexion:  4+/5 Ankle dorsiflexion: 5/5   Ankle plantarflexion:  4/5 Ankle plantarflexion: 5/5   Hip abduction: 4/5 Hip abduction: 5/5   Hip extension: 3+/5 Hip extension: 3+/5     Coordination:   Rapid Alternating Movements:   Dysdiadokinesia: Impaired with decreased control as speed increases  Alternating Toe Taps: Impaired, decreased speed     Point to Point:    Finger to Nose: Impaired on right with slight dysmetria    Sensation: Impaired right palm of hand and dorsum of foot. Pt also reports occasional numbness in right hand and right foot.    Proprioception: Not assessed.     Tone/Spasticity: Not assessed. No apparent deficits.       Functional Mobility   Evaluation    Bed mobility:  Independent   Supine to sit: Independent   Sit to supine: Independent   Rolling: Independent   Transfers to bed: Independent   Transfers to toilet: Independent   Sit to stand Independent   Stand pivot:  Independent   Car transfers: Independent   Wheelchair mobility:  NA   Floor transfers: Not Assessed           Evaluation   Single Limb Stance right  lower extremity  1 sec  (<10 sec = HIGH FALL RISK)   Single " 72 Limb Stance Left  lower extremity  1 sec  (<10 sec = HIGH FALL RISK)   5 times sit to stand 9 seconds, no upper extremity support    TUG 8.19 seconds, no assistive device   Self selected walking speed (10MWT) 1.24 m/sec (6m/4.82s) , No Assistive Device   Mckenna Balance Scale Not assessed   Functional Gait Assessment  23/30 No Assistive Device    6 minute walk test Not assessed      Timed up and go score >14 seconds indicates risk for falls in older stroke patients (Echo et al, 2006)    Independent Community Ambulator > 1.4 m/s   Mod I Community Ambulator 1.2-1.4 m/s   SPV/SBA Community Ambulator 0.8-1.2 m/s   Limited Community Ambulator 0.4-0.8 m/s   Household Ambulator < 0.4 m/s       5 x sit<>stand  >12 sec= fall risk for general elderly  >16 sec= fall risk for Parkinson's disease  >10 sec= balance/vestibular dysfunction (<61 y/o)  >14.2 sec= balance/vestibular dysfunction (>61 y/o)  >12 sec= fall risk for CVA      Postural control:  MCTSIB:   Evaluation    1. Eyes Open/feet together/Firm  30 seconds   2. Eyes Closed/feet together/Firm:  30 seconds   3. Eyes Open/feet together/Foam:   30 seconds   4. Eyes Closed/feet together/Foam:  30 seconds - increased sway after 10 sec     Gait     Evaluation   AD used:   None   Assistance   Supervision    Distance  Mod I Community Ambulator       GAIT DEVIATIONS:   Terence displays the following deviations with ambulation: Wide base of support   Impairments contributing to deviations: Impaired motor control     - Stairs: Independent       Functional Gait Assessment:   1. Gait on level surface =  3   (3) Normal: less than 5.5 sec, no A.D., no imbalance, normal gait pattern, deviates< 6in   (2) Mild impairment: 7-5.6 sec, uses A.D., mild gait deviations, or deviates 6-10 in   (1) Moderate impairment: > 7 sec, slow speed, imbalance, deviates 10-15 in.   (0) Severe impairment: needs assist, deviates >15 in, reach/touch wall  2. Change in Gait Speed = 3   (3) Normal: smooth  change w/o loss of balance or gait deviation, deviates < 6 in, significant difference between speeds   (2) Mild impairment: changes speed, but demonstrates mild gait deviations, deviates 6-10 in, OR no deviations but unable to significantly speed, OR uses A.D.   (1) Moderate impairment: minor changes to speed, OR changes speed w/ significant deviations, deviates 10-15 in, OR  Changes speed , but loses balance & recovers   (0) Severe impairment: cannot change speed, deviates >15 in, or loses balance & needs assist  3. Gait with horizontal head turns  = 1   (3) Normal: no change in gait, deviates <6 in   (2) Mild impairment: slight change in speed, deviates 6-10 in, OR uses A.D.   (1) Moderate impairment: moderate change in speed, deviates 10-15 in   (0) Severe impairment: severe disruption of gait, deviates >15in  4. Gait with vertical head turns = 3   (3) Normal: no change in gait, deviates <6 in   (2) Mild impairment: slight change in speed, deviates 6-10 in OR uses A.D.   (1) Moderate impairment: moderate change in speed, deviates 10-15 in   (0) Severe impairment: severe disruption of gait, deviates >15 in  5. Gait with pivot turns = 3   (3) Normal: performs safely in 3 sec, no LOB   (2) Mild impairment: performs in >3 sec & no LOB, OR turns safely & requires several steps to regain LOB   (1) Moderate impairment: turns slow, OR requires several small steps for balance following turn & stop   (0) Severe impairment: cannot turn safely, needs assist  6. Step over obstacle = 3   (3) Normal: steps over 2 stacked boxes w/o change in speed or LOB   (2) Mild impairment: able to step over 1 box w/o change in speed or LOB   (1) Moderate impairment: steps over 1 box but must slow down, may require VC   (0) Severe impairment: cannot perform w/o assist  7. Gait with Narrow MIRI = 1   (3) Normal: 10 steps no staggering   (2) Mild impairment: 7-9 steps   (1) Moderate impairment: 4-7 steps   (0) Severe impairment: < 4 steps or  cannot perform w/o assist  8. Gait with eyes closed = 2   (3) Normal: < 7 sec, no A.D., no LOB, normal gait pattern, deviates <6 in   (2) Mild impairment: 7.1-9 sec, mild gait deviations, deviates 6-10 in   (1) Moderate impairment: > 9 sec, abnormal pattern, LOB, deviates 10-15 in   (0) Severe impairment: cannot perform w/o assist, LOB, deviates >15in  9. Ambulating Backwards = 1   (3) Normal: no A.D., no LOB, normal gait pattern, deviates <6in   (2) Mild impairment: uses A.D., slower speed, mild gait deviations, deviates 6-10 in   (1) Moderate impairment: slow speed, abnormal gait pattern, LOB, deviates 10-15 in   (0) Severe impairment: severe gait deviations or LOB, deviates >15in  10. Steps = 3   (3) Normal: alternating feet, no rail   (2) Mild Impairment: alternating feet, uses rail   (1) Moderate impairment: step-to, uses rail   (0) Severe impairment: cannot perform safely    Score 23/30     Score:   <22/30 fall risk   <20/30 fall risk in older adults   <18/30 fall risk in Parkinsons       PT Evaluation Completed? Yes      CMS Impairment/Limitation/Restriction for FOTO Stroke Lower Extremity Intake Survey    Therapist reviewed FOTO scores for Terence Bianchi on 4/15/2024.   FOTO documents entered into [employer] - see Media section.    Limitation Score: 67%         TREATMENT   Treatment not performed today secondary to time spent performing patient interview, objective assessments and patient education.     Patient Education and Home Exercises     Education provided:   - Educated on role of PT in outpatient setting, PT plan of care and goals. Reports understanding.   - Referred to [facility] program and provided with brochure.     Written Home Exercises Provided: Provide in upcoming visit.  Assessment   Terence is a 63 y.o. male referred to outpatient Physical Therapy with a medical diagnosis of R53.1 (ICD-10-CM) - Right sided weakness and I63.81 (ICD-10-CM) - Left sided lacunar stroke. Pt presents to PT with slight  deficits in strength on the right side. He is at an increased risk of falls based on his single leg stance time of 1 sec on right and left. Additionally, he is borderline at an increased risk of falls based on his Functional Gait Assessment Score. PT and pt discussed referral to speech therapy for further assessment of cognition and speech due to reports of slight changes since stroke.     Pt prognosis is Good.   Pt will benefit from skilled outpatient Physical Therapy to address the deficits stated above and in the chart below, provide pt/family education, and to maximize pt's level of independence.     Plan of care discussed with patient: Yes  Pt's spiritual, cultural and educational needs considered and patient is agreeable to the plan of care and goals as stated below:     Anticipated Barriers for therapy: None at this time    Medical Necessity is demonstrated by the following  History  Co-morbidities and personal factors that may impact the plan of care [] LOW: no personal factors / comorbidiies  [] MODERATE: 1-2 personal factors / comorbidiies  [x] HIGH: 3+ personal factors / comorbidiies    Moderate / High Support Documentation:     BRVO (branch retinal vein occlusion)    OD   Cataract    High cholesterol    Hypertension    Hypertensive retinopathy of both eyes         Examination  Body Structures and Functions, activity limitations and participation restrictions that may impact the plan of care [] LOW: addressing 1-2 elements  [] MODERATE: 3+ elements  [x] HIGH: 3+ elements (please support below)    Moderate / High Support Documentation:    Strength   Sensation   Coordination  Balance   Ambulation   Bed Mobility   Transfers      Clinical Presentation [x] LOW: stable  [] MODERATE: Evolving  [] HIGH: Unstable     Decision Making/ Complexity Score: low           Goals:    Short Term Goals: 2 weeks Date last assessed:  Status:    1. Patient will be provided with an HEP for balance and strength.   4/15/2024   New    2.  Patient will demonstrate improved endurance and activity tolerance as evidenced by ability to perform Nu-step x 15 minutes without rests breaks. 4/15/2024  New         Long Term Goals: 4 weeks  Date last assessed: Status:    1. Patient will be independent and compliant with updated HEP. 4/15/2024   New   2.  Patient will increase his   gait speed as assessed by the timed 10MWT from 1.24 m/s to 1.38 m/s to increase his safety and (I) with gait at a community level. (MDC for CVA= 0.14 m/s)    4/15/2024   New     3.Patient will improve his FOTO score from 67%  to at least 80% for improved self perception of functional mobility.(score 0-100, high score indicates greater level of function) 4/15/2024 New   4. The patient will improve his score on the  Functional Gait Assessment (FGA) from 23/30 to 28/30, indicating improved safety and (I) with dynamic,comunity level gait. (Minimal detectable change for stroke= 4.2 points)  4/15/2024   New   5. Patient will improve bilateral single limb stance time from 1 second to > 10 seconds to demonstrate a reduction in fall risk.  4/15/2024   New          Plan   Plan of care Certification: 4/15/2024 to 5/13/24.    Outpatient Physical Therapy 2 times weekly for 4 weeks to include the following interventions: Gait Training, Manual Therapy, Moist Heat/ Ice, Neuromuscular Re-ed, Patient Education, Self Care, Therapeutic Activities, and Therapeutic Exercise.     Jessica Araiza, PT

## 2024-04-15 NOTE — PROGRESS NOTES
Thank you for your referral. Please see Treatment Tab for Physical Therapy Initial Evaluation and Plan of Care. Thanks!

## 2024-04-16 ENCOUNTER — TELEPHONE (OUTPATIENT)
Dept: NEUROLOGY | Facility: CLINIC | Age: 64
End: 2024-04-16
Payer: MEDICARE

## 2024-04-16 DIAGNOSIS — I63.81 LEFT SIDED LACUNAR STROKE: Primary | ICD-10-CM

## 2024-04-16 NOTE — TELEPHONE ENCOUNTER
----- Message from Jessica Araiza PT sent at 4/15/2024  4:52 PM CDT -----  Regarding: Speech Therapy Referral  Good Afternoon,     I evaluated this patient for physical therapy and believe he would also benefit from a speech therapy evaluation. If in agreement, will you please put the order in?    Thank You,   Jessica Araiza, PT, DPT

## 2024-04-19 ENCOUNTER — CLINICAL SUPPORT (OUTPATIENT)
Dept: REHABILITATION | Facility: HOSPITAL | Age: 64
End: 2024-04-19
Attending: NURSE PRACTITIONER
Payer: MEDICARE

## 2024-04-19 DIAGNOSIS — R27.8 COORDINATION IMPAIRMENT: Primary | ICD-10-CM

## 2024-04-19 DIAGNOSIS — R53.1 RIGHT SIDED WEAKNESS: ICD-10-CM

## 2024-04-19 DIAGNOSIS — I63.81 LEFT SIDED LACUNAR STROKE: ICD-10-CM

## 2024-04-19 PROCEDURE — 97166 OT EVAL MOD COMPLEX 45 MIN: CPT | Mod: PO

## 2024-04-19 NOTE — PROGRESS NOTES
[facility]   Occupational Therapy Initial Neurological Evaluation       Terence Bianchi   MRN: [id number]     OT eval complete. Please see attached POC for details. Thank you for consult!    ISIDRO Edouard LOTR   4/19/2024

## 2024-04-22 ENCOUNTER — CLINICAL SUPPORT (OUTPATIENT)
Dept: REHABILITATION | Facility: HOSPITAL | Age: 64
End: 2024-04-22
Attending: NURSE PRACTITIONER
Payer: MEDICARE

## 2024-04-22 DIAGNOSIS — I63.81 LEFT SIDED LACUNAR STROKE: ICD-10-CM

## 2024-04-22 DIAGNOSIS — R41.841 COGNITIVE COMMUNICATION DEFICIT: Primary | ICD-10-CM

## 2024-04-22 PROCEDURE — 96125 COGNITIVE TEST BY HC PRO: CPT | Mod: PN

## 2024-04-22 NOTE — PLAN OF CARE
"[facility]  Speech Therapy Evaluation -Neurological Rehabilitation    Date: 4/22/2024     Name: Terence Bianchi   MRN: [id number]    Therapy Diagnosis:   Encounter Diagnoses   Name Primary?    Left sided lacunar stroke     Cognitive communication deficit Yes    Physician: Angie Bowers FNP  Physician Orders: Ambulatory Referral to Speech Therapy   Medical Diagnosis:  Left sided lacunar stroke [I63.81]     Visit # / Visits Authorized:  1 / 1   Date of Evaluation:  4/22/2024   Insurance Authorization Period: 4/16/2024 to 4/16/2024  Plan of Care Certification:    4/22/2024 to 7/15/2024      Time In:1301   Time Out: 1345   Total time: 44    Procedure   Cognitive Communication Evaluation including scoring and interpretation (Total time: 75 minutes)     Precautions: Standard and Status post left lacunar stroke  Subjective   Date of Onset: March 21, 2024  History of Current Condition:  Terence Bianchi is a 63 y.o. male who presents to [facility] Outpatient Speech Therapy for evaluation secondary to Left sided lacunar stroke [I63.81] . Patient was referred to therapy by Angie Bowers FNP, which is the patient's neurologist. Patient with history of Acute lacunar infarct of the left thalamus, 3/21/2024. Patient reports some cognitive "limitations". Endorses difficulty with immediate memory recall and attention. Endorses difficulty with word-finding difficulties and thought organization. Endorses mild difficulties with speech, noting getting "stuck" on words.    Past Medical History: Terence Bianchi  has a past medical history of BRVO (branch retinal vein occlusion), Cataract, High cholesterol, Hypertension, and Hypertensive retinopathy of both eyes.  Terence Bianchi  has a past surgical history that includes Eye surgery (Right, 2020); Colonoscopy (N/A, 8/26/2020); Cyst Removal (Right); and Robot-assisted laparoscopic repair of inguinal hernia (Right, 4/22/2021).  Medical Hx and " Allergies: Terence has a current medication list which includes the following prescription(s): amlodipine, aspirin, and atorvastatin, and the following Facility-Administered Medications: electrolyte-s (ph 7.4) and lidocaine (pf) 10 mg/ml (1%).   Review of patient's allergies indicates:   Allergen Reactions    Lisinopril Swelling     Lips swelled     Prior Therapy:  Denies speech therapy services.   Social History:  Patient lives with wife and daughter, Janine who is 15 years-with Autism lives in [facility].  Patient is currently driving.  Occupation: Retired; currently renovating   Prior Level of Function: Within functional/normal limits   Current Level of Function: Mild cognitive communication deficits  Pain Scale: no pain indicated throughout session  Patient's Therapy Goals:  Improve memory difficulties   Objective   Formal Assessment:  The Repeatable Battery for the Assessment of Neuropsychological Status (RBANS) Version B was administered to measure the patient's attention, language, visuospatial/constructional abilities, and immediate and delayed memory. The results are outlined below:    Domain Subtest Total Score Index Score   Immediate Memory List Learning 20   83    Story Memory 16    Visuospatial/  Constructional Figure Copy 18   92    Line Orientation 15    Language Picture Naming 10   101    Semantic Fluency 22    Attention Digit Span 9   79    Coding 31      Delayed Memory List Recall 4     100    List Recognition 19     Story Recall 8     Figure Recall 15        Total Scale   87     Percentile   19     Descriptor   Low average   Interpretation:  Scaled score: mean of 10, standard deviation of 3. Therefore, 16+ = Very Superior; 14-15 = Superior; 12-13 = High Average; 8-11 = Average; 6-7 = Low Average; 4-5 = Borderline; 3 and below = Extremely Low    Index score: mean of 100, standard deviation of 15. Therefore, 130+ = Very Superior; 120-129 = Superior; 110-119 = High average;  = Average; 80-89 = Low  Average; 70-79 = Borderline; 69 and below = Extremely Low. Apparently the most reliable score; factor in education level.    Immediate Memory Score: Recalling information following immediate presentation is assessed through the List Learning and Story Memory subtests. In the List Learning subtest, the patient is given 10 words to remember. This list is presented four times overall. In this subtest, the patient did not demonstrate learning over the 4 trials. The patient recalled 4 items on trial one, 5 items on trial two, 6 items on trial three, and 5 items in trial 4. In the Story Memory subtest, the patient recalled 5 details on the first presentation and 11 details on the second presentation. Results of this domain indicate Low average performance.  Visuospatial score: Perceiving spatial relations and constructing a spatially accurate copy of a drawing is assessed through the Figure Copy and Line Orientation subtests. The Figure Copy subtest asks the patient to copy a complex line drawing. The patient missing details of horizontal line in copy. The Line Orientation subtest the presents the patient with 12 line displays and asks the patient to match two given lines at the bottom to the display at the top.  The patient correctly identified 15/20. Results of this domain indicate Average performance.  Language score: Naming common items and retrieving learned material is assessed through the Picture Naming and Semantic Fluency subtests. The Picture Naming subtest asks the patient to name 10 line drawings. The patient was able to accurately name 10/10 items. The Semantic Fluency subtest asks the patient to name as many animals as he can in 60 seconds. The patient was able to name 18 animals. These results indicate Average performance.   Attention score: Attending to, holding and manipulating information presented visually and orally in working memory is assessed with use of the Digit Span and Coding subtests. The Digit  Span subtest asks the patient to repeat progressively lengthening strings of numbers. The Coding subtest asks the patient to alternate attention between a key the given work and then to decode symbols to numbers. The patients results on these subtest indicate Borderline  performance.  Delayed Memory score: Anterograde memory capacity is assessed through the List Recall, List Recognition, Story Recall, and Figure Recall subtests. The List Recall subtest asks the patient to recall items from the list presented at the beginning of the test. The patient was able to recall 4/10 items. The List Recognition subtest has the patient recall whether a word was or was not in the original list. The patient accurately identified whether a word was or was not on the list in 19 of 20 items. On the Story Recall subtest, the patient was able to recall 8 details. Finally, on the Figure Recall, the patient recalled 15 details. Results of this domain indicate Average performance.    Treatment   Total Treatment Time Separate from Evaluation: not applicable   No treatment performed secondary to time to complete evaluation.     Education provided:   -role of Speech Therapy, goals/plan of care, scheduling/cancellations, insurance limitations with patient  -Additional Education provided:   Memory strategies  Attention Strategies    Patient and/or family members expressed understanding.     Home Program: Not yet established   Assessment     Terence presents to [facility] status post medical diagnosis of  Left sided lacunar stroke [I63.81] .     Interpretation of objective assessment:   Overall, according to the RBANS research, total Scale index is a good indicator of general cognitive functioning. The patient presents with a mild cognitive communication disorder charaterized by deficits in moderate deficits in attention, and mild deficits in memory recall. Patient observed in structured conversation with difficulties noted in  thought organization and word fluency.     Demonstrates impairments including limitations as described in the problem list.     Positive prognostic factors: Motivation   Negative prognostic factors: None  Barriers to therapy: No barriers to therapy identified.     Patient's spiritual, cultural, and educational needs considered and patient agreeable to plan of care and goals.    Patient will benefit from skilled therapy.    Rehab Potential: good    Short Term Goals: (6 weeks) Current Progress:   Patient will complete alternating attention tasks in quiet environment with 90% accuracy independently.     Progressing/ Not Met 4/22/2024   Established this date   2. Patient will complete divided attention tasks in quiet environment with 90% accuracy independently.     Progressing/ Not Met 4/22/2024   Established this date   3. Patient will complete a simple written task to increase verbal attention and memory (I.e. sample bill paying activity, recipe, or multiple choice comprehension questions to 1 paragraphs) with 80% accuracy.    Progressing/ Not Met 4/22/2024   Established this date    4. Patient will participate in Attentive Reading and Constrained Summarization with an adequate summary including no more than 5 general words, extraneous information or pronouns to improve thought organization, verbal fluency, and efficiency in conversation.     Progressing/ Not Met 4/22/2024   Established this date    5. Patient will complete high level problem solving tasks with 90% accuracy independently.     Progressing/ Not Met 4/22/2024   Established this date    6. Patient will utilize spaced retrieval training (starting at 15 seconds) with recall of 5/5 units of information at 16 minutes with use of a timer independently.       Progressing/ Not Met 4/22/2024   Established this date    6. Patient will recall 4/4 memory strategies independently across 3 consecutive sessions.      Progressing/ Not Met 4/22/2024   Established this  date        Long Term Goals: (8 weeks) Current Progress:   Pt will improve  attention skills to effectively attend to and communicate in simple and complex daily living tasks in functional living environment.     Established this date     He will use appropriate memory strategies to schedule and recall weekly activities, express needs and recall names to maintain safety and participate socially in functional living environment.        Established this date     He  will develop functional cognitive-linguistic based skills and utilize compensatory strategies to communicate wants and needs effectively to different conversational partners, maintain safety, and participate socially in functional living environment.        Established this date         Plan     Recommended Treatment Plan:  Patient will participate in the [facility] rehabilitation program for speech therapy 2 times per week for 2 weeks to address his Communication and Cognition deficits, to educate patient and their family, and to participate in a home exercise program.      Therapist's Name:   Chantel Cohn M.S., CF-SLP  Speech-Language Pathologist Resident  4/22/2024

## 2024-04-23 ENCOUNTER — TELEPHONE (OUTPATIENT)
Dept: REHABILITATION | Facility: HOSPITAL | Age: 64
End: 2024-04-23
Payer: MEDICARE

## 2024-04-23 NOTE — TELEPHONE ENCOUNTER
Spoke with patient regarding missed therapy session today. Patient reports he forgot about session since he was here yesterday. Confirmed appointment on 4/25 at 9:30 am.

## 2024-04-26 ENCOUNTER — CLINICAL SUPPORT (OUTPATIENT)
Dept: REHABILITATION | Facility: HOSPITAL | Age: 64
End: 2024-04-26
Payer: MEDICARE

## 2024-04-26 DIAGNOSIS — R41.841 COGNITIVE COMMUNICATION DEFICIT: Primary | ICD-10-CM

## 2024-04-26 DIAGNOSIS — R26.89 IMPAIRED BALANCE AS LATE EFFECT OF CEREBROVASCULAR ACCIDENT: Primary | ICD-10-CM

## 2024-04-26 DIAGNOSIS — I69.398 IMPAIRED BALANCE AS LATE EFFECT OF CEREBROVASCULAR ACCIDENT: Primary | ICD-10-CM

## 2024-04-26 PROCEDURE — 97129 THER IVNTJ 1ST 15 MIN: CPT | Mod: PN

## 2024-04-26 PROCEDURE — 97130 THER IVNTJ EA ADDL 15 MIN: CPT | Mod: PN

## 2024-04-26 PROCEDURE — 97110 THERAPEUTIC EXERCISES: CPT | Mod: PO

## 2024-04-26 NOTE — PROGRESS NOTES
[facility]  Speech Therapy Treatment Note- Neurological Rehabilitation  Date: 4/26/2024     Name: Terence Bianchi   MRN: [id number]   Therapy Diagnosis:   Encounter Diagnosis   Name Primary?    Cognitive communication deficit Yes   Physician: Angie Bowers FNP  Physician Orders: Ambulatory Referral to Speech Therapy   Medical Diagnosis: Left sided lacunar stroke [I63.81]     Visit #/ Visits Authorized: 1/ 12  Date of Evaluation:  4/22/2024  Insurance Authorization Period: 4/26/2024 to 12/31/2024  Plan of Care Expiration Date:    7/15/2024  Extended Plan of Care:  NA   Progress Note: 5/24/2024     Time In:  0803  Time Out:  0845  Total Billable Time: 42    Precautions: Standard  Subjective:   Patient reports: He forgot his physical therapy appointment the other day.    He was compliant to home exercise program.   Response to previous treatment: good  Pain Scale: no pain indicated throughout session  Objective:   TIMED  Procedure Min.   Cognitive Therapeutic Interventions, first 15 minutes CPT 71747  15   Cognitive Therapeutic Interventions, each additional 15 minutes CPT 82775  27           Short Term Goals: (6 weeks) Current Progress:   Patient will complete alternating attention tasks in quiet environment with 90% accuracy independently.     Progressing/ Not Met 4/26/2024   Patient completed the Find alternating symbols task on Constant Therapy Level 5 with 89% accuracy independently.   2. Patient will complete divided attention tasks in quiet environment with 90% accuracy independently      Progressing/ Not Met 4/26/2024    Introduced, Not formally addressed this session.      3. Patient will complete a simple written task to increase verbal attention and memory (I.e. sample bill paying activity, recipe, or multiple choice comprehension questions to 1 paragraphs) with 80% accuracy.      Progressing/ Not Met 4/26/2024   Introduced, Not formally addressed this session.    4. Patient will  participate in Attentive Reading and Constrained Summarization with an adequate summary including no more than 5 general words, extraneous information or pronouns to improve thought organization, verbal fluency, and efficiency in conversation.      Progressing/ Not Met 2024   Introduced, Not formally addressed this session.    5. Patient will complete high level problem solving tasks with 90% accuracy independently.      Progressing/ Not Met 2024   Introduced, Not formally addressed this session.    6. Patient will utilize spaced retrieval training (starting at 15 seconds) with recall of 5/5 units of information at 16 minutes with use of a timer independently.        Progressing/ Not Met 2024   Introduced, Not formally addressed this session.    6. Patient will recall 4/4 memory strategies independently across 3 consecutive sessions.       Progressing/ Not Met 2024   Introduced, Not formally addressed this session.      Majority of the session was spent in structured conversation and education on cognitive-communication deficits secondary to stroke, review of plan of care, education of strategies, the role of external and internal factors on cognitive fatigue and increased cognitive load. Education on Spoon Theory was provided to discuss implementation of sensory/cognitive breaks throughout her day.  We spoke on the importance of setting intentional breaks to begin implementing throughout the day to reduce overall fatigue or cognitive load. External and internal memory strategies were discussed to improve recall of important information (e.g., daily tasks, important dates, appointments, etc.) in community and natural living environment.    Patient Education/Response:   Patient educated regarding the followin. Reviewed initial evaluation results and POC  2. Neuropsychological Functions triangle- cognitive processing   3. Cognitive load/fatigue- cognitive breaks to improve overall cognitive  fatigue    Home program established: Program modified based on patient progress  Patient verbalized understanding to all above education provided.     See Electronic Medical Record under Patient Instructions for exercises provided throughout therapy.  Assessment:   Terence participated well today in today's session which focused on memory, sustained attention, alternating attention, divided attention, education, and memory strategies . Today strengths were noted in self-awareness to deficits. Improvement continues to be noted in overall attention. Deficits remain in attention and memory. Cognitive, Physical, and Emotional fatigue was not believed to have been a barrier to the session. To date, Terence has met 0 goals.     Terence is progressing well towards his goals. Current goals remain appropriate. Goals to be updated as necessary.     Patient prognosis is Good. Patient will continue to benefit from skilled outpatient speech and language therapy to address the deficits listed in the problem list on initial evaluation, provide patient/family education and to maximize patient's level of independence in the home and community environment.   Medical necessity is demonstrated by the following IMPAIRMENTS:  Cognition: Deficits in executive functioning and memory prevent the pt from returning to work, and place his at risk of unsafe behavior and a decline in quality of life.    Cognition: Deficits in executive functioning, attention, and memory prevent the pt from relaying medically and safety relevant information in a timely manner in a state of emergency.   Barriers to Therapy: No barriers to therapy identified.  Patient's spiritual, cultural and educational needs considered and patient agreeable to plan of care and goals.  Plan:   Continue Plan of Care with focus on rehabilitation and compensation for cognitive-communication deficits s/p left sided lacunar stroke.    Chantel Cohn M.S., CF-SLP  Speech-Language  Pathologist Resident  4/26/2024

## 2024-04-26 NOTE — PROGRESS NOTES
"[facility]   Physical Therapy Treatment Note      Name: Terence Bianchi  Clinic Number: [id number]    Therapy Diagnosis:   Encounter Diagnosis   Name Primary?    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 Yes     Physician: Angie Bowers, FNP    Visit Date: 4/26/2024    Physician Orders: PT Eval and Treat  Medical Diagnosis from Referral:   R53.1 (ICD-10-CM) - Right sided weakness   I63.81 (ICD-10-CM) - Left sided lacunar stroke      Evaluation Date: 4/15/2024  Authorization Period Expiration: 4/10/25  Plan of Care Expiration: 5/13/24  Progress Note Due: 4/29/24  Visit # / Visits authorized: 1/ 99 (2)  FOTO: 1/5      Precautions: Standard and Fall     Time In:  8:48 am   Time Out: 9:26 am  Total Billable Time: 38 minutes    PTA Visit #: 0/5       Subjective     Pt reports: " I am doing fine."      He was not yet given home exercise program.  Response to previous treatment: good  Functional change: ongoing    Pain: 0/10  Location: NA    Objective      Objective Measures updated at progress report unless specified.     Treatment     Terence received the treatments listed below:      therapeutic exercises to develop strength, endurance, and ROM for 38 minutes including:    - Sci Fit Level 2 with bilateral upper and lower extremities 10 minutes.     In parallel bars:    -3x10 of the following:   Heel raise/ toe raise  Mini Squats   Hip extension  Hip abduction  Marches  Hamstring curls  - tandem stance 30 sec right and left single leg stance  - x 4-5 sec single leg stance x 10 trials each lower extremity   - x 3 laps tandem walking      (Therapeutic rest breaks throughout as needed).       Patient Education and Home Exercises       Education provided:   - HEP handout given and went over in detail. Pt reports understanding     Written Home Exercises Provided: yes. Exercises were reviewed and Terence was able to demonstrate them prior to the end of the session.  Terence " demonstrated good  understanding of the education provided. See EMR under Patient Instructions for exercises provided during therapy sessions    Assessment     Pt tolerated session well. HEP handout given and went over with pt in detail. Pt able to perform exercises and reports no questions or concerns about them. Pt remains motivated to improve and a good candidate for PT.     Terence Is progressing well towards his goals.   Pt prognosis is Good.     Pt will continue to benefit from skilled outpatient physical therapy to address the deficits listed in the problem list box on initial evaluation, provide pt/family education and to maximize pt's level of independence in the home and community environment.     Pt's spiritual, cultural and educational needs considered and pt agreeable to plan of care and goals.     Anticipated barriers to physical therapy: None at this time    Goals:   Short Term Goals: 2 weeks Date last assessed:  Status:    1. Patient will be provided with an HEP for balance and strength.   4/26/24    MET   2.  Patient will demonstrate improved endurance and activity tolerance as evidenced by ability to perform Nu-step x 15 minutes without rests breaks. 4/15/2024  Ongoing             Long Term Goals: 4 weeks  Date last assessed: Status:    1. Patient will be independent and compliant with updated HEP. 4/15/2024    Ongoing   2.  Patient will increase his   gait speed as assessed by the timed 10MWT from 1.24 m/s to 1.38 m/s to increase his safety and (I) with gait at a community level. (MDC for CVA= 0.14 m/s)     4/15/2024    Ongoing      3.Patient will improve his FOTO score from 67%  to at least 80% for improved self perception of functional mobility.(score 0-100, high score indicates greater level of function) 4/15/2024 Ongoing   4. The patient will improve his score on the  Functional Gait Assessment (FGA) from 23/30 to 28/30, indicating improved safety and (I) with dynamic,comunity level gait.  (Minimal detectable change for stroke= 4.2 points)  4/15/2024    New   5. Patient will improve bilateral single limb stance time from 1 second to > 10 seconds to demonstrate a reduction in fall risk.  4/15/2024    Ongoing        Plan     Plan of care Certification: 4/15/2024 to 5/13/24.     Outpatient Physical Therapy 2 times weekly for 4 weeks to include the following interventions: Gait Training, Manual Therapy, Moist Heat/ Ice, Neuromuscular Re-ed, Patient Education, Self Care, Therapeutic Activities, and Therapeutic Exercise.    Dominique Roy, PT

## 2024-04-26 NOTE — PROGRESS NOTES
"[facility]   Physical Therapy Treatment Note      Name: Terence Bianchi  Clinic Number: [id number]    Therapy Diagnosis:   Encounter Diagnosis   Name Primary?    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 Yes       Physician: Angie Bowers, [place]    Visit Date: 4/29/2024    Physician Orders: PT Eval and Treat  Medical Diagnosis from Referral:   R53.1 (ICD-10-CM) - Right sided weakness   I63.81 (ICD-10-CM) - Left sided lacunar stroke      Evaluation Date: 4/15/2024  Authorization Period Expiration: 4/10/25  Plan of Care Expiration: 5/13/24  Progress Note Due: 4/29/24  Visit # / Visits authorized: 2/ 99 (3)  FOTO: 1/5      Precautions: Standard and Fall     Time In:  8:45 am   Time Out: 9:30 am   Total Billable Time: 45 minutes    PTA Visit #: 0/5       Subjective     Pt reports: " I am doing  good."      He was not compliant with home exercise program.  Response to previous treatment: good  Functional change: ongoing    Pain: 0/10   Location: NA    Objective      Objective Measures updated at progress report unless specified.     Treatment     Terence received the treatments listed below:      therapeutic exercises to develop strength, endurance, and ROM for 30 minutes including:    - nu step Level 5 with bilateral upper and lower extremities 10 minutes.     - Shuttle leg press bilateral lower extremity 87# 3x10   - Shuttle leg press 62# 3x10 right lower extremity   - Shuttle leg prress 62# 3x10 left lower extremity   - shuttle calf raises 87# 3x10      (Therapeutic rest breaks throughout as needed).       Terence participated in neuromuscular re-education activities to improve: Balance and Coordination for 15 minutes. The following activities were included:     parallel bars:    - Tandem standce 30 sec x 3 trials each lower extremity forward  - single leg stance 4-5 seconds each lower extremity x 10 trials each   - Tandem walking x 6 laps no upper extremity support on bars. "      (Therapeutic rest breaks throughout as needed).       Patient Education and Home Exercises       Education provided:     - educated about importance of strength and balance. Reports understanding.     Written Home Exercises Provided: yes. Exercises were reviewed and Terence was able to demonstrate them prior to the end of the session.  Terence demonstrated good  understanding of the education provided. See EMR under Patient Instructions for exercises provided during therapy sessions    Assessment     Pt tolerated session well. Working on bilateral lower extremity strength today on shuttle and balance in parallel bars Pt able to tolerate same weight on shuttle leg press with each lower extremity indicating his strength is closer to being equal bilaterally in those muscle groups. Pt remains motivated to improve and a good candidate for PT.      Terence Is progressing well towards his goals.   Pt prognosis is Good.     Pt will continue to benefit from skilled outpatient physical therapy to address the deficits listed in the problem list box on initial evaluation, provide pt/family education and to maximize pt's level of independence in the home and community environment.     Pt's spiritual, cultural and educational needs considered and pt agreeable to plan of care and goals.     Anticipated barriers to physical therapy: None at this time    Goals:   Short Term Goals: 2 weeks Date last assessed:  Status:    1. Patient will be provided with an HEP for balance and strength.   4/26/24    MET   2.  Patient will demonstrate improved endurance and activity tolerance as evidenced by ability to perform Nu-step x 15 minutes without rests breaks. 4/15/2024  Ongoing             Long Term Goals: 4 weeks  Date last assessed: Status:    1. Patient will be independent and compliant with updated HEP. 4/15/2024    Ongoing   2.  Patient will increase his   gait speed as assessed by the timed 10MWT from 1.24 m/s to 1.38 m/s to increase his  safety and (I) with gait at a community level. (MDC for CVA= 0.14 m/s)     4/15/2024    Ongoing      3.Patient will improve his FOTO score from 67%  to at least 80% for improved self perception of functional mobility.(score 0-100, high score indicates greater level of function) 4/15/2024 Ongoing   4. The patient will improve his score on the  Functional Gait Assessment (FGA) from 23/30 to 28/30, indicating improved safety and (I) with dynamic,comunity level gait. (Minimal detectable change for stroke= 4.2 points)  4/15/2024    ongoing   5. Patient will improve bilateral single limb stance time from 1 second to > 10 seconds to demonstrate a reduction in fall risk.  4/15/2024    Ongoing        Plan     Plan of care Certification: 4/15/2024 to 5/13/24.     Outpatient Physical Therapy 2 times weekly for 4 weeks to include the following interventions: Gait Training, Manual Therapy, Moist Heat/ Ice, Neuromuscular Re-ed, Patient Education, Self Care, Therapeutic Activities, and Therapeutic Exercise.    Dominique Roy, PT

## 2024-04-29 ENCOUNTER — CLINICAL SUPPORT (OUTPATIENT)
Dept: REHABILITATION | Facility: HOSPITAL | Age: 64
End: 2024-04-29
Payer: MEDICARE

## 2024-04-29 DIAGNOSIS — R26.89 IMPAIRED BALANCE AS LATE EFFECT OF CEREBROVASCULAR ACCIDENT: Primary | ICD-10-CM

## 2024-04-29 DIAGNOSIS — I69.398 IMPAIRED BALANCE AS LATE EFFECT OF CEREBROVASCULAR ACCIDENT: Primary | ICD-10-CM

## 2024-04-29 PROBLEM — R27.8 COORDINATION IMPAIRMENT: Status: ACTIVE | Noted: 2024-04-19

## 2024-04-29 PROCEDURE — 97112 NEUROMUSCULAR REEDUCATION: CPT | Mod: PO

## 2024-04-29 PROCEDURE — 97110 THERAPEUTIC EXERCISES: CPT | Mod: PO

## 2024-04-29 NOTE — PLAN OF CARE
"[facility] OUTPAT[facility]ER[facility]LL[facility]   Occupational Therapy Initial Neurological Evaluation     Name: Terence Bianchi  [facility] Number: [id number]    Therapy Diagnosis:   Encounter Diagnoses   Name Primary?    Right sided weakness     Left sided lacunar stroke     Coordination impairment Yes      Physician: Angie Bowers FNP    Physician Orders: Eval and Treat  Medical Diagnosis:   R53.1 (ICD-10-CM) - Right sided weakness   I63.81 (ICD-10-CM) - Left sided lacunar stroke     Evaluation Date: 4/19/2024  Progress Note(s) Due: 5/17/2024  Plan of Care Certification Period: 6/14/2024  Insurance Authorization Period Expiration: 12/31/2024  Date of Return to MD: sandro  Visit # / Visits authorized: 1 / 1  FOTO: 1/ 3; last assessed 4/19/2024         Precautions:  Standard    Time In: 0805  Time Out: 0845  Total Billable Time: 40 minutes    Subjective     Date of Onset: 3/21/2024    History of Current Condition: Per patient, "I woke up about 1 in the morning, weakness in my leg, weakness in my arm." Difficulty holding cup without spilling. No therapy after leaving hospital.    PMH per Dr. Pillai's d/c summary on 3/23/24:   HPI: 63-year-old male with hypertension, hyperlipidemia and tobacco use who presented to the ER on 03/21 with complaints of right-sided weakness.  Patient admits to being incompletely compliant with his antihypertensives.  He felt well going to sleep last night.  At about midnight he woke up to go to the bathroom as he often does because he takes diuretic.  However he noticed that he was uncoordinated and falling to his right side.  Stayed up for several hours and noticed that he had difficulty holding on to objects (he is right-handed).  He went back to sleep and woke up the morning with persistent symptoms.  Therefore he came to the emergency room.  In the emergency room he was normotensive.  Laboratory workup was unrevealing.  CT head was negative.  He was out of the timeframe for TNK.  NIH score " "was 2.     Involved Side: right   Dominant Side: Right    Surgical Procedure: n/a    Imaging:    MRI Brain Without Contrast  3/21/2024  FINDINGS:  There is 11 x 7 mm focus of restricted diffusion within the left thalamus compatible with acute lacunar infarct.  There is chronic involutional change.  There is chronic white matter microischemic change.  No acute intracranial hemorrhage, extra-axial fluid collection, hydrocephalus, mass effect, or midline shift is identified.  The visualized vascular flow voids appear intact.  The visualized paranasal sinuses are clear.  The visualized mastoid air cells are clear.    Previous Therapy: acute care    Pain:  Pain Related Behaviors Observed: No   Patient denies pain; "no more than what I've always had after walking on my legs for 65 years."     Functional Limitations/Social History:    Home/Living environment: Lives with significant other and adopted daughter (has special needs) in single-story home with no steps to enter. Reports house is handicapped accessible with hand rails near commode and shower.    - DME: he does have grab bars all around the bathroom because they were in the house when he bought it.    Prior Level of Function:   ADLs: fully independent  IADL/ Home Responsibilities: "I do everything." Including caring for 15 y/o daughter with special needs.  Driving: Yes  Work: Retired , he also used to be involved in [employer]  Leisure: Takes daughter bike riding but does not have a routine     Current Level of Function:    AM-PAC 6 CLICK ADL  How much help from another person does this patient currently need?  1 = Unable, Total/Dependent Assistance  2 = A lot, Maximum/Moderate Assistance  3 = A little, Minimum/Contact Guard/Supervision  4 = None, Modified [place]/Independent    Eatin         Groomin        UB Dressin     LB Dressin      Min difficulty with raising right lower extremity to thread pants.  Bathin       No " "concerns with either standing to shower or get down in a jacuzzi tub.  Toiletin  Total:     AM-PAC Raw Score CMS "G-Code Modifier Level of Impairment Assistance   6 % Total / Unable   7 - 9 CM 80 - 100% Maximal Assist   10-14 CL 60 - 80% Moderate Assist   15 - 19 CK 40 - 60% Moderate Assist   20 - 22 CJ 20 - 40% Minimal Assist   23 CI 1-20% SBA / CGA   24 CH 0% Independent/ Mod I       Min difficulty signing name. Some memory lapses.    IADL/ Home Responsibilities: same as prior  Driving: Yes - "I ain't ever quit. 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    Past Surgical History:  Terence Bianchi  has a past surgical history that includes Eye surgery (Right, ); Colonoscopy (N/A, 2020); Cyst Removal (Right); and Robot-assisted laparoscopic repair of inguinal hernia (Right, 2021).    Current Medications:  Terence has a current medication list which includes the following prescription(s): amlodipine, aspirin, and atorvastatin, and the following Facility-Administered Medications: electrolyte-s (ph 7.4) and lidocaine (pf) 10 mg/ml (1%).    Allergies:  Review of patient's allergies indicates:   Allergen Reactions    Lisinopril Swelling     Lips swelled      Objective     At 0809, DQ=427/98, HR=78, ObE1=000%.     Cognitive Exam:  Oriented: Person, Place, and Situation  Behaviors: normal, cooperative, very talkative  Follows Commands/attention: Follows two-step commands  Communication: patient talkative, easily internally distracted   Memory: patient reports some lapses in therapy   Safety awareness/insight to disability: aware of diagnosis, treatment, and " prognosis  Coping skills/emotional control: Appropriate to situation    Visual/Perceptual:  He reports no changes with vision following stroke.  Will occasionally wear readers.   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     Physical Exam:  Postural examination/scapula alignment: Affected scapula depressed and Affected scapula upwardly rotated; mildly slouched posture  Joint integrity: no concerns  Skin integrity: Visible skin intact.   Edema: No obvious edema noted.    Palpation: mildly tender right coracoid & upper trap    Bilateral shoulder WFL except min decr internal rotation bilaterally    Strength 4/19/2024 4/19/2024   **within available ROM** Right  Left    Shoulder flex 4-/5 5/5   Shoulder abd 4/5 4+/5   Shoulder ER 3+/5 4/4   Shoulder IR 5/5 5/5   Shoulder Extension 5/5 5/5   Shoulder Horizontal adduction 4/5 4/5   Elbow flex 4+/5 5/5   Elbow ext 4/5 5/5   Wrist flex 4+/5 4+/5   Wrist ext 4+/5 4+/5   Supination 4+/5 4+/5   Pronation 4+/5 4+/5      Hand Strength (FERN Dynamometer, Setting II)   (lbs) Right   4/19/2024  Left   4/19/2024    1 133 126   2 134 128   3 122 120   Avg 4/19/2024 129.7 124.7   [norms for men aged 65-69: R=89.7 +/-20.4; L=76.8 +/-20.3 (Tamikatz et al, 1985)]    Pinch Right   4/19/2024  Left   4/19/2024    Lateral 26 20   Tip (2 point) 13 13   3 jaw dale 21 20   [Lateral pinch norms for men aged 65-69: right: 23.4+/-3.9; left: 22.0+/-3.6 (Faheem et al, 1985)]   [2-point pinch norms for men aged 65-69: right: 17.0+/-4.2; left: 15.4+/-2.9 (Tamikatz et al, 1985)]   [3-point pinch norms for men aged 65-69: right: 21.4+/-3.0; left: 21.2+/-4.1 (Mathiowetz et al, 1985)]     Gross motor coordination:   KHUSHBU (Rapid Alternating Movements): min decreased bilateral   Finger to Nose (5 times): wfl bilateral   Finger Flicks (coordination moving from digit flexion to digit extension): wfl    Fine motor coordination:   -   Thumb to Finger Opposition: wfl     9 Hole Peg Test (9HPT)  Right  Left    4/19/2024 28s 28s   [norms for men aged 61-65: R=20.87s +/-3.50s; L=21.60s +/-2.98s (iVdhya et al, 2003)]     Tone:  Modified Nola Scale:   0 - No increase in muscle tone    Sensation:  Terence  reports no concerns with sensation  Light touch:  intact bilaterally, though slightly asymmetrical as he reports right is slightly less than the left.     Balance:   Static Sit - NORMAL: No deviations seen in posture held statically  Dynamic sit- GOOD: Takes MODERATE challenges from all directions  Static Stand - GOOD: Takes MODERATE challenges from all directions  Dynamic stand - see physical therapy evaluation    Endurance Deficit: none to mild                          Treatment     Total Treatment time separate from Evaluation time: 0 min    Home Exercises and Patient Education Provided     Education provided:   -role of OT, goals for OT, scheduling/cancellations, insurance limitations with patient.    Written Home Exercises Provided: verbal ed only. Written HEP to follow.  Terence demonstrated good  understanding of the education provided.     Assessment     Terence Bianchi is a 63 y.o. male referred to outpatient occupational therapy and presents with a medical diagnosis of  Right sided weakness, Left sided lacunar stroke.  Following medical record review it is determined that pt will benefit from occupational therapy services in order to maximize independence, safety & efficiency with BADL/ IADL/ work/ leisure participation, as well as pain free and/or functional use of the right upper extremity. The following goals were discussed with the patient and patient is in agreement with them as to be addressed in the treatment plan. The patient's rehab potential is Good.     Anticipated barriers to occupational therapy: distance to therapy    Plan of care discussed with patient: Yes  Patient's spiritual, cultural and educational needs considered and patient is agreeable to the plan of care and goals as stated  below:     Medical Necessity is demonstrated by the following  Occupational Profile/History  Co-morbidities and personal factors that may impact the plan of care [] LOW: Brief chart review  [x] MODERATE: Expanded chart review   [] HIGH: Extensive chart review    Moderate / High Support Documentation:  has a past medical history of BRVO (branch retinal vein occlusion), Cataract, High cholesterol, Hypertension, and Hypertensive retinopathy of both eyes.  has a past surgical history that includes Eye surgery (Right, 2020); and Robot-assisted laparoscopic repair of inguinal hernia (Right, 4/22/2021).      Examination  Performance deficits relating to physical, cognitive or psychosocial skills that result in activity limitations and/or participation restrictions  [] LOW: addressing 1-3 Performance deficits  [x] MODERATE: 3-5 Performance deficits  [] HIGH: 5+ Performance deficits (please support below)    Moderate / High Support Documentation:    Physical:  Muscle Power/Strength  Muscle Endurance  Fine Motor Coordination    Cognitive:  Attention  Memory    Psychosocial:    No Deficits     Treatment Options [] LOW: Limited options  [x] MODERATE: Several options  [] HIGH: Multiple options      Decision Making/ Complexity Score: moderate       The following goals were discussed with the patient and patient is in agreement with them as to be addressed in the treatment plan.     Goals:  Short Term Goals (4 weeks) Status When Last Assessed  Progressing/ Met   1) patient to be independent with initial HEP  progressing 4/19/2024    2) patient to increase right shoulder strength to 4/5 flexion & 4-/5 external rotation to increase independence with overhead activities Strength 4/19/24   **within available ROM** Right    Shoulder flex 4-/5   Shoulder abd 4/5   Shoulder ER 3+/5   Shoulder IR 5/5   Shoulder Extension 5/5   Shoulder Horizontal adduction 4/5   Elbow flex 4+/5   Elbow ext 4/5   Wrist flex 4+/5   Wrist ext 4+/5    Supination 4+/5   Pronation 4+/5    progressing 4/19/2024    3) patient to improve right upper extremity fine motor coordination (FMC) with a 9-Hole Peg Test time of 26s or better (9HPT) Right  Left    4/19/24 28s 28s    progressing 4/19/2024        LongTerm Goals (8 weeks) Status When Last Assessed  Progressing/ Met   1) patient to increase right shoulder strength to 4+/5 flexion & abduction and & 4/5 (or better) external rotation to increase independence with overhead activities Strength 4/19/24   **within available ROM** Right    Shoulder flex 4-/5   Shoulder abd 4/5   Shoulder ER 3+/5   Shoulder IR 5/5   Shoulder Extension 5/5   Shoulder Horizontal adduction 4/5   Elbow flex 4+/5   Elbow ext 4/5   Wrist flex 4+/5   Wrist ext 4+/5   Supination 4+/5   Pronation 4+/5    progressing 4/19/2024    2) patient to improve right upper extremity fine motor coordination (FMC) with a 9-Hole Peg Test time of 24s or better (9HPT) Right  Left    4/19/24 28s 28s    progressing 4/19/2024    3) patient will be able to sign name without difficulty x5 trials in one session Difficult on eval per patient report. progressing 4/19/2024       Additional functional goals to be added as pt progresses and per his priorities.     Plan   Certification Period/Plan of care expiration: 4/19/2024 to 6/14/2024.    Outpatient Occupational Therapy 2 times weekly for 8 weeks to include the following interventions: Manual Therapy, Moist Heat/ Ice, Neuromuscular Re-ed, Patient Education, Self Care, Therapeutic Activities, and Therapeutic Exercise, and any other treatment modalities that will facilitate Terence Bianchi's ability to reach his goals.      Nicky Wilkinson, OT        Physician's Signature: _________________________________________ Date: ________________

## 2024-04-30 ENCOUNTER — LAB VISIT (OUTPATIENT)
Dept: LAB | Facility: HOSPITAL | Age: 64
End: 2024-04-30
Attending: PHYSICIAN ASSISTANT
Payer: MEDICARE

## 2024-04-30 DIAGNOSIS — I63.81 ACUTE LACUNAR INFARCTION: ICD-10-CM

## 2024-04-30 LAB
ALBUMIN SERPL BCP-MCNC: 3.9 G/DL (ref 3.5–5.2)
ALP SERPL-CCNC: 70 U/L (ref 55–135)
ALT SERPL W/O P-5'-P-CCNC: 27 U/L (ref 10–44)
ANION GAP SERPL CALC-SCNC: 7 MMOL/L (ref 8–16)
AST SERPL-CCNC: 21 U/L (ref 10–40)
BILIRUB SERPL-MCNC: 0.4 MG/DL (ref 0.1–1)
BUN SERPL-MCNC: 14 MG/DL (ref 8–23)
CALCIUM SERPL-MCNC: 9.8 MG/DL (ref 8.7–10.5)
CHLORIDE SERPL-SCNC: 107 MMOL/L (ref 95–110)
CO2 SERPL-SCNC: 28 MMOL/L (ref 23–29)
CREAT SERPL-MCNC: 1.4 MG/DL (ref 0.5–1.4)
EST. GFR  (NO RACE VARIABLE): 55.8 ML/MIN/1.73 M^2
GLUCOSE SERPL-MCNC: 100 MG/DL (ref 70–110)
POTASSIUM SERPL-SCNC: 5.4 MMOL/L (ref 3.5–5.1)
PROT SERPL-MCNC: 7.3 G/DL (ref 6–8.4)
SODIUM SERPL-SCNC: 142 MMOL/L (ref 136–145)

## 2024-04-30 PROCEDURE — 80053 COMPREHEN METABOLIC PANEL: CPT | Performed by: PHYSICIAN ASSISTANT

## 2024-04-30 PROCEDURE — 36415 COLL VENOUS BLD VENIPUNCTURE: CPT | Mod: PO | Performed by: PHYSICIAN ASSISTANT

## 2024-05-01 ENCOUNTER — OFFICE VISIT (OUTPATIENT)
Dept: FAMILY MEDICINE | Facility: CLINIC | Age: 64
End: 2024-05-01
Payer: MEDICARE

## 2024-05-01 VITALS
HEART RATE: 75 BPM | HEIGHT: 73 IN | DIASTOLIC BLOOD PRESSURE: 84 MMHG | OXYGEN SATURATION: 97 % | WEIGHT: 194.44 LBS | BODY MASS INDEX: 25.77 KG/M2 | SYSTOLIC BLOOD PRESSURE: 132 MMHG

## 2024-05-01 DIAGNOSIS — I63.81 LEFT SIDED LACUNAR STROKE: Primary | ICD-10-CM

## 2024-05-01 DIAGNOSIS — Z72.0 TOBACCO USE: ICD-10-CM

## 2024-05-01 PROCEDURE — 99999 PR PBB SHADOW E&M-EST. PATIENT-LVL III: CPT | Mod: PBBFAC,,, | Performed by: PHYSICIAN ASSISTANT

## 2024-05-01 PROCEDURE — 99213 OFFICE O/P EST LOW 20 MIN: CPT | Mod: PBBFAC,PO | Performed by: PHYSICIAN ASSISTANT

## 2024-05-01 NOTE — PROGRESS NOTES
[facility]  Speech Therapy Treatment Note- Neurological Rehabilitation  Date: 5/2/2024     Name: Terence Bianchi   MRN: [id number]   Therapy Diagnosis:   Encounter Diagnosis   Name Primary?    Cognitive communication deficit Yes     Physician: Angie Bowers FNP  Physician Orders: Ambulatory Referral to Speech Therapy   Medical Diagnosis: Left sided lacunar stroke [I63.81]     Visit #/ Visits Authorized: 2/ 12  Date of Evaluation:  4/22/2024  Insurance Authorization Period: 4/26/2024 to 12/31/2024  Plan of Care Expiration Date:    7/15/2024  Extended Plan of Care:  NA   Progress Note: 5/24/2024     Time In:  0804  Time Out:  0845  Total Billable Time: 41    Precautions: Standard  Subjective:   Patient reports: no changes since last speech therapy session.   He was compliant to home exercise program.   Response to previous treatment: good  Pain Scale: no pain indicated throughout session  Objective:   TIMED  Procedure Min.   Cognitive Therapeutic Interventions, first 15 minutes CPT 42185  15   Cognitive Therapeutic Interventions, each additional 15 minutes CPT 81273  26           Short Term Goals: (6 weeks) Current Progress:   Patient will complete alternating attention tasks in quiet environment with 90% accuracy independently.     Progressing/ Not Met 5/2/2024   Patient completed alternating attention task connecting pictures in order of corresponding list order with 80% accuracy independently.    2. Patient will complete divided attention tasks in quiet environment with 90% accuracy independently      Progressing/ Not Met 5/2/2024   Patient completed written divided attention task with organization of even/odd numbers by row with 5 minute time restraint with 100% accuracy independently. To increase complexity, patient completed with background music to add distraction with 90% accuracy independently.     Met x1      3. Patient will complete a simple written task to increase verbal attention and  memory (I.e. sample bill paying activity, recipe, or multiple choice comprehension questions to 1 paragraphs) with 80% accuracy.      Progressing/ Not Met 2024   Not formally addressed this session.    4. Patient will participate in Attentive Reading and Constrained Summarization with an adequate summary including no more than 5 general words, extraneous information or pronouns to improve thought organization, verbal fluency, and efficiency in conversation.      Progressing/ Not Met 2024   Not formally addressed this session.    5. Patient will complete high level problem solving tasks with 90% accuracy independently.      Progressing/ Not Met 2024   Not formally addressed this session.    6. Patient will utilize spaced retrieval training (starting at 15 seconds) with recall of 5/5 units of information at 16 minutes with use of a timer independently.        Progressing/ Not Met 2024   Not formally addressed this session.    6. Patient will recall 4/4 memory strategies independently across 3 consecutive sessions.       Progressing/ Not Met 2024   Not formally addressed this session.      Patient Education/Response:   Patient educated regarding the followin. Reviewed initial evaluation results and POC  2. Neuropsychological Functions triangle- cognitive processing   3. Cognitive load/fatigue- cognitive breaks to improve overall cognitive fatigue    Home program established: Program modified based on patient progress  Patient verbalized understanding to all above education provided.     See Electronic Medical Record under Patient Instructions for exercises provided throughout therapy.  Assessment:   Terence participated well today in today's session which focused on memory, sustained attention, alternating attention, divided attention, education, and memory strategies . Today strengths were noted in self-awareness to deficits. Improvement continues to be noted in overall attention. Deficits  remain in attention and memory. Cognitive, Physical, and Emotional fatigue was not believed to have been a barrier to the session. To date, Terence has met 0 goals.     Terence is progressing well towards his goals. Current goals remain appropriate. Goals to be updated as necessary.     Patient prognosis is Good. Patient will continue to benefit from skilled outpatient speech and language therapy to address the deficits listed in the problem list on initial evaluation, provide patient/family education and to maximize patient's level of independence in the home and community environment.   Medical necessity is demonstrated by the following IMPAIRMENTS:  Cognition: Deficits in executive functioning and memory prevent the pt from returning to work, and place his at risk of unsafe behavior and a decline in quality of life.    Cognition: Deficits in executive functioning, attention, and memory prevent the pt from relaying medically and safety relevant information in a timely manner in a state of emergency.   Barriers to Therapy: No barriers to therapy identified.  Patient's spiritual, cultural and educational needs considered and patient agreeable to plan of care and goals.  Plan:   Continue Plan of Care with focus on rehabilitation and compensation for cognitive-communication deficits s/p left sided lacunar stroke.    ORTEGA Grissom, CCC-SLP, CBIS  Speech-Language Pathology  5/2/2024

## 2024-05-01 NOTE — PROGRESS NOTES
"Subjective:      Patient ID: Terence Bianchi is a 64 y.o. male.    Chief Complaint: Follow-up (1 month f/u)    HPI  Patient has PMH of HTN, colon polyps, and tobacco use.  In the process of quitting smoking cigarettes.     Patient saw me 4/2/2024 for hospital follow up after lacunar infarct 3/21/2024.  Had increased blood pressure and in process of quitting smoking.    HTN-controlled today.  Smoking 6 cigarettes daily and trying to decrease.  Wife smoking as well.  Today patient reports that he is feeling better.  Having balance issues-in PT/OT.    Review of Systems   Respiratory:  Negative for shortness of breath.    Cardiovascular:  Negative for chest pain.   Neurological:  Positive for weakness (slight right-sided). Negative for headaches.       Objective:   /84   Pulse 75   Ht 6' 1" (1.854 m)   Wt 88.2 kg (194 lb 7.1 oz)   SpO2 97%   BMI 25.65 kg/m²     Physical Exam  Vitals reviewed.   Constitutional:       Appearance: Normal appearance. He is well-developed.   HENT:      Head: Normocephalic and atraumatic.      Right Ear: External ear normal.      Left Ear: External ear normal.   Eyes:      Conjunctiva/sclera: Conjunctivae normal.   Cardiovascular:      Rate and Rhythm: Normal rate and regular rhythm.      Heart sounds: Normal heart sounds. No murmur heard.     No friction rub. No gallop.   Pulmonary:      Effort: Pulmonary effort is normal. No respiratory distress.      Breath sounds: Normal breath sounds. No wheezing, rhonchi or rales.   Musculoskeletal:         General: Normal range of motion.   Skin:     General: Skin is warm and dry.      Findings: No rash.   Neurological:      General: No focal deficit present.      Mental Status: He is alert and oriented to person, place, and time.   Psychiatric:         Mood and Affect: Mood normal.         Behavior: Behavior normal.         Judgment: Judgment normal.       Assessment:      1. Left sided lacunar stroke    2. Tobacco use       Plan:   1. Left " sided lacunar stroke  Improving.  NP Robinson, neurology, is managing this.    2. Tobacco use  Encouraged to quit completely.    Follow up in 2 months with Dr. Villa.  Patient agreed with plan and expressed understanding.    Thank you for allowing me to serve you,

## 2024-05-02 ENCOUNTER — CLINICAL SUPPORT (OUTPATIENT)
Dept: REHABILITATION | Facility: HOSPITAL | Age: 64
End: 2024-05-02
Payer: MEDICARE

## 2024-05-02 DIAGNOSIS — R26.89 IMPAIRED BALANCE AS LATE EFFECT OF CEREBROVASCULAR ACCIDENT: Primary | ICD-10-CM

## 2024-05-02 DIAGNOSIS — R41.841 COGNITIVE COMMUNICATION DEFICIT: Primary | ICD-10-CM

## 2024-05-02 DIAGNOSIS — R27.8 COORDINATION IMPAIRMENT: ICD-10-CM

## 2024-05-02 DIAGNOSIS — R53.1 RIGHT SIDED WEAKNESS: Primary | ICD-10-CM

## 2024-05-02 DIAGNOSIS — I69.398 IMPAIRED BALANCE AS LATE EFFECT OF CEREBROVASCULAR ACCIDENT: Primary | ICD-10-CM

## 2024-05-02 PROCEDURE — 97129 THER IVNTJ 1ST 15 MIN: CPT | Mod: PO

## 2024-05-02 PROCEDURE — 97112 NEUROMUSCULAR REEDUCATION: CPT | Mod: PO

## 2024-05-02 PROCEDURE — 97110 THERAPEUTIC EXERCISES: CPT | Mod: PO

## 2024-05-02 PROCEDURE — 97130 THER IVNTJ EA ADDL 15 MIN: CPT | Mod: PO

## 2024-05-02 NOTE — PATIENT INSTRUCTIONS
Kinesio Tape Instructions        Kinesio Tape is latex-free, hypoallergenic and wearable for days at a time. The tape can be worn during showers and during physical activity.    Purposes of Taping:  Provide sensory input and increase awareness of a muscle. This usually help you recruit & strengthen that muscle more easily.  **    To assist a joint to hold a position so an overstretched muscle is provided time to shorten, allowing it to work more effectively.  To assist in the release of fascial restrictions or to relax an overused muscle. This allows more optimal alignment and decreased pain in an area.  To position a part of the body in better alignment & allow muscles to contract & work in a better position.  To decrease swelling, edema & bruising. This will allow for more rapid healing & decreased pain.    Wearing Kinesio Tape:  This tape is a therapeutic treatment and should be left in place x3-5 days if possible. Please watch the area closely and remove the tape should any irritation occur; including redness, swelling or itching.   Remove the tape in time to allow a 24-hour break before returning to therapy to be taped again.  Tape can get wet in pools or baths.   If corners or edges of the tape begin to roll up, it can be trimmed with scissors. Simply trim the edges to prevent them from getting caught of clothes & being pulled off more.   DO NOT USE HEAT ON TOP OF THE TAPE (heating pad, hair-dryer) as it will make removal of the tape more difficult and painful.    Removal:   The tape can be removed early if any pain, skin irritation, or other adverse reaction is experienced.  Please take time when removing the tape. Place a thin layer of baby oil, vegetable oil or hair conditioner over the entire area of tape and let it soak for at least 5 minutes. These products will help soften the adhesive, making tape removal a more pleasant experience. Getting the tape wet and/or removing it in the shower can also help.    Loosen one end of the tape & begin slowly peeling the tape from the skin. Stabilize the skin as you move the tape away. Moving in the direction of hair growth may cause less discomfort.   DO NOT PULL THE TAPE OFF IN A QUICK MOTION.   After removal, use plenty of lotion to hydrate the skin & relieve any irritation. Please do not apply lotion right before re-application of tape, as it decreases the ability of the tape to adhere to the skin.         Strengthening: Resisted External Rotation      Hold  purple  elastic band in right hand, elbow at side and hand in front of belly button. Rotate forearm out, keeping elbow close to side If you have trouble keeping your elbows at your sides, put a small towel under your elbows. Repeat 10 times per set. Do 3 sets per session. Do 3 sessions per week.   Copyright © [employer]. All rights reserved.        Strengthening: Resisted Internal Rotation      Secure one end of the  purple  elastic band in a closed door. Hold the elastic band in your right hand, elbow at side and hand in out to side. Rotate forearm toward midline, keeping elbow close to side. If you have trouble keeping your elbow at your sides, put a small towel under your elbow.     Repeat 10 times per set. Do 3 sets per session. Do 3 sessions per week.     Copyright © [employer]. All rights reserved.      Strengthening: Resisted Row      Face anchor at waist height, medium to wide stance. Thumbs up, pull arms back, squeezing shoulder blades together - like you're trying to make your elbows touch behind your back. Do not shrug up. Slowly return to start.  Repeat 10 times per set. Do 3 sets per session. Do 3 sessions per week.   Copyright © [employer]. All rights reserved.      Strengthening: Resisted Extension      Secure one end of the  purple  elastic band in a closed door. Hold the elastic band in the right hand, elbow straight and arm in front of body. Pull arm back, elbow straight, and squeeze shoulder blades back. Do not  shrug.  Repeat 10 times per set. Do 3 sets per session. Do 3 sessions per week.   Copyright © [facility]. All rights reserved.      Strengthening: Resisted Adduction      Secure band in side of closed door. Hold  purple  elastic band in the right hand with arm at your side, elbow straight. Pull hand down toward your hip and then allow the resistance of the band to slowly draw your hand back out to your side. Repeat 10 times per set. Do 3 sets per session. Do 3 sessions per week.         Strengthening: Resisted scapular depression    Secure band over top of closed door. Hold  purple  elastic band in the right hand with arm at your side, elbow straight. Reach down toward the floor and then allow the resistance of the band to draw the shoulder back up. Do not actively shrug your shoulder when you relax.   Repeat 10 times per set. Do 3 sets per session. Do 3 sessions per week.         Strengthening: Resisted shoulder flexion          Strengthening: Resisted shoulder abduction         To progress band exercises:   When 3 sets of 10 is fairly easy, increase reps to 15 per set.   When this is easy, let me know and we will get a more difficult band.

## 2024-05-02 NOTE — PROGRESS NOTES
"[facility]   Physical Therapy Treatment Note      Name: Terence Bianchi  Clinic Number: [id number]    Therapy Diagnosis:   Encounter Diagnosis   Name Primary?    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 Yes         Physician: Angie Bowers, [place]    Visit Date: 5/2/2024    Physician Orders: PT Eval and Treat  Medical Diagnosis from Referral:   R53.1 (ICD-10-CM) - Right sided weakness   I63.81 (ICD-10-CM) - Left sided lacunar stroke      Evaluation Date: 4/15/2024  Authorization Period Expiration: 4/10/25  Plan of Care Expiration: 5/13/24  Progress Note Due: 4/29/24  Visit # / Visits authorized: 3/ 99 (4)   FOTO: 1/5      Precautions: Standard and Fall     Time In: 9:32 am  Time Out: 10:12 am  Total Billable Time: 40 minutes    PTA Visit #: 0/5       Subjective     Pt reports: "I met Alex Coles yesterday."      He was not compliant with home exercise program. "I trimmed the hedges, mowed the yard. In moderation."     Response to previous treatment: good  Functional change: ongoing    Pain: 0/10   Location: NA    Objective      Objective Measures updated at progress report unless specified.     Treatment     Terence received the treatments listed below:      therapeutic exercises to develop strength, endurance, and ROM for 32 minutes including:    - Nu step Level 7 with bilateral upper and lower extremities 9.5 minutes (brief rest break at 5 minutes).     - Shuttle leg press bilateral lower extremity 93# 3x10 at slow pace  - Shuttle leg press 62# 3x10 right lower extremity at slow pace   - Shuttle leg prress 62# 3x10 left lower extremity at slow pace     (Therapeutic rest breaks throughout as needed).       Terence participated in neuromuscular re-education activities to improve: Balance and Coordination for 8 minutes. The following activities were included:     parallel bars:   - Tandem stance 30 sec x 2 trials each lower extremity forward  - single leg stance 15-18 " seconds each lower extremity x 3 trials each with occasional single upper extremity support   - Tandem walking x 3 laps no upper extremity support on bars.      (Therapeutic rest breaks throughout as needed).       Patient Education and Home Exercises       Education provided:     - educated about importance of strength and balance. Reports understanding.     Written Home Exercises Provided: yes. Exercises were reviewed and Terence was able to demonstrate them prior to the end of the session.  Terence demonstrated good  understanding of the education provided. See EMR under Patient Instructions for exercises provided during therapy sessions    Assessment     Pt tolerated session well with focus on lower extremity strengthening and balance. Pt tolerated increased load on Nu Step and bilateral shuttle leg press today. Pt able to tolerate same weight on shuttle leg press but performed exercise at slightly greater pace on left leg. Able to maintain single leg stance for longer time bilaterally. Pt remains motivated to improve and a good candidate for PT.      Terence Is progressing well towards his goals.   Pt prognosis is Good.     Pt will continue to benefit from skilled outpatient physical therapy to address the deficits listed in the problem list box on initial evaluation, provide pt/family education and to maximize pt's level of independence in the home and community environment.     Pt's spiritual, cultural and educational needs considered and pt agreeable to plan of care and goals.     Anticipated barriers to physical therapy: None at this time    Goals:   Short Term Goals: 2 weeks Date last assessed:  Status:    1. Patient will be provided with an HEP for balance and strength.   4/26/24    MET   2.  Patient will demonstrate improved endurance and activity tolerance as evidenced by ability to perform Nu-step x 15 minutes without rests breaks. 4/15/2024  Ongoing             Long Term Goals: 4 weeks  Date last assessed:  Status:    1. Patient will be independent and compliant with updated HEP. 4/15/2024    Ongoing   2.  Patient will increase his   gait speed as assessed by the timed 10MWT from 1.24 m/s to 1.38 m/s to increase his safety and (I) with gait at a community level. (MDC for CVA= 0.14 m/s)     4/15/2024    Ongoing      3.Patient will improve his FOTO score from 67%  to at least 80% for improved self perception of functional mobility.(score 0-100, high score indicates greater level of function) 4/15/2024 Ongoing   4. The patient will improve his score on the  Functional Gait Assessment (FGA) from 23/30 to 28/30, indicating improved safety and (I) with dynamic,comunity level gait. (Minimal detectable change for stroke= 4.2 points)  4/15/2024    ongoing   5. Patient will improve bilateral single limb stance time from 1 second to > 10 seconds to demonstrate a reduction in fall risk.  4/15/2024    Ongoing        Plan     Plan of care Certification: 4/15/2024 to 5/13/24.     Outpatient Physical Therapy 2 times weekly for 4 weeks to include the following interventions: Gait Training, Manual Therapy, Moist Heat/ Ice, Neuromuscular Re-ed, Patient Education, Self Care, Therapeutic Activities, and Therapeutic Exercise.    Jessica Araiza, PT

## 2024-05-02 NOTE — PROGRESS NOTES
[facility]  Occupational Therapy Treatment Note     Date: 5/2/2024  Name: Terence Bianchi  Clinic Number: [id number]    Therapy Diagnosis:   Encounter Diagnoses   Name Primary?    Right sided weakness Yes    Coordination impairment      Physician: Angie Bowers FNP    Physician Orders: Eval and Treat  Medical Diagnosis:   R53.1 (ICD-10-CM) - Right sided weakness   I63.81 (ICD-10-CM) - Left sided lacunar stroke      Evaluation Date: 4/19/2024  Progress Note(s) Due: 5/17/2024  Plan of Care Certification Period: 6/14/2024  Insurance Authorization Period Expiration: 12/31/2024  Date of Return to MD: tba  Visit # / Visits authorized: 1 / 20 (+eval)  FOTO: 1/ 3; last assessed 4/19/2024           Precautions:  Standard     Time In: 0846  Time Out: 0930  Total Billable Time: 44 minutes      Subjective     Patient reports: no concerns today  He was compliant with home exercise program given last session (none given).   Response to previous treatment: no concerns from eval  Functional change:  no significant change compared to previous session per pt report     Pain: 0/10  Location:  n/a     Objective     Objective Measures updated at progress report unless specified.    At 0849, CR=991/80, HR=66, SpO2=97%.     Treatment     Terence received the treatments listed below:      therapeutic exercises to develop strength, endurance, ROM, and flexibility for 35 minutes including:  Gentle stretching and mobilization of the soft-tissues throughout the right shoulder, including levator, serratus anterior, subscapularis, anterior complex, lats and combined internal rotators to allow for increased PROM.    -Gentle mobilization and stretching to increase ROM toward right scapular retraction & depression. Following stretching, addressed strengthening via isometric, eccentric and concentric contractions as tolerated with tactile facilitation of the movement.     Strengthening 2x10 as per HEP - right shoulder  extension, adduction, internal rotation, external rotation, flexion, abduction; right scapular depression; bilateral scapular retraction    manual therapy techniques were applied for 9 minutes, including:  KT for manual correction to position the humeral head more posteriorly. Applied anterior (subclavicularly) to posterior (Y-cut, ~75% tension over the anterior humeral head, no tension in long tails.)      Patient Education and Home Exercises     Education provided:   - positioning of shoulder anatomy during exercises  - Progress towards goals     Written Home Exercises Provided: yes.  Exercises were reviewed and Terence was able to demonstrate them prior to the end of the session.  Terence demonstrated good  understanding of the home exercise program provided. See electronic medical record under Patient Instructions for exercises provided during therapy sessions.       Assessment     Terence tolerated his initial OT session well and appeared to understand his HEP.      Terence is progressing well towards his goals and there are no updates to goals at this time. Pt prognosis is Good.     Patient will continue to benefit from skilled outpatient occupational therapy to address the deficits listed in the problem list on initial evaluation provide patient/family education and to maximize patient's level of independence in the home and community environment.     Patient's spiritual, cultural and educational needs considered and patient agreeable to plan of care and goals.    Anticipated barriers to occupational therapy: distance to therapy    Goals:  Short Term Goals (4 weeks) Status When Last Assessed  Progressing/ Met   1) patient to be independent with initial HEP   progressing 4/19/2024    2) patient to increase right shoulder strength to 4/5 flexion & 4-/5 external rotation to increase independence with overhead activities Strength 4/19/24   **within available ROM** Right    Shoulder flex 4-/5   Shoulder abd 4/5   Shoulder  ER 3+/5   Shoulder IR 5/5   Shoulder Extension 5/5   Shoulder Horizontal adduction 4/5   Elbow flex 4+/5   Elbow ext 4/5   Wrist flex 4+/5   Wrist ext 4+/5   Supination 4+/5   Pronation 4+/5    progressing 4/19/2024    3) patient to improve right upper extremity fine motor coordination (FMC) with a 9-Hole Peg Test time of 26s or better (9HPT) Right  Left    4/19/24 28s 28s    progressing 4/19/2024          LongTerm Goals (8 weeks) Status When Last Assessed  Progressing/ Met   1) patient to increase right shoulder strength to 4+/5 flexion & abduction and & 4/5 (or better) external rotation to increase independence with overhead activities Strength 4/19/24   **within available ROM** Right    Shoulder flex 4-/5   Shoulder abd 4/5   Shoulder ER 3+/5   Shoulder IR 5/5   Shoulder Extension 5/5   Shoulder Horizontal adduction 4/5   Elbow flex 4+/5   Elbow ext 4/5   Wrist flex 4+/5   Wrist ext 4+/5   Supination 4+/5   Pronation 4+/5    progressing 4/19/2024    2) patient to improve right upper extremity fine motor coordination (FMC) with a 9-Hole Peg Test time of 24s or better (9HPT) Right  Left    4/19/24 28s 28s    progressing 4/19/2024    3) patient will be able to sign name without difficulty x5 trials in one session Difficult on eval per patient report. progressing 4/19/2024       Additional functional goals to be added as pt progresses and per his priorities.      Plan   Certification Period/Plan of care expiration: 4/19/2024 to 6/14/2024.     Outpatient Occupational Therapy 2 times weekly for 8 weeks to include the following interventions: Manual Therapy, Moist Heat/ Ice, Neuromuscular Re-ed, Patient Education, Self Care, Therapeutic Activities, and Therapeutic Exercise, and any other treatment modalities that will facilitate Terence Bianchi's ability to reach his goals.      Updates/Grading for next session: review hep; progress as tolerated    Nicky Wilkinson OT   5/2/2024

## 2024-05-06 ENCOUNTER — CLINICAL SUPPORT (OUTPATIENT)
Dept: REHABILITATION | Facility: HOSPITAL | Age: 64
End: 2024-05-06
Payer: MEDICARE

## 2024-05-06 DIAGNOSIS — I69.398 IMPAIRED BALANCE AS LATE EFFECT OF CEREBROVASCULAR ACCIDENT: Primary | ICD-10-CM

## 2024-05-06 DIAGNOSIS — R41.841 COGNITIVE COMMUNICATION DEFICIT: Primary | ICD-10-CM

## 2024-05-06 DIAGNOSIS — R26.89 IMPAIRED BALANCE AS LATE EFFECT OF CEREBROVASCULAR ACCIDENT: Primary | ICD-10-CM

## 2024-05-06 PROCEDURE — 97110 THERAPEUTIC EXERCISES: CPT | Mod: PO

## 2024-05-06 PROCEDURE — 97130 THER IVNTJ EA ADDL 15 MIN: CPT | Mod: PO

## 2024-05-06 PROCEDURE — 97112 NEUROMUSCULAR REEDUCATION: CPT | Mod: PO

## 2024-05-06 PROCEDURE — 97129 THER IVNTJ 1ST 15 MIN: CPT | Mod: PO

## 2024-05-06 NOTE — PROGRESS NOTES
[facility]   Physical Therapy Treatment Note      Name: Terence Bianchi  Clinic Number: [id number]    Therapy Diagnosis:   Encounter Diagnosis   Name Primary?    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 Yes       Physician: Angie Bowers FNP    Visit Date: 5/6/2024    Physician Orders: PT Eval and Treat  Medical Diagnosis from Referral:   R53.1 (ICD-10-CM) - Right sided weakness   I63.81 (ICD-10-CM) - Left sided lacunar stroke      Evaluation Date: 4/15/2024  Authorization Period Expiration: 4/10/25  Plan of Care Expiration: 5/13/24  Progress Note Due: 4/29/24  Visit # / Visits authorized: 4/ 99 (5)   FOTO: 1/5      Precautions: Standard and Fall     Time In: 8:47 am  Time Out: 9:30 am  Total Billable Time: 43 minutes    PTA Visit #: 0/5       Subjective     Pt reports: No new complaints. States he was able to mow the lawn yesterday.      He was not compliant with home exercise program.     Response to previous treatment: good  Functional change: ongoing    Pain: 0/10   Location: NA    Objective      Objective Measures updated at progress report unless specified.     Treatment     Terence received the treatments listed below:    Bold indicates progression in resistance for repetition    therapeutic exercises to develop strength, endurance, and ROM for 28 minutes including:    - Nu step Level 7.5 with bilateral upper and lower extremities for 10 minutes (rest break at 7.5 minutes)    - Shuttle leg press bilateral lower extremity 100# 3x10 at slow pace  - Shuttle leg press 62# 3x10 right lower extremity at slow pace   - Shuttle leg press 62# 3x10 left lower extremity at slow pace     (Therapeutic rest breaks throughout as needed).       Terence participated in neuromuscular re-education activities to improve: Balance and Coordination for 15 minutes. The following activities were included:     parallel bars:   - standing feet together on AirEx, chest press and OH press with  15# KB x 15 each  - balance on BOSU 3 x 30 sec with intermittent UE support  -balance on AirEx, feet together, with tennis ball toss to therapist   - single leg stance 15-20 seconds on R  - Tandem walking x 3 laps no upper extremity support on bars.      (Therapeutic rest breaks throughout as needed).       Patient Education and Home Exercises       Education provided:     - educated about importance of strength and balance. Reports understanding.     Written Home Exercises Provided: yes. Exercises were reviewed and Terence was able to demonstrate them prior to the end of the session.  Terence demonstrated good  understanding of the education provided. See EMR under Patient Instructions for exercises provided during therapy sessions    Assessment     Pt tolerated session well with focus on lower extremity strengthening and balance. Pt tolerated increased load on Nu Step and bilateral shuttle leg press today. Pt able to tolerate same weight on shuttle leg press but performed exercise at slightly greater pace on left leg. Progressed standing balance and single leg stance today with dual task training. He remains motivated and a good candidate for PT at this time.     Terence Is progressing well towards his goals.   Pt prognosis is Good.     Pt will continue to benefit from skilled outpatient physical therapy to address the deficits listed in the problem list box on initial evaluation, provide pt/family education and to maximize pt's level of independence in the home and community environment.     Pt's spiritual, cultural and educational needs considered and pt agreeable to plan of care and goals.     Anticipated barriers to physical therapy: None at this time    Goals:   Short Term Goals: 2 weeks Date last assessed:  Status:    1. Patient will be provided with an HEP for balance and strength.   4/26/24    MET   2.  Patient will demonstrate improved endurance and activity tolerance as evidenced by ability to perform Nu-step  x 15 minutes without rests breaks. 4/15/2024  Ongoing             Long Term Goals: 4 weeks  Date last assessed: Status:    1. Patient will be independent and compliant with updated HEP. 4/15/2024    Ongoing   2.  Patient will increase his   gait speed as assessed by the timed 10MWT from 1.24 m/s to 1.38 m/s to increase his safety and (I) with gait at a community level. (MDC for CVA= 0.14 m/s)     4/15/2024    Ongoing      3.Patient will improve his FOTO score from 67%  to at least 80% for improved self perception of functional mobility.(score 0-100, high score indicates greater level of function) 4/15/2024 Ongoing   4. The patient will improve his score on the  Functional Gait Assessment (FGA) from 23/30 to 28/30, indicating improved safety and (I) with dynamic,comunity level gait. (Minimal detectable change for stroke= 4.2 points)  4/15/2024    ongoing   5. Patient will improve bilateral single limb stance time from 1 second to > 10 seconds to demonstrate a reduction in fall risk.  4/15/2024    Ongoing        Plan     Plan of care Certification: 4/15/2024 to 5/13/24.     Outpatient Physical Therapy 2 times weekly for 4 weeks to include the following interventions: Gait Training, Manual Therapy, Moist Heat/ Ice, Neuromuscular Re-ed, Patient Education, Self Care, Therapeutic Activities, and Therapeutic Exercise.    Alanna Elmore, PT

## 2024-05-06 NOTE — PROGRESS NOTES
[facility]  Speech Therapy Treatment Note- Neurological Rehabilitation  Date: 5/6/2024     Name: Terence Bianchi   MRN: [id number]   Therapy Diagnosis:   No diagnosis found.    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 He stated that he purchased a notepad to write important information down during the day so he does not forget.      He was compliant to home exercise program.   Response to previous treatment: good  Pain Scale: no pain indicated throughout session  Objective:   TIMED  Procedure Min.   Cognitive Therapeutic Interventions, first 15 minutes CPT 36728  15   Cognitive Therapeutic Interventions, each additional 15 minutes CPT 68923  28           Short Term Goals: (6 weeks) Current Progress:   Patient will complete alternating attention tasks in quiet environment with 90% accuracy independently.     Progressing/ Not Met 5/6/2024   Not formally addressed this session   2. Patient will complete divided attention tasks in quiet environment with 90% accuracy independently      Progressing/ Not Met 5/6/2024   Not formally addressed this session    Met x1      3. Patient will complete a simple written task to increase verbal attention and memory (I.e. sample bill paying activity, recipe, or multiple choice comprehension questions to 1 paragraphs) with 80% accuracy.      Progressing/ Not Met 5/6/2024   Not formally addressed this session.    4. Patient will participate in Attentive Reading and  Constrained Summarization with an adequate summary including no more than 5 general words, extraneous information or pronouns to improve thought organization, verbal fluency, and efficiency in conversation.      Progressing/ Not Met 2024   Not formally addressed this session.    5. Patient will complete high level problem solving tasks with 90% accuracy independently.      Progressing/ Not Met 2024   Not formally addressed this session.    6. Patient will utilize spaced retrieval training (starting at 15 seconds) with recall of 5/5 units of information at 16 minutes with use of a timer independently.        Progressing/ Not Met 2024   Patient participated in spaced retrieval training to recall 5/5 numbers during the session  1st trial (15 seconds)-4/5 numbers correc  2nd trial (1 minute)- 4/5 numbers correct  3rd trial (3 minutes)- 4/5 numbers corrent  4th trial (5 minutes)-5/5 numbers correct     6. Patient will recall 4/4 memory strategies independently across 3 consecutive sessions.       Progressing/ Not Met 2024   Introduced and discussed WRAP (Tudgq-Qwxqmg-Lnomyjakd-Picture) strategies during start of session. Patient able to recall 4/4 strategies after 1 minute delay. Patient recalled 2/4 strategies after 5 minute delay (4/4 with minimal cues). After 10 minute delay, patient recalled 3/4 strategies. Handout provided.        Patient Education/Response:   Patient educated regarding the followin. WRAP (Jyhwo-Qoggqo-Fnerights-Picture) strategies for memory  3. Cognitive load/fatigue- cognitive breaks to improve overall cognitive fatigue    Home program established: Program modified based on patient progress  Patient verbalized understanding to all above education provided.     See Electronic Medical Record under Patient Instructions for exercises provided throughout therapy.  Assessment:   Terence participated well today in today's session which focused on memory, memory strategies and  education. Today strengths were noted in self-awareness to deficits. Patient able to state a situation in which he noticed that his memory impairments impacted him at home (needing to recall numbers to verify identity to log in to websites). Discussed WRAP (Ykzzk-Wzigem-Mmduyfful-Picture) strategies and how those strategies could be used to facilitate recall of new information. Patient stated that he has already purchased a note pad to write things down more and he utilized repetition to try to improve recall of numbers during the session. He continues to require redirection to tasks and conversations due to significantly tangential speech. Deficits remain in attention and memory. Cognitive, Physical, and Emotional fatigue was not believed to have been a barrier to the session. To date, Terence has met 0 goals.     Terence is progressing well towards his goals. Current goals remain appropriate. Goals to be updated as necessary.     Patient prognosis is Good. Patient will continue to benefit from skilled outpatient speech and language therapy to address the deficits listed in the problem list on initial evaluation, provide patient/family education and to maximize patient's level of independence in the home and community environment.   Medical necessity is demonstrated by the following IMPAIRMENTS:  Cognition: Deficits in executive functioning and memory prevent the pt from returning to work, and place his at risk of unsafe behavior and a decline in quality of life.    Cognition: Deficits in executive functioning, attention, and memory prevent the pt from relaying medically and safety relevant information in a timely manner in a state of emergency.   Barriers to Therapy: No barriers to therapy identified.  Patient's spiritual, cultural and educational needs considered and patient agreeable to plan of care and goals.  Plan:   Continue Plan of Care with focus on rehabilitation and compensation for cognitive-communication  deficits s/p left sided lacunar stroke.    Abby Moeller M.Ed., CCC-Speech Language Pathologist  Speech-Language Pathology  5/6/2024

## 2024-05-08 ENCOUNTER — OFFICE VISIT (OUTPATIENT)
Dept: NEUROLOGY | Facility: CLINIC | Age: 64
End: 2024-05-08
Payer: MEDICARE

## 2024-05-08 VITALS
BODY MASS INDEX: 26.18 KG/M2 | HEART RATE: 65 BPM | DIASTOLIC BLOOD PRESSURE: 81 MMHG | SYSTOLIC BLOOD PRESSURE: 135 MMHG | HEIGHT: 73 IN | RESPIRATION RATE: 17 BRPM | WEIGHT: 197.56 LBS

## 2024-05-08 DIAGNOSIS — I63.81 LEFT SIDED LACUNAR STROKE: Primary | ICD-10-CM

## 2024-05-08 PROBLEM — R53.1 RIGHT SIDED WEAKNESS: Status: RESOLVED | Noted: 2024-03-21 | Resolved: 2024-05-08

## 2024-05-08 PROCEDURE — 3079F DIAST BP 80-89 MM HG: CPT | Mod: CPTII,S$GLB,, | Performed by: NURSE PRACTITIONER

## 2024-05-08 PROCEDURE — 1101F PT FALLS ASSESS-DOCD LE1/YR: CPT | Mod: CPTII,S$GLB,, | Performed by: NURSE PRACTITIONER

## 2024-05-08 PROCEDURE — 99214 OFFICE O/P EST MOD 30 MIN: CPT | Mod: S$GLB,,, | Performed by: NURSE PRACTITIONER

## 2024-05-08 PROCEDURE — 3075F SYST BP GE 130 - 139MM HG: CPT | Mod: CPTII,S$GLB,, | Performed by: NURSE PRACTITIONER

## 2024-05-08 PROCEDURE — 3288F FALL RISK ASSESSMENT DOCD: CPT | Mod: CPTII,S$GLB,, | Performed by: NURSE PRACTITIONER

## 2024-05-08 PROCEDURE — 3044F HG A1C LEVEL LT 7.0%: CPT | Mod: CPTII,S$GLB,, | Performed by: NURSE PRACTITIONER

## 2024-05-08 PROCEDURE — 1159F MED LIST DOCD IN RCRD: CPT | Mod: CPTII,S$GLB,, | Performed by: NURSE PRACTITIONER

## 2024-05-08 PROCEDURE — 99999 PR PBB SHADOW E&M-EST. PATIENT-LVL III: CPT | Mod: PBBFAC,,, | Performed by: NURSE PRACTITIONER

## 2024-05-08 PROCEDURE — 3008F BODY MASS INDEX DOCD: CPT | Mod: CPTII,S$GLB,, | Performed by: NURSE PRACTITIONER

## 2024-05-08 PROCEDURE — 1160F RVW MEDS BY RX/DR IN RCRD: CPT | Mod: CPTII,S$GLB,, | Performed by: NURSE PRACTITIONER

## 2024-05-08 PROCEDURE — 1126F AMNT PAIN NOTED NONE PRSNT: CPT | Mod: CPTII,S$GLB,, | Performed by: NURSE PRACTITIONER

## 2024-05-08 NOTE — ASSESSMENT & PLAN NOTE
Presented to ED in March with persistent right side weakness and uncoordination   - out of window for TNK  CTH neg acute  MRI brain noted with acute lacunar infarct to left thalamus   - etiology small vessel  CTA without LVO; 50% right ICA stenosis  TTE without PFO; no LAE     Cont ASA QD for secondary stroke prevention  Work with PCP in controlling vascular risk factors:   - BP management; compliance discussed   - ; cont HI statin for LDL goal <70   - A1c 5.8%   - diet modification   - he has since quit smoking      Stroke education discussed

## 2024-05-08 NOTE — PATIENT INSTRUCTIONS
You have been diagnosed with a stroke, or with a TIA (transient ischemic attack). Or you have been identified as having a high risk for stroke. During a stroke, blood stops flowing to part of your brain. This can damage areas in the brain that control other parts of the body. Symptoms after a stroke depend on which part of the brain has been affected.      Stroke risk factors  Once youve had a stroke, youre at greater risk for another one. Listed below are some other factors that can increase your risk for a stroke:  High blood pressure  High cholesterol  Cigarette or cigar smoking  Diabetes  Carotid or other artery disease  Atrial fibrillation, atrial flutter, or other heart disease  Not being physically active  Obesity  Certain blood disorders (such as sickle cell anemia)  Excessive alcohol use  Abuse of street drugs  Race  Gender  Family history of stroke  Diet high in salty, fried, or greasy foods    Changes in daily living  Doing your regular tasks may be difficult after youve had a stroke, but you can learn new ways to manage your daily activities. In fact, doing daily activities may help you to regain muscle strength and bring back function to affected limbs. Be patient, give yourself time to adjust, and appreciate the progress you make.    Daily activities  You may be at risk of falling. Make changes to your home to help you walk more easily. A therapist will decide if you need an assistive device to walk safely.  You may need to see an occupational therapist or physical therapist to learn new ways of doing things. For example, you may need to make adjustments when bathing or dressing:    Tips for showering or bathing  Test the water temperature with a hand or foot that was not affected by the stroke.  Use grab bars, a shower seat, a hand-held showerhead, and a long-handled brush.    Tips for getting dressed  Dress while sitting, starting with the affected side or limb.  Wear shirts that pull easily over  your head. Wear pants or skirts with elastic waistbands.  Use zippers with loops attached to the pull tabs.    Lifestyle changes  Take your medicines exactly as directed. Dont skip doses.  Begin an exercise program. Ask your provider how to get started. Also ask how much activity you should try to get on a daily or weekly basis. You can benefit from simple activities such as walking or gardening.  Limit alcohol intake. Men should have no more than 2 alcoholic drinks a day. Women should limit themselves to 1 alcoholic drink per day.  Know your cholesterol level. Follow your providers recommendations about how to keep cholesterol under control.  If you are a smoker, quit now. Join a stop-smoking program to improve your chances of success. Ask your provider about medicines or other methods to help you quit.  Learn stress management techniques to help you deal with stress in your home and work life.    Diet  Your healthcare provider will give you information on dietary changes that you may need to make, based on your situation. Your provider may recommend that you see a registered dietitian for help with diet changes. Changes may include:  Reducing fat and cholesterol intake  Reducing salt (sodium) intake, especially if you have high blood pressure  Eating more fresh vegetables and fruits  Eating more lean proteins, such as fish, poultry, and beans and peas (legumes)  Eating less red meat and processed meats  Using low-fat dairy products  Limiting vegetable oils and nut oils  Limiting sweets and processed foods such as chips, cookies, and baked goods    Follow-up care  Keep your medical appointments. Close follow-up is important to stroke rehabilitation and recovery.  Some medicines require blood tests to check for progress or problems. Keep follow-up appointments for any blood tests ordered by your providers    When to call 911  Call 911 right away if you have any of the following symptoms of stroke:  Weakness,  tingling, or loss of feeling on one side of your face or body  Sudden double vision or trouble seeing in one or both eyes  Sudden trouble talking or slurred speech  Trouble understanding others  Sudden, severe headache  Dizziness, loss of balance, or a sense of falling  Blackouts or seizures      F.A.S.T. is an easy way to remember the signs of stroke. When you see these signs, you know that you need to call 911 fast.  F.A.S.T. stands for:  F is for face drooping. One side of the face is drooping or numb. When the person smiles, the smile is uneven.  A is for arm weakness. One arm is weak or numb. When the person lifts both arms at the same time, one arm may drift downward.  S is for speech difficulty. You may notice slurred speech or trouble speaking. The person can't repeat a simple sentence correctly when asked.  T is for time to call 911. If someone shows any of these symptoms, even if they go away, call 911 immediately. Make note of the time the symptoms first appeared.     © 5088-0142 [employer]. [address] [place] [address], [place] [address]. All rights reserved. This information is not intended as a substitute for professional medical care. Always follow your healthcare professional's instructions.

## 2024-05-08 NOTE — PROGRESS NOTES
"  NEUROLOGY  Outpatient Follow Up    [facility]  [address], [place] [address]  [phone] (office) / [phone] (fax)    Patient Name:  Terence Bianchi  :  1960  MR #:  [id number]  Acct #:  [id number]    Date of Neurology Visit: 2024  Name of Provider: SERVANDO South    Other Physicians:  Juan Ramon Villa DO (Primary Care Physician); No ref. provider found (Referring)      Chief Complaint: Follow-up      History of Present Illness (HPI):  Terence Bianchi is a right handed 63 y.o. male with a PMHX of HTN, colon polyps, and tobacco use      Patient presents today for hospital follow up. As per EMR he presented to the ED with acute right side weakness that began during the night before admit. MRI brain scan confirmed acute left thalamic stroke. He admits to not being very compliant with his BP medications.      Since discharge he has been feeling OK. He still has some weakness to his right side and difficulty writing his name. He is not in outpatient therapy.   His BP medication was changed and is now diligent about taking his medications daily.   He is not currently taking Lipitor as he didn't know the prescription was sent to his pharmacy. He is maintained on aspirin 81 mg daily. He has cut down smoking to 5 cigarettes a day. He is wanting to quit.               D/C summary 3/21/2024  "63-year-old male with hypertension, hyperlipidemia and tobacco use who presented to the ER on  with complaints of right-sided weakness.  Patient admits to being incompletely compliant with his antihypertensives.  He felt well going to sleep last night.  At about midnight he woke up to go to the bathroom as he often does because he takes diuretic.  However he noticed that he was uncoordinated and falling to his right side.  Stayed up for several hours and noticed that he had difficulty holding on to objects (he is right-handed).  He went back to sleep and woke up the morning " "with persistent symptoms.  Therefore he came to the emergency room.       In the emergency room he was normotensive.  Laboratory workup was unrevealing.  CT head was negative.  He was out of the timeframe for TNK.  NIH score was 2.  Hospital Medicine has been consulted for further evaluation management.       * No surgery found *       Hospital Course:   63-year-old gentleman with underlying history of hypertension presents with right-sided weakness and finding of new lacunar infarct.  This was confirmed on MRI.  Plan to place on aspirin, statin and resume antihypertensive regimen.  Antihypertensive regimen will be amlodipine 10 mg daily with losartan at night.  Seems to have allergy to lisinopril and intolerant to Maxzide.  Will continue Lipitor moderate dose at 40 mg and aspirin.  Will participate in outpatient therapy           Interval Hx 5/8/2024:   Patient presents today for stroke follow up. He is now participating outpatient therapies and states his symptoms are improving. He is now able to hold a pen in his right hand. He has since quit smoking cigarettes. He is compliant with his medications.         Past Medical, Surgical, Family & Social History:   Reviewed and updated.    Home Medications:     Current Outpatient Medications:     amLODIPine (NORVASC) 10 MG tablet, Take 1 tablet (10 mg total) by mouth once daily., Disp: 90 tablet, Rfl: 3    aspirin (ECOTRIN) 81 MG EC tablet, Take 1 tablet (81 mg total) by mouth once daily., Disp: , Rfl: 0    atorvastatin (LIPITOR) 40 MG tablet, Take 1 tablet (40 mg total) by mouth every evening., Disp: 90 tablet, Rfl: 3    Physical Examination:  /81 (BP Location: Left arm, Patient Position: Sitting, BP Method: Small (Automatic))   Pulse 65   Resp 17   Ht 6' 1" (1.854 m)   Wt 89.6 kg (197 lb 8.5 oz)   BMI 26.06 kg/m²     GENERAL:  General appearance: Well, non-toxic appearing.  No apparent distress.  Neck: supple.    MENTAL STATUS:  Alertness, attention span & " concentration: normal.  Language: normal.  Orientation to self, place & time:  normal.  Memory, recent & remote: normal.  Fund of knowledge: normal.      SPEECH:  Clear and fluent. Periods of slurring (questionable of this is his norm vs deficit from stroke)  Follows complex commands.    CRANIAL NERVES:  Cranial Nerves II-XII were examined.  II - Visual fields: normal.  III, IV, VI: PERRL, EOMI, No ptosis, No nystagmus.  V - Facial sensation: normal.  VII - Face symmetry & mobility: normal.  VIII - Hearing: normal  IX, X - Palate: mobile & midline.  XI - Shoulder shrug: normal.  XII - Tongue protrusion: normal.      GROSS MOTOR:  Gait & station: able to rise from chair without assistance; slightly wide based gait but could be his norm; good arm swing and step height.   Tone: normal.  Abnormal movements: none.  Finger-nose: normal.  Rapid alternating movements: normal.  Pronator drift: normal      MUSCLE STRENGTH:     RIGHT    LEFT   5 Deltoids 5   5 Sh.Ext.Rot. 5   5 Sh.Int.Rot. 5   5 Biceps 5   5 Triceps 5   5 Forearm.Pr. 5        5 Iliopsoas flex    5   5 Hip Abduct 5   5 Hip Adduct 5   5 Quads 5   5 Hams 5   5 Dorsiflex 5   5 Plantar Flex 5   5 Ankle Emre 5   5 Ankle Invert 5          REFLEXES:    RIGHT Reflex   LEFT   2 Biceps 2   2 Brachiorad. 2        1 Patellar 1         SENSORY:  Light touch: Normal throughout.             Diagnostic Data Reviewed:   I have personally reviewed provider notes, labs and imaging made available to me today.     MRI Brain Without Contrast 3/2024     Narrative  EXAMINATION:  MRI BRAIN WITHOUT CONTRAST     CLINICAL HISTORY:  Right-sided numbness, weakness.     TECHNIQUE:  Multiplanar multisequence MRI of the brain without intravenous contrast.     COMPARISON:  CT head 03/21/2024     FINDINGS:  There is 11 x 7 mm focus of restricted diffusion within the left thalamus compatible with acute lacunar infarct.     There is chronic involutional change.  There is chronic white matter  microischemic change.  No acute intracranial hemorrhage, extra-axial fluid collection, hydrocephalus, mass effect, or midline shift is identified.  The visualized vascular flow voids appear intact.  The visualized paranasal sinuses are clear.  The visualized mastoid air cells are clear.     Impression  1. Acute lacunar infarct of the left thalamus.  2. Epic message was sent to Dr. Manriquez with [facility] at 18:08 hours on 03/21/2024.          CT Head Without Contrast 3/2024     Narrative  EXAMINATION:  CT HEAD WITHOUT CONTRAST     CLINICAL HISTORY:  Neuro deficit, acute, stroke suspected;     TECHNIQUE:  Low dose axial CT images obtained throughout the head without intravenous contrast. Sagittal and coronal reconstructions were performed.  Automated exposure control was utilized to limit radiation exposure to the patient.  Total DLP for this study was  635 mgycm.     COMPARISON:  No prior pertinent study is currently available.     FINDINGS:  There is no acute brain parenchymal finding.  There is no parenchymal mass, hemorrhage, edema or major vascular distribution infarct.     Mild volume loss is noted.  Findings suggestive of chronic microvascular ischemia are noted.  Heterogeneous density of the brainstem is probably secondary to both degeneration of along white matter tracts and to the hardening artifact from the skull base.  Intracranial calcific atherosclerosis is present.  This study demonstrates no acute hemorrhage. Please note the sensitivity of CT for subarachnoid hemorrhage is at best approximately 90%.     Skull/included extracranial contents: There is no displaced  fracture. Included mastoid air cells and paranasal sinuses are clear.     Impression  1. No acute intracranial abnormality.  Please note that chronic ischemic changes could obscure superimposed acute ischemia.     CTA Head and Neck (xpd) 3/2024     Narrative  EXAMINATION:  CTA HEAD AND NECK (XPD)     CLINICAL HISTORY:  Stroke.      TECHNIQUE:  Axial CTA images of the head and neck were obtained with intravenous contrast.  Coronal and sagittal reformations were obtained.  3D reconstructions were obtained.  Automated exposure control utilized to reduce radiation dose.  Total exam DLP is 503 mGy cm.  NASCET criteria was utilized for carotid artery evaluation.     COMPARISON:  CT head and MRI brain 03/21/2024     FINDINGS:  There is motion artifact.  The vertebral arteries, common carotid arteries, carotid bulbs, and cervical internal carotid arteries demonstrate no flow-limiting stenosis, aneurysm, dissection, or occlusion.  There is calcified and noncalcified plaque within the intracranial internal carotid arteries with 50% narrowing on the right.  No significant narrowing on the left.  The anterior cerebral arteries, middle cerebral arteries, posterior cerebral arteries, and basilar artery demonstrate no flow-limiting stenosis, aneurysm, dissection, or occlusion.  There is developmentally diminutive right JAVIER A1 segment noting robust left JAVIER A1 segment.  On delayed imaging the dural venous sinuses demonstrate no thrombosis.     Impression  1. Approximately 50% stenosis right internal carotid artery intracranial level.              Cardiac:  TTE 3/2024:    Left Ventricle: The left ventricle is normal in size. Normal wall thickness. There is mild concentric hypertrophy. There is low normal systolic function. Ejection fraction by visual approximation is 50%. There is normal diastolic function.    Right Ventricle: Normal right ventricular cavity size. Wall thickness is normal. Right ventricle wall motion  is normal. Systolic function is normal.    Left Atrium: Agitated saline study of the atrial septum is negative after vasalva maneuver, suggesting absence of intracardiac shunt at the atrial level. No patent foramen ovale.    Mitral Valve: There is mild regurgitation.    Pulmonary Artery: The estimated pulmonary artery systolic pressure is 21  "mmHg.    IVC/SVC: Normal venous pressure at 3 mmHg.  Labs:  Lab Results   Component Value Date    WBC 9.48 03/23/2024    HGB 16.2 03/23/2024    HCT 47.3 03/23/2024     03/23/2024    MCV 88 03/23/2024    RDW 14.3 03/23/2024     Lab Results   Component Value Date     04/30/2024    K 5.4 (H) 04/30/2024     04/30/2024    CO2 28 04/30/2024    BUN 14 04/30/2024    CREATININE 1.4 04/30/2024     04/30/2024    CALCIUM 9.8 04/30/2024    MG 2.1 03/22/2024    PHOS 4.0 03/22/2024     Lab Results   Component Value Date    PROT 7.3 04/30/2024    ALBUMIN 3.9 04/30/2024    BILITOT 0.4 04/30/2024    AST 21 04/30/2024    ALKPHOS 70 04/30/2024    ALT 27 04/30/2024     Lab Results   Component Value Date    INR 1.1 03/22/2024     Lab Results   Component Value Date    CHOL 193 03/22/2024    HDL 45 03/22/2024    LDLCALC 132.4 03/22/2024    TRIG 78 03/22/2024    CHOLHDL 23.3 03/22/2024     Lab Results   Component Value Date    HGBA1C 5.8 (H) 03/21/2024      No results found for: "JSWXXOHM99"  No results found for: "FOLATE"  Lab Results   Component Value Date    TSH 1.600 03/21/2024     No results found for: "RPR"  No results found for: "VITAMINB1"  No components found for: "HIV 1/2 AG/AB"                    Assessment and Plan:        Problem List Items Addressed This Visit          Neuro    Left sided lacunar stroke - Primary    Current Assessment & Plan     Presented to ED in March with persistent right side weakness and uncoordination   - out of window for TNK  CTH neg acute  MRI brain noted with acute lacunar infarct to left thalamus   - etiology small vessel  CTA without LVO; 50% right ICA stenosis  TTE without PFO; no LAE     Cont ASA QD for secondary stroke prevention  Work with PCP in controlling vascular risk factors:   - BP management; compliance discussed   - ; cont HI statin for LDL goal <70   - A1c 5.8%   - diet modification   - he has since quit smoking      Stroke education discussed      "                     Important to note, also  has a past medical history of BRVO (branch retinal vein occlusion), Cataract, High cholesterol, Hypertension, and Hypertensive retinopathy of both eyes.          The patient will return to clinic as needed    All questions were answered and patient is comfortable with the plan.         Thank you very much for the opportunity to assist in this patient's care.    If you have any questions or concerns, please do not hesitate to contact me at any time.      Sincerely,     SERVANDO South  [facility] [place]

## 2024-05-09 ENCOUNTER — CLINICAL SUPPORT (OUTPATIENT)
Dept: REHABILITATION | Facility: HOSPITAL | Age: 64
End: 2024-05-09
Payer: MEDICARE

## 2024-05-09 DIAGNOSIS — I69.398 IMPAIRED BALANCE AS LATE EFFECT OF CEREBROVASCULAR ACCIDENT: Primary | ICD-10-CM

## 2024-05-09 DIAGNOSIS — R26.89 IMPAIRED BALANCE AS LATE EFFECT OF CEREBROVASCULAR ACCIDENT: Primary | ICD-10-CM

## 2024-05-09 DIAGNOSIS — R41.841 COGNITIVE COMMUNICATION DEFICIT: Primary | ICD-10-CM

## 2024-05-09 PROCEDURE — 97112 NEUROMUSCULAR REEDUCATION: CPT | Mod: KX,PO

## 2024-05-09 PROCEDURE — 97130 THER IVNTJ EA ADDL 15 MIN: CPT | Mod: KX,PN

## 2024-05-09 PROCEDURE — 97110 THERAPEUTIC EXERCISES: CPT | Mod: KX,PO

## 2024-05-09 PROCEDURE — 97129 THER IVNTJ 1ST 15 MIN: CPT | Mod: KX,PN

## 2024-05-09 NOTE — PROGRESS NOTES
"  [facility]  Speech Therapy Treatment Note- Neurological Rehabilitation  Date: 5/9/2024     Name: Terence Bianchi   MRN: [id number]   Therapy Diagnosis:   Encounter Diagnosis   Name Primary?    Cognitive communication deficit Yes     Physician: Angie Bowers FNP  Physician Orders: Ambulatory Referral to Speech Therapy   Medical Diagnosis: Left sided lacunar stroke [I63.81]     Visit #/ Visits Authorized: 4/ 12  Date of Evaluation:  4/22/2024  Insurance Authorization Period: 4/26/2024 to 12/31/2024  Plan of Care Expiration Date:    7/15/2024  Extended Plan of Care:  NA   Progress Note: 5/24/2024     Time In:  0800  Time Out:  0845  Total Billable Time: 45    Precautions: Standard  Subjective:   Patient reports: He has had some moments of inattention, such as forgetting what he was going to go grab out of a room. 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     He was compliant to home exercise program.   Response to previous treatment: good  Pain Scale: no pain indicated throughout session  Objective:   TIMED  Procedure Min.   Cognitive Therapeutic Interventions, first 15 minutes CPT 82671  15   Cognitive Therapeutic Interventions, each additional 15 minutes CPT 00703  30           Short Term Goals: (6 weeks) Current Progress:   Patient will complete alternating attention tasks in quiet environment with 90% accuracy independently.     Progressing/ Not Met 5/9/2024   Not formally addressed this session   2. Patient will complete divided attention tasks in quiet environment with 90% accuracy independently      Progressing/ Not Met 5/9/2024   Not formally addressed this session    Met x1      3. Patient will complete a simple written task to increase verbal attention and memory (I.e. sample bill paying activity, recipe, or multiple choice comprehension questions to 1 paragraphs) with 80% accuracy.      Progressing/ Not Met 5/9/2024   Patient completed " the Infer from voicemail task on Constant Therapy Level 1 (30 trials) with 90% requiring 4+ repetitions. Patient cued to utilize memory strategy of write it down. Patient attempted to write information down on 3rd voicemail, shortly abandoned strategy. Patient reported that it is difficulty for him to divide his attention to auditory information while writing.    4. Patient will participate in Attentive Reading and Constrained Summarization with an adequate summary including no more than 5 general words, extraneous information or pronouns to improve thought organization, verbal fluency, and efficiency in conversation.      Progressing/ Not Met 2024   Not formally addressed this session.    5. Patient will complete high level problem solving tasks with 90% accuracy independently.      Progressing/ Not Met 2024   Not addressed in today's session.     6. Patient will utilize spaced retrieval training (starting at 15 seconds) with recall of 5/5 units of information at 16 minutes with use of a timer independently.        Progressing/ Not Met 2024   Not addressed in today's session.     6. Patient will recall 4/4 memory strategies independently across 3 consecutive sessions.       Progressing/ Not Met 2024   Patient recalled 4/4 WRAP strategies independently at the beginning of the session.    Met X1     Patient Education/Response:   Patient educated regarding the followin. WRAP (Uccyd-Nwzgeu-Yqvfylnco-Picture) strategies for memory  2. Cognitive model- attention, memory, and executive functions  3. Levels of attention.    Home program established: Program modified based on patient progress  Patient verbalized understanding to all above education provided.     See Electronic Medical Record under Patient Instructions for exercises provided throughout therapy.  Assessment:   Terence participated well today in today's session which focused on memory, memory strategies and education. Strengths noted in  self-awareness to deficits. Continued improvements noted in utilization of learned memory strategies to improve overall memory recall. Patient increased difficulty with attending to auditory information, noting increased cognitive load following sessions tasks. He continues to require redirection to tasks and conversations due to significantly tangential speech. Deficits remain in attention and memory. Cognitive, Physical, and Emotional fatigue was not believed to have been a barrier to the session. To date, Terence has met 0 goals.     Terence is progressing well towards his goals. Current goals remain appropriate. Goals to be updated as necessary.     Patient prognosis is Good. Patient will continue to benefit from skilled outpatient speech and language therapy to address the deficits listed in the problem list on initial evaluation, provide patient/family education and to maximize patient's level of independence in the home and community environment.   Medical necessity is demonstrated by the following IMPAIRMENTS:  Cognition: Deficits in executive functioning and memory prevent the pt from returning to work, and place his at risk of unsafe behavior and a decline in quality of life.    Cognition: Deficits in executive functioning, attention, and memory prevent the pt from relaying medically and safety relevant information in a timely manner in a state of emergency.   Barriers to Therapy: No barriers to therapy identified.  Patient's spiritual, cultural and educational needs considered and patient agreeable to plan of care and goals.  Plan:   Continue Plan of Care with focus on rehabilitation and compensation for cognitive-communication deficits s/p left sided lacunar stroke.    Chantel Cohn M.S., Provisional SLP, CF-SLP  Speech Language Pathologist Resident  5/9/2024

## 2024-05-09 NOTE — PROGRESS NOTES
"[facility]   Physical Therapy Treatment Note      Name: Terence Bianchi  Clinic Number: [id number]    Therapy Diagnosis:   Encounter Diagnosis   Name Primary?    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 Yes       Physician: Angie Bowers, [place]    Visit Date: 5/9/2024    Physician Orders: PT Eval and Treat  Medical Diagnosis from Referral:   R53.1 (ICD-10-CM) - Right sided weakness   I63.81 (ICD-10-CM) - Left sided lacunar stroke      Evaluation Date: 4/15/2024  Authorization Period Expiration: 4/10/25  Plan of Care Expiration: 5/13/24  Progress Note Due: 4/29/24  Visit # / Visits authorized: 5/ 99 (6)   FOTO: 1/5      Precautions: Standard and Fall     Time In: 9:33 am   Time Out: 10:15 am   Total Billable Time: 42 minutes    PTA Visit #: 0/5       Subjective     Pt reports: " I am doing ok. A little run down today."      He was not compliant with home exercise program.     Response to previous treatment: good  Functional change: ongoing    Pain: 0/10    Location: NA    Objective      Objective Measures updated at progress report unless specified.     Treatment     Terence received the treatments listed below:    Bold indicates progression in resistance for repetition    therapeutic exercises to develop strength, endurance, and ROM for 15 minutes including:    - Nu step Level 6 with bilateral upper extremity / lower extremity 15 minutes       (Therapeutic rest breaks throughout as needed).       Terence participated in neuromuscular re-education activities to improve: Balance and Coordination for 27 minutes. The following activities were included:    Parallel bars:   - single leg stance on each lower extremity 5-8 seconds x 10 trials each.   - tandem walking x 6 laps   - tandem walking backwards x 6 laps   - [place] step forward step behind sidestepping x 6 laps   -standing on round side of BOSU 30 sec x 3 trials with occasional bilateral upper extremity support on bars " for balance.   - forward stepping to round side BOSU 2x10 each lower extremity  - lateral stepping to BOSU 2x10 each lower extremity        (Therapeutic rest breaks throughout as needed).         Patient Education and Home Exercises       Education provided:     - educated about safety with balance. Reports understanding.       Written Home Exercises Provided: yes. Exercises were reviewed and Terence was able to demonstrate them prior to the end of the session.  Terence demonstrated good  understanding of the education provided. See EMR under Patient Instructions for exercises provided during therapy sessions    Assessment     Pt tolerated session well with focus on endurance and higher level balance. Pt reports he feels he is doing well at home and unable to tell therapist anything he is still having difficulty with. Plan to reassess next session for POC.      Terence Is progressing well towards his goals.   Pt prognosis is Good.     Pt will continue to benefit from skilled outpatient physical therapy to address the deficits listed in the problem list box on initial evaluation, provide pt/family education and to maximize pt's level of independence in the home and community environment.     Pt's spiritual, cultural and educational needs considered and pt agreeable to plan of care and goals.     Anticipated barriers to physical therapy: None at this time    Goals:   Short Term Goals: 2 weeks Date last assessed:  Status:    1. Patient will be provided with an HEP for balance and strength.   4/26/24    MET   2.  Patient will demonstrate improved endurance and activity tolerance as evidenced by ability to perform Nu-step x 15 minutes without rests breaks. 5/9/24  MET             Long Term Goals: 4 weeks  Date last assessed: Status:    1. Patient will be independent and compliant with updated HEP. 4/15/2024    Ongoing   2.  Patient will increase his   gait speed as assessed by the timed 10MWT from 1.24 m/s to 1.38 m/s to  increase his safety and (I) with gait at a community level. (MDC for CVA= 0.14 m/s)     4/15/2024    Ongoing      3.Patient will improve his FOTO score from 67%  to at least 80% for improved self perception of functional mobility.(score 0-100, high score indicates greater level of function) 4/15/2024 Ongoing   4. The patient will improve his score on the  Functional Gait Assessment (FGA) from 23/30 to 28/30, indicating improved safety and (I) with dynamic,comunity level gait. (Minimal detectable change for stroke= 4.2 points)  4/15/2024    ongoing   5. Patient will improve bilateral single limb stance time from 1 second to > 10 seconds to demonstrate a reduction in fall risk.  4/15/2024    Ongoing        Plan     Plan of care Certification: 4/15/2024 to 5/13/24.     Outpatient Physical Therapy 2 times weekly for 4 weeks to include the following interventions: Gait Training, Manual Therapy, Moist Heat/ Ice, Neuromuscular Re-ed, Patient Education, Self Care, Therapeutic Activities, and Therapeutic Exercise.    Dominique Roy, PT

## 2024-05-13 ENCOUNTER — CLINICAL SUPPORT (OUTPATIENT)
Dept: REHABILITATION | Facility: HOSPITAL | Age: 64
End: 2024-05-13
Payer: MEDICARE

## 2024-05-13 DIAGNOSIS — I69.398 IMPAIRED BALANCE AS LATE EFFECT OF CEREBROVASCULAR ACCIDENT: Primary | ICD-10-CM

## 2024-05-13 DIAGNOSIS — R41.841 COGNITIVE COMMUNICATION DEFICIT: Primary | ICD-10-CM

## 2024-05-13 DIAGNOSIS — R26.89 IMPAIRED BALANCE AS LATE EFFECT OF CEREBROVASCULAR ACCIDENT: Primary | ICD-10-CM

## 2024-05-13 PROCEDURE — 97130 THER IVNTJ EA ADDL 15 MIN: CPT | Mod: PO

## 2024-05-13 PROCEDURE — 97129 THER IVNTJ 1ST 15 MIN: CPT | Mod: PO

## 2024-05-13 PROCEDURE — 97110 THERAPEUTIC EXERCISES: CPT | Mod: KX,PO

## 2024-05-13 PROCEDURE — 97530 THERAPEUTIC ACTIVITIES: CPT | Mod: PO

## 2024-05-13 NOTE — PLAN OF CARE
"[facility]   Physical Therapy Discharge Summary      Name: Terence Bianchi  [facility] Number: [id number]    Therapy Diagnosis:   Encounter Diagnosis   Name Primary?    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 Yes       Physician: Angie Bowers, [place]    Visit Date: 5/13/2024    Physician Orders: PT Eval and Treat  Medical Diagnosis from Referral:   R53.1 (ICD-10-CM) - Right sided weakness   I63.81 (ICD-10-CM) - Left sided lacunar stroke      Evaluation Date: 4/15/2024  Authorization Period Expiration: 4/10/25  Plan of Care Expiration: 5/13/24    Progress Note Due: 4/29/24  Visit # / Visits authorized: 5/ 99 (6)   FOTO: 1/5      Precautions: Standard and Fall     Time In: 8:50 am    Time Out: 9:20 am   Total Billable Time: 30 minutes    PTA Visit #: 0/5       Subjective     Pt reports: " I am doing good. Nothing new."      He was not compliant with home exercise program.     Response to previous treatment: good  Functional change: ongoing    Pain: 0/10      Location: NA    Objective      Objective Measures updated at progress report unless specified.     Treatment     Terence received the treatments listed below:    Bold indicates progression in resistance for repetition    therapeutic exercises to develop strength, endurance, and ROM for 15 minutes including:    - sci fit Level 6 with bilateral upper extremity / lower extremity 15 minutes     (Therapeutic rest breaks throughout as needed).       therapeutic activities to improve functional performance for 15  minutes, including:         Evaluation 5/13/24   Single Limb Stance right  lower extremity  1 sec  (<10 sec = HIGH FALL RISK) 14 sec   Single Limb Stance Left  lower extremity  1 sec  (<10 sec = HIGH FALL RISK) 15 sec   5 times sit to stand 9 seconds, no upper extremity support  Not tested at this time   TUG 8.19 seconds, no assistive device Not tested at this time.   Self selected walking speed (10MWT) 1.24 m/sec " (6m/4.82s) , No Assistive Device 1.22 m/s (6m/ 4.9 sec)   Functional Gait Assessment  23/30 No Assistive Device  29/30       Functional Gait Assessment:   1. Gait on level surface =  3   (3) Normal: less than 5.5 sec, no A.D., no imbalance, normal gait pattern, deviates< 6in   (2) Mild impairment: 7-5.6 sec, uses A.D., mild gait deviations, or deviates 6-10 in   (1) Moderate impairment: > 7 sec, slow speed, imbalance, deviates 10-15 in.   (0) Severe impairment: needs assist, deviates >15 in, reach/touch wall  2. Change in Gait Speed = 3   (3) Normal: smooth change w/o loss of balance or gait deviation, deviates < 6 in, significant difference between speeds   (2) Mild impairment: changes speed, but demonstrates mild gait deviations, deviates 6-10 in, OR no deviations but unable to significantly speed, OR uses A.D.   (1) Moderate impairment: minor changes to speed, OR changes speed w/ significant deviations, deviates 10-15 in, OR  Changes speed , but loses balance & recovers   (0) Severe impairment: cannot change speed, deviates >15 in, or loses balance & needs assist  3. Gait with horizontal head turns  = 3   (3) Normal: no change in gait, deviates <6 in   (2) Mild impairment: slight change in speed, deviates 6-10 in, OR uses A.D.   (1) Moderate impairment: moderate change in speed, deviates 10-15 in   (0) Severe impairment: severe disruption of gait, deviates >15in  4. Gait with vertical head turns = 3   (3) Normal: no change in gait, deviates <6 in   (2) Mild impairment: slight change in speed, deviates 6-10 in OR uses A.D.   (1) Moderate impairment: moderate change in speed, deviates 10-15 in   (0) Severe impairment: severe disruption of gait, deviates >15 in  5. Gait with pivot turns = 3   (3) Normal: performs safely in 3 sec, no LOB   (2) Mild impairment: performs in >3 sec & no LOB, OR turns safely & requires several steps to regain LOB   (1) Moderate impairment: turns slow, OR requires several small steps for  balance following turn & stop   (0) Severe impairment: cannot turn safely, needs assist  6. Step over obstacle = 3   (3) Normal: steps over 2 stacked boxes w/o change in speed or LOB   (2) Mild impairment: able to step over 1 box w/o change in speed or LOB   (1) Moderate impairment: steps over 1 box but must slow down, may require VC   (0) Severe impairment: cannot perform w/o assist  7. Gait with Narrow MIRI = 2   (3) Normal: 10 steps no staggering   (2) Mild impairment: 7-9 steps   (1) Moderate impairment: 4-7 steps   (0) Severe impairment: < 4 steps or cannot perform w/o assist  8. Gait with eyes closed = 3   (3) Normal: < 7 sec, no A.D., no LOB, normal gait pattern, deviates <6 in   (2) Mild impairment: 7.1-9 sec, mild gait deviations, deviates 6-10 in   (1) Moderate impairment: > 9 sec, abnormal pattern, LOB, deviates 10-15 in   (0) Severe impairment: cannot perform w/o assist, LOB, deviates >15in  9. Ambulating Backwards = 3   (3) Normal: no A.D., no LOB, normal gait pattern, deviates <6in   (2) Mild impairment: uses A.D., slower speed, mild gait deviations, deviates 6-10 in   (1) Moderate impairment: slow speed, abnormal gait pattern, LOB, deviates 10-15 in   (0) Severe impairment: severe gait deviations or LOB, deviates >15in  10. Steps = 3   (3) Normal: alternating feet, no rail   (2) Mild Impairment: alternating feet, uses rail   (1) Moderate impairment: step-to, uses rail   (0) Severe impairment: cannot perform safely    Score 29/30     Score:   <22/30 fall risk   <20/30 fall risk in older adults   <18/30 fall risk in Parkinsons       (Therapeutic rest breaks throughout as needed).         Patient Education and Home Exercises       Education provided:     - educated about progress with therapy. Educated about continuing at home and joining       Written Home Exercises Provided: yes. Exercises were reviewed and Terence was able to demonstrate them prior to the end of the session.  Terence demonstrated good   understanding of the education provided. See EMR under Patient Instructions for exercises provided during therapy sessions    Assessment     Pt tolerated session well. Plan of Care Update today to reassess progress towards goals. Pt has met 2/2 short term goals and 4/5 long term goals. Pt has progressed in all aspects of functional mobility. He is no longer in a fall risk category with single leg stance bilaterally and with the functional gait assessment test. He reports he feels he is back to baseline with PT at this point. Recommending pt continue with HEP and join an appropriate fitness center to continue with fitness. Pt is appropriate for discharge at this time from outpatient PT.       Pt's spiritual, cultural and educational needs considered and pt agreeable to plan of care and goals.     Anticipated barriers to physical therapy: None at this time    Goals:      Short Term Goals: 2 weeks Date last assessed:  Status:    1. Patient will be provided with an HEP for balance and strength.   4/26/24    MET   2.  Patient will demonstrate improved endurance and activity tolerance as evidenced by ability to perform Nu-step x 15 minutes without rests breaks. 5/9/24  MET             Long Term Goals: 4 weeks  Date last assessed: Status:    1. Patient will be independent and compliant with updated HEP. 5/13/24    MET   2.  Patient will increase his   gait speed as assessed by the timed 10MWT from 1.24 m/s to 1.38 m/s to increase his safety and (I) with gait at a community level. (MDC for CVA= 0.14 m/s)     5/13/24    NOT MET   3.Patient will improve his FOTO score from 67%  to at least 80% for improved self perception of functional mobility.(score 0-100, high score indicates greater level of function) 5/13/24 MET   4. The patient will improve his score on the  Functional Gait Assessment (FGA) from 23/30 to 28/30, indicating improved safety and (I) with dynamic,comunity level gait. (Minimal detectable change for stroke=  4.2 points)  5/13/24      MET   5. Patient will improve bilateral single limb stance time from 1 second to > 10 seconds to demonstrate a reduction in fall risk.  5/13/24    MET        Plan     Pt is appropriate for discharge from outpatient PT at this time with HEP and recommending pt join a fitness center to continue with progress.     Dominique Roy, PT

## 2024-05-13 NOTE — PROGRESS NOTES
[facility]  Speech Therapy Treatment Note- Neurological Rehabilitation  Date: 5/13/2024     Name: Terence Bianchi   MRN: [id number]   Therapy Diagnosis:   Cognitive Communication Deficit    Physician: Angie Bowers FNP  Physician Orders: Ambulatory Referral to Speech Therapy   Medical Diagnosis: Left sided lacunar stroke [I63.81]     Visit #/ Visits Authorized: 5/ 12  Date of Evaluation:  4/22/2024  Insurance Authorization Period: 4/26/2024 to 12/31/2024  Plan of Care Expiration Date:    7/15/2024  Extended Plan of Care:  NA   Progress Note: 5/24/2024     Time In:  0800  Time Out:  0845  Total Billable Time: 45    Precautions: Standard  Subjective:   Patient reports: He has had some moments of difficulty with attention and memory at times but stated that he raymond well and does not get frustrated.     He was compliant to home exercise program.   Response to previous treatment: good  Pain Scale: no pain indicated throughout session  Objective:   TIMED  Procedure Min.   Cognitive Therapeutic Interventions, first 15 minutes CPT 78646  15   Cognitive Therapeutic Interventions, each additional 15 minutes CPT 45562  30           Short Term Goals: (6 weeks) Current Progress:   Patient will complete alternating attention tasks in quiet environment with 90% accuracy independently.     Progressing/ Not Met 5/13/2024   Patient completed alternating attention task requiring him to write the correct letter that corresponded to a number on the page to reveal a message with 100% accuracy independently. Patient required increased time for processing toward end of worksheet.    Patient completed alternating attention task with simple calculations and written word problems with minimal cues to attend to whether the calculation was addition or subtraction. Sans cues, patient completed the task with 80% accuracy. With cues, performance improved to 100%.      Patient struggled with last alternating attention task at  end of session which required alternating drawing lines from different animals and keeping track of which animal came next. Suspect cognitive fatigue played a role in patient's difficulty. Patient was able to generate a plan as to how he would approach the task in the next session with minimal cues.          2. Patient will complete divided attention tasks in quiet environment with 90% accuracy independently      Progressing/ Not Met 2024   Not formally addressed this session    Met x1      3. Patient will complete a simple written task to increase verbal attention and memory (I.e. sample bill paying activity, recipe, or multiple choice comprehension questions to 1 paragraphs) with 80% accuracy.      Progressing/ Not Met 2024   Not formally addressed this session   4. Patient will participate in Attentive Reading and Constrained Summarization with an adequate summary including no more than 5 general words, extraneous information or pronouns to improve thought organization, verbal fluency, and efficiency in conversation.      Progressing/ Not Met 2024   Not formally addressed this session.    5. Patient will complete high level problem solving tasks with 90% accuracy independently.      Progressing/ Not Met 2024   Not addressed in today's session.     6. Patient will utilize spaced retrieval training (starting at 15 seconds) with recall of 5/5 units of information at 16 minutes with use of a timer independently.        Progressing/ Not Met 2024   Not addressed in today's session.     6. Patient will recall 4/4 memory strategies independently across 3 consecutive sessions.       Progressing/ Not Met 2024   Patient recalled 4/4 WRAP strategies independently at the beginning of the session.    Met X2     Patient Education/Response:   Patient educated regarding the followin. WRAP (Ztwoc-Tplosu-Wocumhjzo-Picture) strategies for memory  2. Cognitive model- attention, memory, and executive  functions  3. Cognitive fatigue and taking cognitive rest breaks      Home program established: Program modified based on patient progress  Patient verbalized understanding to all above education provided.     See Electronic Medical Record under Patient Instructions for exercises provided throughout therapy.  Assessment:   Terence participated well today in today's session which focused on attention, memory strategies and education. Patient more self-aware to struggles in attention during session, but at times, downplayed difficulty. Cognitive fatigue was suspected to be a factor toward end of session with last alternating attention task. He continues to require redirection to tasks and conversations due to significantly tangential speech. Deficits remain in attention and memory. To date, Terence has met 0 goals.     Terence is progressing well towards his goals. Current goals remain appropriate. Goals to be updated as necessary.     Patient prognosis is Good. Patient will continue to benefit from skilled outpatient speech and language therapy to address the deficits listed in the problem list on initial evaluation, provide patient/family education and to maximize patient's level of independence in the home and community environment.   Medical necessity is demonstrated by the following IMPAIRMENTS:  Cognition: Deficits in executive functioning and memory prevent the pt from returning to work, and place his at risk of unsafe behavior and a decline in quality of life.    Cognition: Deficits in executive functioning, attention, and memory prevent the pt from relaying medically and safety relevant information in a timely manner in a state of emergency.   Barriers to Therapy: No barriers to therapy identified.  Patient's spiritual, cultural and educational needs considered and patient agreeable to plan of care and goals.  Plan:   Continue Plan of Care with focus on rehabilitation and compensation for cognitive-communication  deficits s/p left sided lacunar stroke.    Abby Moeller M.Ed., CCC-SLP  Speech Language Pathologist   5/13/2024

## 2024-05-16 ENCOUNTER — CLINICAL SUPPORT (OUTPATIENT)
Dept: REHABILITATION | Facility: HOSPITAL | Age: 64
End: 2024-05-16
Payer: MEDICARE

## 2024-05-16 DIAGNOSIS — R27.8 COORDINATION IMPAIRMENT: Primary | ICD-10-CM

## 2024-05-16 DIAGNOSIS — R41.841 COGNITIVE COMMUNICATION DEFICIT: Primary | ICD-10-CM

## 2024-05-16 PROCEDURE — 97110 THERAPEUTIC EXERCISES: CPT | Mod: PO

## 2024-05-16 PROCEDURE — 97129 THER IVNTJ 1ST 15 MIN: CPT | Mod: KX,PO

## 2024-05-16 PROCEDURE — 97130 THER IVNTJ EA ADDL 15 MIN: CPT | Mod: KX,PO

## 2024-05-16 NOTE — PROGRESS NOTES
"  [facility]  Occupational Therapy Treatment Note     Date: 5/16/2024  Name: Terence Bianchi  Clinic Number: [id number]    Therapy Diagnosis:   Encounter Diagnosis   Name Primary?    Coordination impairment Yes     Physician: Angie Bowers FNP    Physician Orders: Eval and Treat  Medical Diagnosis:   R53.1 (ICD-10-CM) - Right sided weakness   I63.81 (ICD-10-CM) - Left sided lacunar stroke      Evaluation Date: 4/19/2024  Progress Note(s) Due: 5/17/2024  Plan of Care Certification Period: 6/14/2024  Insurance Authorization Period Expiration: 12/31/2024  Date of Return to MD: tba  Visit # / Visits authorized: 2 / 20 (+eval)  FOTO: 1/ 3; last assessed 4/19/2024           Precautions:  Standard     Time In: 8:07 AM (pt in restroom)   Time Out: 8:45 AM  Total Billable Time: 38 minutes    Subjective     Patient reports: no concerns today.     He was  semi compliant with home exercise program given last session.   Response to previous treatment: no concerns from eval  Functional change:  no significant change compared to previous session per pt report     Pain: 0/10, pt reported soreness at the end of the session   Location:  no pain reported     Objective     Objective Measures updated at progress report unless specified.    At 8:08 AM, BP=160/96, HR=75, SpO2=97%. (Pt was talking)     Treatment     Terence received the treatments listed below:      therapeutic exercises to develop strength, endurance, ROM, and flexibility for 38 minutes including:    See vitals above     AROM right shoulder flexion, extension, abduction, internal rotation, external rotation     -UBE x5 min forward/ 5 min back w/ tristen UE, level 2.5, seated to increase tristen UE act nakul and UB strength, as well as improving cardiovascular fitness. He was encouraged to keep rate of perceived exertion (RPE) at "easy to moderate" (3-4/10) and/ or keeping METs (noted on monitor) at 2.5 or better. Alcantara avg=35, peak alcantara=43. Pt's " reported RPE= 3/10.     Maharaj theraband exercises for UE strength              -shoulder flexion 2x10, bilaterally               -shoulder abduction 2x10, bilaterally               -external rotation 2x10, bilaterally    -internal rotation 2x10,  bilaterally    -row 2x10, bilaterally    -shoulder extension,  2x10 bilaterally     manual therapy techniques were applied for 0 minutes, including: NA    Patient Education and Home Exercises     Education provided:   - positioning of shoulder anatomy during exercises  - Progress towards goals     Written Home Exercises Provided: yes.  Exercises were reviewed and Terence was able to demonstrate them prior to the end of the session.  Terence demonstrated good  understanding of the home exercise program provided. See electronic medical record under Patient Instructions for exercises provided during therapy sessions.       Assessment     Terence tolerated his session well. Pt completed UBE with little difficulty and no pain reported. Maharaj theraband exercise completed bilaterally for increased shoulder strength. He required verbal cues for controlling movement, form, and positioning of upper extremities.     Terence is progressing well towards his goals and there are no updates to goals at this time. Pt prognosis is Good.     Patient will continue to benefit from skilled outpatient occupational therapy to address the deficits listed in the problem list on initial evaluation provide patient/family education and to maximize patient's level of independence in the home and community environment.     Patient's spiritual, cultural and educational needs considered and patient agreeable to plan of care and goals.    Anticipated barriers to occupational therapy: distance to therapy    Goals:  Short Term Goals (4 weeks) Status When Last Assessed  Progressing/ Met   1) patient to be independent with initial HEP   (progressing 5/16/2024)     2) patient to increase right shoulder strength to 4/5  flexion & 4-/5 external rotation to increase independence with overhead activities Strength 4/19/24   **within available ROM** Right    Shoulder flex 4-/5   Shoulder abd 4/5   Shoulder ER 3+/5   Shoulder IR 5/5   Shoulder Extension 5/5   Shoulder Horizontal adduction 4/5   Elbow flex 4+/5   Elbow ext 4/5   Wrist flex 4+/5   Wrist ext 4+/5   Supination 4+/5   Pronation 4+/5    (progressing 5/16/2024)     3) patient to improve right upper extremity fine motor coordination (FMC) with a 9-Hole Peg Test time of 26s or better (9HPT) Right  Left      4/19/24 28s 28s    (progressing 5/16/2024)           LongTerm Goals (8 weeks) Status When Last Assessed  Progressing/ Met   1) patient to increase right shoulder strength to 4+/5 flexion & abduction and & 4/5 (or better) external rotation to increase independence with overhead activities Strength 4/19/24   **within available ROM** Right    Shoulder flex 4-/5   Shoulder abd 4/5   Shoulder ER 3+/5   Shoulder IR 5/5   Shoulder Extension 5/5   Shoulder Horizontal adduction 4/5   Elbow flex 4+/5   Elbow ext 4/5   Wrist flex 4+/5   Wrist ext 4+/5   Supination 4+/5   Pronation 4+/5    (progressing 5/16/2024)     2) patient to improve right upper extremity fine motor coordination (FMC) with a 9-Hole Peg Test time of 24s or better  (progressing 5/16/2024)     3) patient will be able to sign name without difficulty x5 trials in one session Difficult on eval per patient report. (progressing 5/16/2024)        Additional functional goals to be added as pt progresses and per his priorities.      Plan   Certification Period/Plan of care expiration: 4/19/2024 to 6/14/2024.     Outpatient Occupational Therapy 2 times weekly for 8 weeks to include the following interventions: Manual Therapy, Moist Heat/ Ice, Neuromuscular Re-ed, Patient Education, Self Care, Therapeutic Activities, and Therapeutic Exercise, and any other treatment modalities that will facilitate Terence Bianchi's ability to reach  his goals.      Updates/Grading for next session: review hep; progress as tolerated    Lucretia Arellano OT   5/16/2024

## 2024-05-16 NOTE — PROGRESS NOTES
[facility]  Speech Therapy Treatment Note- Neurological Rehabilitation  Date: 5/16/2024     Name: Terence Bianchi   MRN: [id number]   Therapy Diagnosis:   Cognitive Communication Deficit    Physician: Angie Bowers FNP  Physician Orders: Ambulatory Referral to Speech Therapy   Medical Diagnosis: Left sided lacunar stroke [I63.81]     Visit #/ Visits Authorized: 6/ 12  Date of Evaluation:  4/22/2024  Insurance Authorization Period: 4/26/2024 to 12/31/2024  Plan of Care Expiration Date:    7/15/2024  Extended Plan of Care:  NA   Progress Note: 5/24/2024     Time In:  0845  Time Out:  0930  Total Billable Time: 45    Precautions: Standard  Subjective:   Patient reports: continues to independently use strategies to improve memory and attention at home      He was compliant to home exercise program.   Response to previous treatment: good  Pain Scale: no pain indicated throughout session  Objective:   TIMED  Procedure Min.   Cognitive Therapeutic Interventions, first 15 minutes CPT 26227  15   Cognitive Therapeutic Interventions, each additional 15 minutes CPT 26064  30           Short Term Goals: (6 weeks) Current Progress:   Patient will complete alternating attention tasks in quiet environment with 90% accuracy independently.     Progressing/ Not Met 5/16/2024   Patient completed alternating attention task which required alternating drawing lines from different animals and keeping track of which animal came next. Patient completed with 65% accuracy independently, 90% accuracy with verbal cues to improve alternating attention and thought organization. Suspect cognitive fatigue played a role in patient's difficulty. Patient needed frequent repetitions at start of task of how to implement strategies in task to improve participation and accurate completion          2. Patient will complete divided attention tasks in quiet environment with 90% accuracy independently      Progressing/ Not Met 5/16/2024    Patient completed divided attention task connecting letters/numbers in an alternating and ascending order with 70% accuracy independently.     Met x1      3. Patient will complete a simple written task to increase verbal attention and memory (I.e. sample bill paying activity, recipe, or multiple choice comprehension questions to 1 paragraphs) with 80% accuracy.      Progressing/ Not Met 2024   Not formally addressed this session   4. Patient will participate in Attentive Reading and Constrained Summarization with an adequate summary including no more than 5 general words, extraneous information or pronouns to improve thought organization, verbal fluency, and efficiency in conversation.      Progressing/ Not Met 2024   Not formally addressed this session.    5. Patient will complete high level problem solving tasks with 90% accuracy independently.      Progressing/ Not Met 2024   Patient initiated written deductive reasoning puzzle #2 with 70% accuracy independently. Patient asked to complete the remainder at home and return next speech therapy session.      6. Patient will utilize spaced retrieval training (starting at 15 seconds) with recall of 5/5 units of information at 16 minutes with use of a timer independently.        Progressing/ Not Met 2024   Not addressed in today's session.     6. Patient will recall 4/4 memory strategies independently across 3 consecutive sessions.      Patient recalled 4/4 WRAP strategies independently at the beginning of the session.    GOAL MET 2024     Patient Education/Response:   Patient educated regarding the followin. WRAP (Frmxe-Quntqu-Gznddhagz-Picture) strategies for memory  2. Cognitive model- attention, memory, and executive functions  3. Cognitive fatigue and taking cognitive rest breaks  4. Strategies to use across tasks to improve with alternating attention and thought organization.       Home program established: Program modified based on  patient progress  Patient verbalized understanding to all above education provided.     See Electronic Medical Record under Patient Instructions for exercises provided throughout therapy.  Assessment:   Terence participated well today in today's session which focused on attention, memory strategies and education. Patient more self-aware to struggles in attention during session, but at times, downplayed difficulty. Cognitive fatigue was suspected to be a factor toward end of session with last alternating attention task. He continues to require redirection to tasks and conversations due to significantly tangential speech. Patient needed multiple repetitions at start of tasks to effectively use strategies discussed - once repetitions provided, patient completed task with minimal assist needed to improve alternating attention and thought organization. Deficits remain in attention and memory. To date, Terence has met 1 goal.     Terence is progressing well towards his goals. Current goals remain appropriate. Goals to be updated as necessary.     Patient prognosis is Good. Patient will continue to benefit from skilled outpatient speech and language therapy to address the deficits listed in the problem list on initial evaluation, provide patient/family education and to maximize patient's level of independence in the home and community environment.   Medical necessity is demonstrated by the following IMPAIRMENTS:  Cognition: Deficits in executive functioning and memory prevent the pt from returning to work, and place his at risk of unsafe behavior and a decline in quality of life.    Cognition: Deficits in executive functioning, attention, and memory prevent the pt from relaying medically and safety relevant information in a timely manner in a state of emergency.   Barriers to Therapy: No barriers to therapy identified.  Patient's spiritual, cultural and educational needs considered and patient agreeable to plan of care and  goals.  Plan:   Continue Plan of Care with focus on rehabilitation and compensation for cognitive-communication deficits s/p left sided lacunar stroke.    ORTEGA Grissom, CCC-SLP, CBIS  Speech-Language Pathology  5/16/2024

## 2024-05-22 NOTE — PROGRESS NOTES
[facility]  Speech Therapy Treatment Note- Neurological Rehabilitation  Date: 5/23/2024     Name: Terence Bianchi   MRN: [id number]   Therapy Diagnosis:   Cognitive Communication Deficit    Physician: Angie Bowers FNP  Physician Orders: Ambulatory Referral to Speech Therapy   Medical Diagnosis: Left sided lacunar stroke [I63.81]     Visit #/ Visits Authorized: 7/ 12  Date of Evaluation:  4/22/2024  Insurance Authorization Period: 4/26/2024 to 12/31/2024  Plan of Care Expiration Date:    7/15/2024  Extended Plan of Care:  NA   Progress Note: 5/24/2024     Time In:  0800  Time Out:  0845  Total Billable Time: 45    Precautions: Standard  Subjective:   Patient reports: New insight into his attention deficits realizing he does not do as well being pulled in many different directions. Reported wife doesn't fully understand. He reports occasional word finding deficits in conversation.      He was compliant to home exercise program.   Response to previous treatment: good  Pain Scale: no pain indicated throughout session  Objective:   TIMED  Procedure Min.   Cognitive Therapeutic Interventions, first 15 minutes CPT 29297  15   Cognitive Therapeutic Interventions, each additional 15 minutes CPT 17811  30           Short Term Goals: (6 weeks) Current Progress:   Patient will complete alternating attention tasks in quiet environment with 90% accuracy independently.     Progressing/ Not Met 5/23/2024   Patient completed alternating attention task switching between 2 symbols on a scanning task with 30% independently and 80% with cues to be intentional.         2. Patient will complete divided attention tasks in quiet environment with 90% accuracy independently      Progressing/ Not Met 5/23/2024   Patient completed divided attention task remembering pictures in order with 63% accuracy independently. Patient was impulsive in starting the task without fully understanding the directions.  Given cues to be  intentional and fully understand the task he was 80%,    Met x1      3. Patient will complete a simple written task to increase verbal attention and memory (I.e. sample bill paying activity, recipe, or multiple choice comprehension questions to 1 paragraphs) with 80% accuracy.      Progressing/ Not Met 2024   Not formally addressed this session   4. Patient will participate in Attentive Reading and Constrained Summarization with an adequate summary including no more than 5 general words, extraneous information or pronouns to improve thought organization, verbal fluency, and efficiency in conversation.      Progressing/ Not Met 2024   Not formally addressed this session.    5. Patient will complete high level problem solving tasks with 90% accuracy independently.      Progressing/ Not Met 2024   Not addressed in today's session     6. Patient will utilize spaced retrieval training (starting at 15 seconds) with recall of 5/5 units of information at 16 minutes with use of a timer independently.        Progressing/ Not Met 2024   Not addressed in today's session.     6. Patient will recall 4/4 memory strategies independently across 3 consecutive sessions.      Patient recalled 4/4 WRAP strategies independently at the beginning of the session.    GOAL MET 2024   7. The patient will select and recall 5/10 preferred word finding strategies provided by Speech Language Pathologist  to improve word finding. Goal added today to address next session. Patient displayed word finding deficits 2 x in 30 minutes and requested tools to improve.     Patient Education/Response:   Patient educated regarding the followin.Cognitive model- attention, memory, and executive functions  2.. Cognitive fatigue and taking cognitive rest breaks  3. Strategies to use across tasks to improve with alternating attention and thought organization.   4. Word finding strategies      Home program established: Program modified  based on patient progress  Patient verbalized understanding to all above education provided.     See Electronic Medical Record under Patient Instructions for exercises provided throughout therapy.  Assessment:   Terence participated well today in today's session which focused on attention and education. Patient more self-aware to struggles in attention during session, but at times, downplayed difficulty.  He continues to require redirection to tasks and conversations due to significantly tangential speech. Patient needed multiple repetitions at start of tasks to effectively use strategies discussed - once repetitions provided, patient completed task with minimal assist needed to improve alternating attention and thought organization. Deficits remain in attention and memory. To date, Terence has met 1 goal.     Terence is progressing well towards his goals. Current goals remain appropriate. Goals to be updated as necessary.     Patient prognosis is Good. Patient will continue to benefit from skilled outpatient speech and language therapy to address the deficits listed in the problem list on initial evaluation, provide patient/family education and to maximize patient's level of independence in the home and community environment.   Medical necessity is demonstrated by the following IMPAIRMENTS:  Cognition: Deficits in executive functioning and memory prevent the pt from returning to work, and place his at risk of unsafe behavior and a decline in quality of life.    Cognition: Deficits in executive functioning, attention, and memory prevent the pt from relaying medically and safety relevant information in a timely manner in a state of emergency.   Barriers to Therapy: No barriers to therapy identified.  Patient's spiritual, cultural and educational needs considered and patient agreeable to plan of care and goals.  Plan:   Continue Plan of Care with focus on rehabilitation and compensation for cognitive-communication deficits  s/p left sided lacunar stroke.    OCTAVIO Ashley, CCC-SLP  Speech-Language Pathology  5/23/2024

## 2024-05-23 ENCOUNTER — CLINICAL SUPPORT (OUTPATIENT)
Dept: REHABILITATION | Facility: HOSPITAL | Age: 64
End: 2024-05-23
Payer: MEDICARE

## 2024-05-23 DIAGNOSIS — R27.8 COORDINATION IMPAIRMENT: Primary | ICD-10-CM

## 2024-05-23 DIAGNOSIS — R41.841 COGNITIVE COMMUNICATION DEFICIT: Primary | ICD-10-CM

## 2024-05-23 PROCEDURE — 92507 TX SP LANG VOICE COMM INDIV: CPT | Mod: PO

## 2024-05-23 PROCEDURE — 97110 THERAPEUTIC EXERCISES: CPT | Mod: 59,PO

## 2024-05-23 NOTE — PROGRESS NOTES
"  [facility]  Occupational Therapy Treatment Note     Date: 5/23/2024  Name: Terence Bianchi  Clinic Number: [id number]    Therapy Diagnosis:   Encounter Diagnosis   Name Primary?    Coordination impairment Yes     Physician: Angie Bowers FNP    Physician Orders: Eval and Treat  Medical Diagnosis:   R53.1 (ICD-10-CM) - Right sided weakness   I63.81 (ICD-10-CM) - Left sided lacunar stroke      Evaluation Date: 4/19/2024  Progress Note(s) Due: 5/17/2024  Plan of Care Certification Period: 6/14/2024  Insurance Authorization Period Expiration: 12/31/2024  Date of Return to MD: tba  Visit # / Visits authorized: 3 / 20 (+eval)  FOTO: 1/ 3; last assessed 4/19/2024           Precautions:  Standard     Time In: 7:18 AM  Time Out: 8:00 AM  Total Billable Time: 42 minutes    Subjective     Patient reports: no concerns today. Nothing new to report.     He was not compliant with home exercise program given.   Response to previous treatment: no concerns   Functional change:  no significant change compared to previous session per pt report     Pain: 0/10  Location: no pain reported     Objective     Objective Measures updated at progress report unless specified.    At 7:20 AM, NH=626/88, HR=75, SpO2=98%.     Treatment     Terence received the treatments listed below:      therapeutic exercises to develop strength, endurance, ROM, and flexibility for 42 minutes including:    See vitals above     -UBE x5 min forward/ 5 min back w/ tristen UE, level 3.5, standing to increase tristen UE act nakul and UB strength, as well as improving cardiovascular fitness. He was encouraged to keep rate of perceived exertion (RPE) at "easy to moderate" (3-4/10) and/ or keeping METs (noted on monitor) at 2.5 or better. Alcantara avg=36, peak alcantara=63. Pt's reported RPE= 3/10.     Maharaj theraband exercises for UE strength (all completed bilaterally)               -shoulder flexion 2x10               -shoulder abduction 2x10               " -external rotation 2x10    -internal rotation 2x10,   -row 2x10    -shoulder extension, 2x10      Wall slides (bilateral upper extremity) 2x10     Ed: completing HEP as directed    manual therapy techniques were applied for 0 minutes, including: NA    Patient Education and Home Exercises     Education provided:   - positioning of shoulder anatomy during exercises  - Progress towards goals     Written Home Exercises Provided: yes.  Exercises were reviewed and Terence was able to demonstrate them prior to the end of the session.  Terence demonstrated good  understanding of the home exercise program provided. See electronic medical record under Patient Instructions for exercises provided during therapy sessions.       Assessment     Terence tolerated his session well.Pt reports not completing his HEP at home, but reports he is active,  Pt completed UBE with little difficulty and no pain reported. Maharaj theraband exercise completed again bilaterally for increased shoulder strength. He required verbal cues for controlling movement, form, and positioning of upper extremities. Wall slides completed for assist and positioning of overhead reach with no difficulty.     Terence is progressing well towards his goals and there are no updates to goals at this time. Pt prognosis is Good.     Patient will continue to benefit from skilled outpatient occupational therapy to address the deficits listed in the problem list on initial evaluation provide patient/family education and to maximize patient's level of independence in the home and community environment.     Patient's spiritual, cultural and educational needs considered and patient agreeable to plan of care and goals.    Anticipated barriers to occupational therapy: distance to therapy    Goals:  Short Term Goals (4 weeks) Status When Last Assessed  Progressing/ Met   1) patient to be independent with initial HEP   (progressing 5/23/2024)     2) patient to increase right shoulder  strength to 4/5 flexion & 4-/5 external rotation to increase independence with overhead activities Strength 4/19/24   **within available ROM** Right    Shoulder flex 4-/5   Shoulder abd 4/5   Shoulder ER 3+/5   Shoulder IR 5/5   Shoulder Extension 5/5   Shoulder Horizontal adduction 4/5   Elbow flex 4+/5   Elbow ext 4/5   Wrist flex 4+/5   Wrist ext 4+/5   Supination 4+/5   Pronation 4+/5    (progressing 5/23/2024)     3) patient to improve right upper extremity fine motor coordination (FMC) with a 9-Hole Peg Test time of 26s or better (9HPT) Right  Left      4/19/24 28s 28s    (progressing 5/23/2024)           LongTerm Goals (8 weeks) Status When Last Assessed  Progressing/ Met   1) patient to increase right shoulder strength to 4+/5 flexion & abduction and & 4/5 (or better) external rotation to increase independence with overhead activities Strength 4/19/24   **within available ROM** Right    Shoulder flex 4-/5   Shoulder abd 4/5   Shoulder ER 3+/5   Shoulder IR 5/5   Shoulder Extension 5/5   Shoulder Horizontal adduction 4/5   Elbow flex 4+/5   Elbow ext 4/5   Wrist flex 4+/5   Wrist ext 4+/5   Supination 4+/5   Pronation 4+/5    (progressing 5/23/2024)     2) patient to improve right upper extremity fine motor coordination (FMC) with a 9-Hole Peg Test time of 24s or better  (progressing 5/23/2024)     3) patient will be able to sign name without difficulty x5 trials in one session Difficult on eval per patient report. (progressing 5/23/2024)        Additional functional goals to be added as pt progresses and per his priorities.      Plan   Certification Period/Plan of care expiration: 4/19/2024 to 6/14/2024.     Outpatient Occupational Therapy 2 times weekly for 8 weeks to include the following interventions: Manual Therapy, Moist Heat/ Ice, Neuromuscular Re-ed, Patient Education, Self Care, Therapeutic Activities, and Therapeutic Exercise, and any other treatment modalities that will facilitate Terence Bianchi's  ability to reach his goals.      Updates/Grading for next session: review hep; progress as tolerated    Lucretia Arellano OT   5/23/2024

## 2024-05-28 ENCOUNTER — CLINICAL SUPPORT (OUTPATIENT)
Dept: REHABILITATION | Facility: HOSPITAL | Age: 64
End: 2024-05-28
Payer: MEDICARE

## 2024-05-28 DIAGNOSIS — R41.841 COGNITIVE COMMUNICATION DEFICIT: Primary | ICD-10-CM

## 2024-05-28 DIAGNOSIS — R27.8 COORDINATION IMPAIRMENT: Primary | ICD-10-CM

## 2024-05-28 PROCEDURE — 97129 THER IVNTJ 1ST 15 MIN: CPT | Mod: PO

## 2024-05-28 PROCEDURE — 97110 THERAPEUTIC EXERCISES: CPT | Mod: PO

## 2024-05-28 PROCEDURE — 97130 THER IVNTJ EA ADDL 15 MIN: CPT | Mod: PO

## 2024-05-28 NOTE — PROGRESS NOTES
"  [facility]  Occupational Therapy Treatment Note     Date: 5/28/2024  Name: Terence Bianchi  Clinic Number: [id number]    Therapy Diagnosis:   Encounter Diagnosis   Name Primary?    Coordination impairment Yes     Physician: Angie Bowers FNP    Physician Orders: Eval and Treat  Medical Diagnosis:   R53.1 (ICD-10-CM) - Right sided weakness   I63.81 (ICD-10-CM) - Left sided lacunar stroke      Evaluation Date: 4/19/2024  Progress Note(s) Due: 5/17/2024  Plan of Care Certification Period: 6/14/2024  Insurance Authorization Period Expiration: 12/31/2024  Date of Return to MD: tba  Visit # / Visits authorized: 4 / 20 (+eval)  FOTO: 1/ 3; last assessed 4/19/2024           Precautions:  Standard     Time In: 8:16 AM (running late from ST)   Time Out: 8:32  AM  Total Billable Time: 16 minutes    Subjective     Patient reports: Doing well. He reports he is really stressed. After UBE, pt stated, " Can I be done for the day? It's been a long, hard night for me and I really want to go home."     He was not compliant with home exercise program given.   Response to previous treatment: no concerns   Functional change:  no significant change compared to previous session per pt report     Pain: 0/10  Location: no pain reported     Objective     Objective Measures updated at progress report unless specified.    At 8:17 AM, ZD=326/100 , HR=66 , SpO2=98%. (Pt talking)     141/88 at 8:21 AM with a different BP machine     Treatment     Terence received the treatments listed below:      therapeutic exercises to develop strength, endurance, ROM, and flexibility for 16 minutes including:    See vitals above (taken x2)     -UBE x5 min forward/ 5 min back w/ tristen UE, level 3.7, standing to increase tristen UE act nakul and UB strength, as well as improving cardiovascular fitness. He was encouraged to keep rate of perceived exertion (RPE) at "easy to moderate" (3-4/10) and/ or keeping METs (noted on monitor) at 2.5 or " better. Alcantara avg=28, peak alcantara=41. Pt's reported RPE= 5/10.    manual therapy techniques were applied for 0 minutes, including: NA    Patient Education and Home Exercises     Education provided:   - positioning of shoulder anatomy during exercises  - Progress towards goals     Written Home Exercises Provided: yes.  Exercises were reviewed and Terence was able to demonstrate them prior to the end of the session.  Terence demonstrated good  understanding of the home exercise program provided. See electronic medical record under Patient Instructions for exercises provided during therapy sessions.       Assessment     Terence reported that he is very stressed and did not have a good night due to personal issues with family.  After UBE, pt asked to be done for the day and head home. Pt reported the UBE to be a 5/10 today, which is higher than usual.     Terence is progressing well towards his goals and there are no updates to goals at this time. Pt prognosis is Good.     Patient will continue to benefit from skilled outpatient occupational therapy to address the deficits listed in the problem list on initial evaluation provide patient/family education and to maximize patient's level of independence in the home and community environment.     Patient's spiritual, cultural and educational needs considered and patient agreeable to plan of care and goals.    Anticipated barriers to occupational therapy: distance to therapy    Goals:  Short Term Goals (4 weeks) Status When Last Assessed  Progressing/ Met   1) patient to be independent with initial HEP   (progressing 5/28/2024)     2) patient to increase right shoulder strength to 4/5 flexion & 4-/5 external rotation to increase independence with overhead activities Strength 4/19/24   **within available ROM** Right    Shoulder flex 4-/5   Shoulder abd 4/5   Shoulder ER 3+/5   Shoulder IR 5/5   Shoulder Extension 5/5   Shoulder Horizontal adduction 4/5   Elbow flex 4+/5   Elbow ext  4/5   Wrist flex 4+/5   Wrist ext 4+/5   Supination 4+/5   Pronation 4+/5    (progressing 5/28/2024)     3) patient to improve right upper extremity fine motor coordination (FMC) with a 9-Hole Peg Test time of 26s or better (9HPT) Right  Left      4/19/24 28s 28s    (progressing 5/28/2024)           LongTerm Goals (8 weeks) Status When Last Assessed  Progressing/ Met   1) patient to increase right shoulder strength to 4+/5 flexion & abduction and & 4/5 (or better) external rotation to increase independence with overhead activities Strength 4/19/24   **within available ROM** Right    Shoulder flex 4-/5   Shoulder abd 4/5   Shoulder ER 3+/5   Shoulder IR 5/5   Shoulder Extension 5/5   Shoulder Horizontal adduction 4/5   Elbow flex 4+/5   Elbow ext 4/5   Wrist flex 4+/5   Wrist ext 4+/5   Supination 4+/5   Pronation 4+/5    (progressing 5/28/2024)     2) patient to improve right upper extremity fine motor coordination (FMC) with a 9-Hole Peg Test time of 24s or better  (progressing 5/28/2024)     3) patient will be able to sign name without difficulty x5 trials in one session Difficult on eval per patient report. (progressing 5/28/2024)        Additional functional goals to be added as pt progresses and per his priorities.      Plan   Certification Period/Plan of care expiration: 4/19/2024 to 6/14/2024.     Outpatient Occupational Therapy 2 times weekly for 8 weeks to include the following interventions: Manual Therapy, Moist Heat/ Ice, Neuromuscular Re-ed, Patient Education, Self Care, Therapeutic Activities, and Therapeutic Exercise, and any other treatment modalities that will facilitate Terence Bianchi's ability to reach his goals.      Updates/Grading for next session: review hep; progress as tolerated    Lucretia Arellano OT   5/28/2024

## 2024-05-28 NOTE — PROGRESS NOTES
"[facility]  Speech Therapy Progress Note- Neurological Rehabilitation  Date: 5/28/2024     Name: Terence Bianchi   MRN: [id number]   Therapy Diagnosis:   Cognitive Communication Deficit    Physician: Angie Bowers FNP  Physician Orders: Ambulatory Referral to Speech Therapy   Medical Diagnosis: Left sided lacunar stroke [I63.81]     Visit #/ Visits Authorized: 8/ 12  Date of Evaluation:  4/22/2024  Insurance Authorization Period: 4/26/2024 to 12/31/2024  Plan of Care Expiration Date:    7/15/2024  Extended Plan of Care:  NA   Progress Note: 5/24/2024 this session    Time In:  0715  Time Out:  0815  Total Billable Time: 45    Precautions: Standard  Subjective:   Patient reports: "I didn't feel like coming today". Patient reported wife having problems with her insulin, had to go to the emergency department. "I didn't get much sleep".      Can't focus around my wife. Patient doesn't have the attention to deal with all the extraneous information provided by his wife. Difference is I was working before.      He was compliant to home exercise program.   Response to previous treatment: good  Pain Scale: no pain indicated throughout session  Objective:   TIMED  Procedure Min.   Cognitive Therapeutic Interventions, first 15 minutes CPT 11748  15   Cognitive Therapeutic Interventions, each additional 15 minutes CPT 96698  45           Short Term Goals: (6 weeks) Current Progress:   Patient will complete alternating attention tasks in quiet environment with 90% accuracy independently.     Progressing/ Not Met 5/28/2024   Patient completed complex sustained attention task following directions (3 step) with 90%  accuracy.    Patient completed alternating attention tasks on an ipad reading a map and alternating between information with 90% accuracy after making adjustments for vision by enlarging the screen.     Goal Met x 1       2. Patient will complete divided attention tasks in quiet environment with 90% " accuracy independently      Progressing/ Not Met 5/28/2024   Met x1   3. Patient will complete a simple written task to increase verbal attention and memory (I.e. sample bill paying activity, recipe, or multiple choice comprehension questions to 1 paragraphs) with 80% accuracy.      Progressing/ Not Met 5/28/2024   Not formally addressed this session   4. Patient will participate in Attentive Reading and Constrained Summarization with an adequate summary including no more than 5 general words, extraneous information or pronouns to improve thought organization, verbal fluency, and efficiency in conversation.      Progressing/ Not Met 5/28/2024   Not formally addressed this session.    5. Patient will complete high level problem solving tasks with 90% accuracy independently.      Progressing/ Not Met 5/28/2024   Not addressed in today's session     6. Patient will utilize spaced retrieval training (starting at 15 seconds) with recall of 5/5 units of information at 16 minutes with use of a timer independently.        Progressing/ Not Met 5/28/2024   Not addressed in today's session.     6. Patient will recall 4/4 memory strategies independently across 3 consecutive sessions.      Patient recalled 4/4 WRAP strategies independently at the beginning of the session.    GOAL MET 5/16/2024         Patient Education/Response:   Patient educated regarding the following:  Problem solving sensory overload situations in the home environment  2. Cognitive fatigue and taking intentional cognitive rest breaks  3. Problem solving strategies      Home program established: Patient given assignment to come up with new solutions to sensory overload situations in the home.  Patient verbalized understanding to all above education provided.     See Electronic Medical Record under Patient Instructions for exercises provided throughout therapy.  Assessment:   Terence continues with mild cognitive deficits in attention, memory, and problem  solving, however, he reports that his deficits are not affecting him functionally except in the area of sensory overload in the home environment. Participation in cognitive tasks in Speech Therapy endorse this. He reports episodes of irritability, restlessness and difficulty focusing during periods of sensory overload in the home. The patient reports prior to his stroke he was able to cognitively deal with over stimulation, however, now his quality of life is affected by his inability to do this. These deficits in attention and self regulation given sensory overload lead to cognitive fatigue which affects his ability to attend and problem solve situations in the home in a timely manner to maintain safety and cause a decline in his quality of life.      Terence is progressing well towards his goals and is believed to be WFL in all areas of cognition except for dealing with sensory overload in the home. Formal assessment of current level of cognition to be completed next session with plan of care updated at that time.     Patient prognosis is Good. Patient will continue to benefit from skilled outpatient speech and language therapy to address the deficits in sensory overload, provide patient/family education and to maximize patient's level of independence in the home and community environment.   Medical necessity is demonstrated by the following IMPAIRMENTS:  Deficits in attention and self regulation given sensory overload lead to cognitive fatigue which affects his ability to attend and problem solve situations in the home in a timely manner to maintain safety and cause a decline in his quality of life.  Barriers to Therapy: Family support  Patient's spiritual, cultural and educational needs considered and patient agreeable to plan of care and goals.  Plan:   Continue Plan of Care with focus on rehabilitation and compensation for cognitive-communication deficits s/p left sided lacunar stroke. Formal assessment/ post  testing of current level of cognition to be completed next session with plan of care updated at that time.    OCTAVIO Ashley, CCC-SLP  Speech-Language Pathology  5/28/2024

## 2024-05-31 ENCOUNTER — CLINICAL SUPPORT (OUTPATIENT)
Dept: REHABILITATION | Facility: HOSPITAL | Age: 64
End: 2024-05-31
Payer: MEDICARE

## 2024-05-31 DIAGNOSIS — R27.8 COORDINATION IMPAIRMENT: Primary | ICD-10-CM

## 2024-05-31 PROCEDURE — 97530 THERAPEUTIC ACTIVITIES: CPT | Mod: PO

## 2024-05-31 PROCEDURE — 97110 THERAPEUTIC EXERCISES: CPT | Mod: PO

## 2024-05-31 NOTE — PROGRESS NOTES
[facility]  Occupational Therapy Treatment Note & Monthly Progress Note     Date: 5/31/2024  Name: Terence Bianchi  Clinic Number: [id number]    Therapy Diagnosis:   Encounter Diagnosis   Name Primary?    Coordination impairment Yes     Physician: Angie Bowers FNP    Physician Orders: Eval and Treat  Medical Diagnosis:   R53.1 (ICD-10-CM) - Right sided weakness   I63.81 (ICD-10-CM) - Left sided lacunar stroke      Evaluation Date: 4/19/2024  Progress Note(s) Done: 5/31/2024   Plan of Care Certification Period: 6/14/2024  Insurance Authorization Period Expiration: 12/31/2024  Date of Return to MD: tba  Visit # / Visits authorized: 5 / 20 (+eval)  FOTO: 1/ 3; last assessed 4/19/2024           Precautions:  Standard     Time In: 0935  Time Out: 1015  Total Billable Time: 40 minutes    Subjective     Patient reports: Doing well. Legs are feeling better. Shoulder is feeling better.     He was not compliant with home exercise program given.   Response to previous treatment: no concerns   Functional change:  no significant change compared to previous session per pt report     Pain: 0/10  Location: no pain reported     Objective     Objective Measures updated at progress report unless specified.    At 0937, YW=221/86, HR=75, SpO2=98%.     Strength 5/31/2024 4/19/2024 4/19/2024   **within available ROM** Right  Right  Left    Shoulder flex 4+/5 4-/5 5/5   Shoulder abd 4+/5 4/5 4+/5   Shoulder ER 4-/5 3+/5 4/4   Shoulder IR 5/5 5/5 5/5   Shoulder Extension 5/5 5/5 5/5   Shoulder Horizontal adduction 5/5 4/5 4/5   Elbow flex 5/5 4+/5 5/5   Elbow ext 5/5 4/5 5/5   Wrist flex 5/5 4+/5 4+/5   Wrist ext 5/5 4+/5 4+/5   Supination 5/5 4+/5 4+/5   Pronation 5/5 4+/5 4+/5      Fine motor coordination:   9 Hole Peg Test (9HPT) Right  Left    5/31/2024  30s 27s   4/19/2024 28s 28s   [norms for men aged 61-65: R=20.87s +/-3.50s; L=21.60s +/-2.98s (Vidhya et al, 2003)]     Treatment     Terence received  the treatments listed below:      therapeutic exercises to develop strength, endurance, ROM, and flexibility for 27 minutes including:    See vitals above     Strength assessment with results as noted above.     Bilateral shoulder strengthening with purple resistive band, 3x15    Bilateral scapular retraction with purple resistive band, 3x10    Bilateral scapular depression 3x10.     manual therapy techniques were applied for 0 minutes, including: NA      Therapeutic activities to improve functional performance for 13  minutes, including:  Fine motor coordination (FMC) with results noted above.   With 2.5# wrist weights, Terence reached overhead to place resistive clothespins on a line, allowing him to address shoulder strength as well as fine motor coordination (FMC). Completed x19 with each hand and then removed x19 with each hand, resulting in 38 overhead reaches with each hand.     Patient Education and Home Exercises     Education provided:   - positioning of shoulder anatomy during exercises  - Progress towards goals     Written Home Exercises Provided: yes.  Exercises were reviewed and Terence was able to demonstrate them prior to the end of the session.  Terence demonstrated good  understanding of the home exercise program provided. See electronic medical record under Patient Instructions for exercises provided during therapy sessions.       Assessment     Terence tolerated today's treatment well. He has met 2/3 short-term goals and is progressing towards long-term goals. He can continue to benefit from skilled OT to increase strength & coordination of shoulder muscles as well as distal right coordination.     Terence is progressing well towards his goals and there are no updates to goals at this time. Pt prognosis is Good.     Patient will continue to benefit from skilled outpatient occupational therapy to address the deficits listed in the problem list on initial evaluation provide patient/family education and to  maximize patient's level of independence in the home and community environment.     Patient's spiritual, cultural and educational needs considered and patient agreeable to plan of care and goals.    Anticipated barriers to occupational therapy: distance to therapy    Goals:  Short Term Goals (4 weeks) Status When Last Assessed  Progressing/ Met   1) patient to be independent with initial HEP   MET per patient report  5/31/2024      2) patient to increase right shoulder strength to 4/5 flexion & 4-/5 external rotation to increase independence with overhead activities Strength 5/31/24    **within available ROM** Right    Shoulder flex 4+/5   Shoulder abd 4+/5   Shoulder ER 4-/5   Shoulder IR 5/5   Shoulder Extension 5/5   Shoulder Horizontal adduction 5/5   Elbow flex 5/5   Elbow ext 5/5   Wrist flex 5/5   Wrist ext 5/5   Supination 5/5   Pronation 5/5    MET  5/31/2024      3) patient to improve right upper extremity fine motor coordination (FMC) with a 9-Hole Peg Test time of 26s or better (9HPT) Right  Left      4/19/24 28s 28s    (progressing 5/31/2024)           LongTerm Goals (8 weeks) Status When Last Assessed  Progressing/ Met   1) patient to increase right shoulder strength to 4+/5 flexion & abduction and & 4/5 (or better) external rotation to increase independence with overhead activities Strength 5/31/24    **within available ROM** Right    Shoulder flex 4+/5   Shoulder abd 4+/5   Shoulder ER 4-/5   Shoulder IR 5/5   Shoulder Extension 5/5   Shoulder Horizontal adduction 5/5   Elbow flex 5/5   Elbow ext 5/5   Wrist flex 5/5   Wrist ext 5/5   Supination 5/5   Pronation 5/5    (progressing 5/31/2024)     2) patient to improve right upper extremity fine motor coordination (FMC) with a 9-Hole Peg Test time of 24s or better  (progressing 5/31/2024)     3) patient will be able to sign name without difficulty x5 trials in one session Difficult on eval per patient report. (progressing 5/31/2024)        Additional  functional goals to be added as pt progresses and per his priorities.      Plan   Certification Period/Plan of care expiration: 4/19/2024 to 6/14/2024.     Outpatient Occupational Therapy 2 times weekly for 8 weeks to include the following interventions: Manual Therapy, Moist Heat/ Ice, Neuromuscular Re-ed, Patient Education, Self Care, Therapeutic Activities, and Therapeutic Exercise, and any other treatment modalities that will facilitate Terence Bianchi's ability to reach his goals.      Updates/Grading for next session: review hep; progress as tolerated    Nicky Wilkinson OT   5/31/2024

## 2024-05-31 NOTE — PATIENT INSTRUCTIONS
Resisted External Rotation: in Neutral - Bilateral        Sit or stand,  purple  elastic band in both hands, elbows at sides, bent to 90°, forearms forward. Pinch shoulder blades together and rotate forearms out. Keep elbows at sides. Hold 3 seconds. If you have trouble keeping your elbows at your sides, put a small towel under your elbows.   Repeat 15 times per set. Do 3 sets per session. Do 3 sessions per week.   Copyright © I. All rights reserved.        Strengthening: Resisted Row      Face anchor at waist height, medium to wide stance. Thumbs up, pull arms back, squeezing shoulder blades together - like you're trying to make your elbows touch behind your back. Do not shrug up. Slowly return to start.  Repeat 10 times per set. Do 3 sets per session. Do 3 sessions per week.   Copyright © [employer]. All rights reserved.          Strengthening: Resisted scapular depression    Secure band over top of closed door. Hold  purple  elastic band in both hands with arm at your side, elbow straight. Reach down toward the floor and then allow the resistance of the band to draw the shoulder back up. Do not actively shrug your shoulder when you relax.   Repeat 10 times per set. Do 3 sets per session. Do 3 sessions per week.         Strengthening: Lower Trapezius

## 2024-06-04 ENCOUNTER — CLINICAL SUPPORT (OUTPATIENT)
Dept: REHABILITATION | Facility: HOSPITAL | Age: 64
End: 2024-06-04
Payer: MEDICARE

## 2024-06-04 DIAGNOSIS — R27.8 COORDINATION IMPAIRMENT: Primary | ICD-10-CM

## 2024-06-04 PROCEDURE — 97110 THERAPEUTIC EXERCISES: CPT | Mod: PO

## 2024-06-04 NOTE — PROGRESS NOTES
"  [facility]  Occupational Therapy Treatment Note     Date: 6/4/2024  Name: Terence Bianchi  Clinic Number: [id number]    Therapy Diagnosis:   Encounter Diagnosis   Name Primary?    Coordination impairment Yes     Physician: Angie Bowers FNP    Physician Orders: Eval and Treat  Medical Diagnosis:   R53.1 (ICD-10-CM) - Right sided weakness   I63.81 (ICD-10-CM) - Left sided lacunar stroke      Evaluation Date: 4/19/2024  Progress Note(s) Done: 5/31/2024   Plan of Care Certification Period: 6/14/2024  Insurance Authorization Period Expiration: 12/31/2024  Date of Return to MD: tba  Visit # / Visits authorized: 6 / 20 (+eval)  FOTO: 1/ 3; last assessed 4/19/2024           Precautions:  Standard     Time In: 8:00 AM  Time Out: 8:45 AM  Total Billable Time: 45 minutes    Subjective     Patient reports: Doing okay.     He was not compliant with home exercise program given.   Response to previous treatment: no concerns   Functional change:  no significant change compared to previous session per pt report     Pain: 0/10  Location: no pain reported     Objective     Objective Measures updated at progress report unless specified.    At 8:04 AM BP=160/103, HR=75, SpO2=98%.     Treatment     Terence received the treatments listed below:      therapeutic exercises to develop strength, endurance, ROM, and flexibility for 40 minutes including:    UBE x5 min forward/ 5 min back w/ tristen UE, level 4.0, standing to increase tristen UE act nakul and UB strength, as well as improving cardiovascular fitness. He was encouraged to keep rate of perceived exertion (RPE) at "easy to moderate" (3-4/10) and/ or keeping METs (noted on monitor) at 2.5 or better. Alcantara avg=49, peak alcantara=72. Pt's reported RPE= 5/10. Pt required several cues to switch directions and sustain that direction.     Supine shoulder flexion (right upper extremity) with 4 lb dumbbell 2x10. Cues to keep integrity of movement the entire time.     Supine " shoulder circles (right upper extremity) with 4 lb dumbbell 3x10 each direction.     Supine external rotation (right upper extremity) at ~90* with 4 lb dumbbell 2x10     Bilateral scapular retraction with gray resistive band, 2x10    Seated right shoulder abduction with gray resistive band 2x10     Supine scapular protraction 2x10     Supine Bilateral scapular depression 2x10.     manual therapy techniques were applied for 5 minutes, including:     See vitals above     Sit <> supine / mod I     Therapeutic activities to improve functional performance for 0 minutes, including:  NA    Patient Education and Home Exercises     Education provided:   - positioning of shoulder anatomy during exercises  - Progress towards goals     Written Home Exercises Provided: yes.  Exercises were reviewed and Terence was able to demonstrate them prior to the end of the session.  Terence demonstrated good  understanding of the home exercise program provided. See electronic medical record under Patient Instructions for exercises provided during therapy sessions.       Assessment     Terence participated well in his session. Pt frequently requires cues for form and termination of task. Pt with difficulty remembering instructions given.He was able to complete all exercise given and tolerated 4 lb dumbbell and gray theraband well.  He can continue to benefit from skilled OT to increase strength & coordination of shoulder muscles as well as distal right coordination.     Terence is progressing well towards his goals and there are no updates to goals at this time. Pt prognosis is Good.     Patient will continue to benefit from skilled outpatient occupational therapy to address the deficits listed in the problem list on initial evaluation provide patient/family education and to maximize patient's level of independence in the home and community environment.     Patient's spiritual, cultural and educational needs considered and patient agreeable to plan  of care and goals.    Anticipated barriers to occupational therapy: distance to therapy    Goals:  Short Term Goals (4 weeks) Status When Last Assessed  Progressing/ Met   1) patient to be independent with initial HEP   MET per patient report  5/31/2024      2) patient to increase right shoulder strength to 4/5 flexion & 4-/5 external rotation to increase independence with overhead activities Strength 5/31/24    **within available ROM** Right    Shoulder flex 4+/5   Shoulder abd 4+/5   Shoulder ER 4-/5   Shoulder IR 5/5   Shoulder Extension 5/5   Shoulder Horizontal adduction 5/5   Elbow flex 5/5   Elbow ext 5/5   Wrist flex 5/5   Wrist ext 5/5   Supination 5/5   Pronation 5/5    MET  5/31/2024      3) patient to improve right upper extremity fine motor coordination (FMC) with a 9-Hole Peg Test time of 26s or better (9HPT) Right  Left      4/19/24 28s 28s    (progressing 6/4/2024)           LongTerm Goals (8 weeks) Status When Last Assessed  Progressing/ Met   1) patient to increase right shoulder strength to 4+/5 flexion & abduction and & 4/5 (or better) external rotation to increase independence with overhead activities Strength 5/31/24    **within available ROM** Right    Shoulder flex 4+/5   Shoulder abd 4+/5   Shoulder ER 4-/5   Shoulder IR 5/5   Shoulder Extension 5/5   Shoulder Horizontal adduction 5/5   Elbow flex 5/5   Elbow ext 5/5   Wrist flex 5/5   Wrist ext 5/5   Supination 5/5   Pronation 5/5    (progressing 6/4/2024)     2) patient to improve right upper extremity fine motor coordination (FMC) with a 9-Hole Peg Test time of 24s or better  (progressing 6/4/2024)     3) patient will be able to sign name without difficulty x5 trials in one session Difficult on eval per patient report. (progressing 6/4/2024)        Additional functional goals to be added as pt progresses and per his priorities.      Plan   Certification Period/Plan of care expiration: 4/19/2024 to 6/14/2024.     Outpatient Occupational  Therapy 2 times weekly for 8 weeks to include the following interventions: Manual Therapy, Moist Heat/ Ice, Neuromuscular Re-ed, Patient Education, Self Care, Therapeutic Activities, and Therapeutic Exercise, and any other treatment modalities that will facilitate Terence Bianchi's ability to reach his goals.      Updates/Grading for next session: review hep; progress as tolerated    Lucretia Arellano OT   6/4/2024

## 2024-06-06 ENCOUNTER — CLINICAL SUPPORT (OUTPATIENT)
Dept: REHABILITATION | Facility: HOSPITAL | Age: 64
End: 2024-06-06
Payer: MEDICARE

## 2024-06-06 DIAGNOSIS — R41.841 COGNITIVE COMMUNICATION DEFICIT: Primary | ICD-10-CM

## 2024-06-06 DIAGNOSIS — R27.8 COORDINATION IMPAIRMENT: Primary | ICD-10-CM

## 2024-06-06 PROCEDURE — 97530 THERAPEUTIC ACTIVITIES: CPT | Mod: PO

## 2024-06-06 PROCEDURE — 97130 THER IVNTJ EA ADDL 15 MIN: CPT | Mod: PO

## 2024-06-06 PROCEDURE — 97110 THERAPEUTIC EXERCISES: CPT | Mod: PO

## 2024-06-06 PROCEDURE — 97129 THER IVNTJ 1ST 15 MIN: CPT | Mod: PO

## 2024-06-06 NOTE — PROGRESS NOTES
"  [facility]  Occupational Therapy Treatment Note     Date: 6/6/2024  Name: Terence Bianchi  Clinic Number: [id number]    Therapy Diagnosis:   Encounter Diagnosis   Name Primary?    Coordination impairment Yes     Physician: Angie Bowers FNP    Physician Orders: Eval and Treat  Medical Diagnosis:   R53.1 (ICD-10-CM) - Right sided weakness   I63.81 (ICD-10-CM) - Left sided lacunar stroke      Evaluation Date: 4/19/2024  Progress Note(s) Done: 5/31/2024   Plan of Care Certification Period: 6/14/2024  Insurance Authorization Period Expiration: 12/31/2024  Date of Return to MD: tba  Visit # / Visits authorized: 7 / 20 (+eval)  FOTO: 2/ 3; last assessed 6/6/2024           Precautions:  Standard     Time In: 8:05 AM  Time Out: 8:45 AM  Total Billable Time: 40 minutes    Subjective     Patient reports: Doing good. Would like to work on handwriting today.     He was not compliant with home exercise program given.   Response to previous treatment: no concerns   Functional change:  no significant change compared to previous session per pt report     Pain: 0/10  Location: no pain reported     Objective     Objective Measures updated at progress report unless specified.    At 8:09AM SK=250/82, HR=75, SpO2=98%.     FOTO = 65/100     Treatment     Terence received the treatments listed below:      therapeutic exercises to develop strength, endurance, ROM, and flexibility for 12 minutes including:    UBE x5 min forward/ 5 min back w/ tristen UE, level 4.2, standing to increase tristen UE act nakul and UB strength, as well as improving cardiovascular fitness. He was encouraged to keep rate of perceived exertion (RPE) at "easy to moderate" (3-4/10) and/ or keeping METs (noted on monitor) at 2.5 or better. Bertrand avg=63, peak ctitj=327. Pt's reported RPE= 7/10. Rest break taken after completion.     manual therapy techniques were applied for 0  minutes, including:     Therapeutic activities to improve functional " performance for 28 minutes, including:    See vitals above     FOTO completed     Pt practiced handwriting on lined paper. Pt frequently stopped to shake out right hand. Pt began with small, mostly legible writing. Frequently the form of the letters were decreased. When asked to write a little bigger and focus on the form of each individual letter. When he did this, writing was 100% legible (all printing). Pt encouraged to practice handwriting at home. He stated he was really busy and would try, but he didn't think he would. OT encouraged to even taken 5 minutes to practice daily, if this was a priority for him.   .  Picking up and placing pennies one at a time in 5 groups of 5 to improve finger<>palm translation for in-hand manipulation     Patient Education and Home Exercises     Education provided:   - positioning of shoulder anatomy during exercises  - Progress towards goals     Written Home Exercises Provided: yes.  Exercises were reviewed and Terence was able to demonstrate them prior to the end of the session.  Terence demonstrated good  understanding of the home exercise program provided. See electronic medical record under Patient Instructions for exercises provided during therapy sessions.       Assessment     Terence participated well in his session. Completed UBE with higher RPE rating today. Pt practiced handwriting. When focusing on big movement and form, it was was 100% legible. Pt encouraged to practice at home. No difficulty with [facility] carlito task.  FOTO score decreased by 2 points. He can continue to benefit from skilled OT to increase strength & coordination of shoulder muscles as well as distal right coordination.     Terence is progressing well towards his goals and there are no updates to goals at this time. Pt prognosis is Good.     Patient will continue to benefit from skilled outpatient occupational therapy to address the deficits listed in the problem list on initial evaluation provide patient/family  education and to maximize patient's level of independence in the home and community environment.     Patient's spiritual, cultural and educational needs considered and patient agreeable to plan of care and goals.    Anticipated barriers to occupational therapy: distance to therapy    Goals:  Short Term Goals (4 weeks) Status When Last Assessed  Progressing/ Met   1) patient to be independent with initial HEP   MET per patient report  5/31/2024      2) patient to increase right shoulder strength to 4/5 flexion & 4-/5 external rotation to increase independence with overhead activities Strength 5/31/24    **within available ROM** Right    Shoulder flex 4+/5   Shoulder abd 4+/5   Shoulder ER 4-/5   Shoulder IR 5/5   Shoulder Extension 5/5   Shoulder Horizontal adduction 5/5   Elbow flex 5/5   Elbow ext 5/5   Wrist flex 5/5   Wrist ext 5/5   Supination 5/5   Pronation 5/5    MET  5/31/2024      3) patient to improve right upper extremity fine motor coordination (FMC) with a 9-Hole Peg Test time of 26s or better (9HPT) Right  Left      4/19/24 28s 28s    (progressing 6/6/2024)           LongTerm Goals (8 weeks) Status When Last Assessed  Progressing/ Met   1) patient to increase right shoulder strength to 4+/5 flexion & abduction and & 4/5 (or better) external rotation to increase independence with overhead activities Strength 5/31/24    **within available ROM** Right    Shoulder flex 4+/5   Shoulder abd 4+/5   Shoulder ER 4-/5   Shoulder IR 5/5   Shoulder Extension 5/5   Shoulder Horizontal adduction 5/5   Elbow flex 5/5   Elbow ext 5/5   Wrist flex 5/5   Wrist ext 5/5   Supination 5/5   Pronation 5/5    (progressing 6/6/2024)     2) patient to improve right upper extremity fine motor coordination (FMC) with a 9-Hole Peg Test time of 24s or better  (progressing 6/6/2024)     3) patient will be able to sign name without difficulty x5 trials in one session Difficult on eval per patient report. (progressing 6/6/2024)         Additional functional goals to be added as pt progresses and per his priorities.      Plan   Certification Period/Plan of care expiration: 4/19/2024 to 6/14/2024.     Outpatient Occupational Therapy 2 times weekly for 8 weeks to include the following interventions: Manual Therapy, Moist Heat/ Ice, Neuromuscular Re-ed, Patient Education, Self Care, Therapeutic Activities, and Therapeutic Exercise, and any other treatment modalities that will facilitate Terence Bianchi's ability to reach his goals.      Updates/Grading for next session: review hep; progress as tolerated    Lucretia Arellano OT   6/6/2024

## 2024-06-06 NOTE — PROGRESS NOTES
[facility]  Speech Therapy Progress Note- Neurological Rehabilitation  Date: 6/6/2024     Name: Terence Bianchi   MRN: [id number]   Therapy Diagnosis:   Cognitive Communication Deficit    Physician: Angie Bowers FNP  Physician Orders: Ambulatory Referral to Speech Therapy   Medical Diagnosis: Left sided lacunar stroke [I63.81]     Visit #/ Visits Authorized: 9/ 12  Date of Evaluation:  4/22/2024  Insurance Authorization Period: 4/26/2024 to 12/31/2024  Plan of Care Expiration Date:    7/15/2024  Extended Plan of Care:  NA   Progress Note: 6/24/24    Time In:  0845  Time Out:  0930  Total Billable Time:60    Precautions: Standard  Subjective:   Patient reports: Things moving forward in what we are trying to do. He talked to his wife about his overstimulation and cognitive challenges since his stroke.  He was reluctant but he did it. He reported her understanding of his deficits is emerging. Patient reports that he will be returning to work August.      He was compliant to home exercise program. Response to previous treatment: good  Pain Scale: no pain indicated throughout session  Objective:   TIMED  Procedure Min.   Cognitive Therapeutic Interventions, first 15 minutes CPT 91681  15   Cognitive Therapeutic Interventions, each additional 15 minutes CPT 01225  45           Short Term Goals: (6 weeks) Current Progress:   Patient will complete alternating attention tasks in quiet environment with 90% accuracy independently.     Progressing/ Not Met 6/6/2024   Patient completed re-evaluation of cognition with RBANS. Redirection was needed through out the session.Scoring to follow with results posted next progress note with recommendations.    The patient had difficulty staying on topic with many tangential comments and shifts. He reported this was probably the same prior to the stroke. Patient educated re: choosing 1 goal to focus on within his home to alleviate cognitive fatigue. He was not able to  pin point any 1 thing.      Previous Session:Patient completed complex sustained attention task following directions (3 step) with 90%  accuracy.    Patient completed alternating attention tasks on an ipad reading a map and alternating between information with 90% accuracy after making adjustments for vision by enlarging the screen.     Goal Met x 1       2. Patient will complete divided attention tasks in quiet environment with 90% accuracy independently      Progressing/ Not Met 6/6/2024   Current session (06/06/2024):  Patient completed re-evaluation of cognition with RBANS. Patient was able to shift between tasks without assistance but did need to intentionally focus. Scoring to follow with results posted next progress note with recommendations.      Met x1   3. Patient will complete a simple written task to increase verbal attention and memory (I.e. sample bill paying activity, recipe, or multiple choice comprehension questions to 1 paragraphs) with 80% accuracy.      Progressing/ Not Met 6/6/2024   Not formally addressed this session   4. Patient will participate in Attentive Reading and Constrained Summarization with an adequate summary including no more than 5 general words, extraneous information or pronouns to improve thought organization, verbal fluency, and efficiency in conversation.      Progressing/ Not Met 6/6/2024   Not formally addressed this session.    5. Patient will complete high level problem solving tasks with 90% accuracy independently.      Progressing/ Not Met 6/6/2024   Not addressed in today's session     6. Patient will utilize spaced retrieval training (starting at 15 seconds) with recall of 5/5 units of information at 16 minutes with use of a timer independently.        Progressing/ Not Met 6/6/2024   Not addressed in today's session.     6. Patient will recall 4/4 memory strategies independently across 3 consecutive sessions.      Patient recalled 4/4 WRAP strategies independently at  the beginning of the session.    GOAL MET 5/16/2024         Patient Education/Response:   Patient educated regarding the following:  Problem solving sensory overload situations in the home environment  2. Cognitive fatigue and taking intentional cognitive rest breaks  3. Problem solving strategies        Home program established: Patient given assignment to come up with new solutions to sensory overload situations in the home.  Patient verbalized understanding to all above education provided.     See Electronic Medical Record under Patient Instructions for exercises provided throughout therapy.  Assessment:   Terence continues with mild cognitive deficits in attention, memory, and problem solving, however, he reports that his deficits are not affecting him functionally except in the area of sensory overload in the home environment.Cognitive re-evaluation completed today with scoring and recommendations regarding need for continued therapy to follow. Patients primary area of difficulty is overstimulation in the home environment. The patient reports prior to his stroke he was able to cognitively deal with over stimulation, however, now his quality of life is affected by his inability to do this. He has made progress in attempts to discuss this with his wife and family. These deficits in attention and self regulation given sensory overload lead to cognitive fatigue which affects his ability to attend and problem solve situations in the home in a timely manner to maintain safety and cause a decline in his quality of life.      Terence is progressing well towards his goals and is believed to be WFL in all areas of cognition except for dealing with sensory overload in the home. Formal assessment of current level of cognition to be completed next session with plan of care updated at that time.     Patient prognosis is Good. Patient will continue to benefit from skilled outpatient speech and language therapy to address the  deficits in sensory overload, provide patient/family education and to maximize patient's level of independence in the home and community environment.   Medical necessity is demonstrated by the following IMPAIRMENTS:  Deficits in attention and self regulation given sensory overload lead to cognitive fatigue which affects his ability to attend and problem solve situations in the home in a timely manner to maintain safety and cause a decline in his quality of life.  Barriers to Therapy: Family support  Patient's spiritual, cultural and educational needs considered and patient agreeable to plan of care and goals.  Plan:   Continue Plan of Care with focus on rehabilitation and compensation for cognitive-communication deficits s/p left sided lacunar stroke. Formal assessment/ post testing of current level of cognition to be completed next session with plan of care updated at that time.    OCTAVIO Ashley, CCC-SLP  Speech-Language Pathology  6/6/2024

## 2024-06-11 ENCOUNTER — CLINICAL SUPPORT (OUTPATIENT)
Dept: REHABILITATION | Facility: HOSPITAL | Age: 64
End: 2024-06-11
Payer: MEDICARE

## 2024-06-11 DIAGNOSIS — R27.8 COORDINATION IMPAIRMENT: Primary | ICD-10-CM

## 2024-06-11 DIAGNOSIS — R41.841 COGNITIVE COMMUNICATION DEFICIT: Primary | ICD-10-CM

## 2024-06-11 PROCEDURE — 97130 THER IVNTJ EA ADDL 15 MIN: CPT | Mod: PO

## 2024-06-11 PROCEDURE — 97129 THER IVNTJ 1ST 15 MIN: CPT | Mod: PO

## 2024-06-11 PROCEDURE — 97110 THERAPEUTIC EXERCISES: CPT | Mod: PO

## 2024-06-11 NOTE — PROGRESS NOTES
[facility]  Speech Therapy Progress Note- Neurological Rehabilitation  Date: 6/11/2024     Name: Terence Bianchi   MRN: [id number]   Therapy Diagnosis:   Cognitive Communication Deficit    Physician: Angie Bowers FNP  Physician Orders: Ambulatory Referral to Speech Therapy   Medical Diagnosis: Left sided lacunar stroke [I63.81]     Visit #/ Visits Authorized: 10/ 12  Date of Evaluation:  4/22/2024  Insurance Authorization Period: 4/26/2024 to 12/31/2024  Plan of Care Expiration Date:    7/15/2024  Extended Plan of Care:  NA   Progress Note: 6/24/24    Time In:  0715  Time Out:  0800  Total Billable Time:45    Precautions: Standard  Subjective:   Patient reports:   His wife is starting to understand his cognitive needs.His wife has noticed that he gets frustrated easily since his stroke. He is very tangential with poor attention and topic maintenance and may be causing additional conflict at home.    He was compliant to home exercise program. Response to previous treatment: fair  Pain Scale: no pain indicated throughout session  Objective:   TIMED  Procedure Min.   Cognitive Therapeutic Interventions, first 15 minutes CPT 02452  15   Cognitive Therapeutic Interventions, each additional 15 minutes CPT 98889  45           Short Term Goals: (6 weeks) Current Progress:   Patient will complete alternating attention tasks in quiet environment with 90% accuracy independently.     Progressing/ Not Met 6/11/2024   Current session (06/11/2024):  For everyday simple and routine cognitive tasks attention (sustained, divided and alternating) is WFL. For moderate to complex tasks for alternating and divided attention (deduction by exclusion) the patient required moderate-maximum assist today.     He continues to be tangential with poor topic maintenance during conversations. This could be his baseline. He was not receptive to altering this today.        Goal Met x 2 for simple and routine alternating task,   Poor for complex unfamiliar.       2. Patient will complete divided attention tasks in quiet environment with 90% accuracy independently      Progressing/ Not Met 6/11/2024   Current session (06/11/2024):  See #1      Met x2 for simple and routine for ADL's and IADLS's. Poor for complex unfamiliar.   3. Patient will complete a simple written task to increase verbal attention and memory (I.e. sample bill paying activity, recipe, or multiple choice comprehension questions to 1 paragraphs) with 80% accuracy.      Progressing/ Not Met 6/11/2024   Not formally addressed this session   4. Patient will participate in Attentive Reading and Constrained Summarization with an adequate summary including no more than 5 general words, extraneous information or pronouns to improve thought organization, verbal fluency, and efficiency in conversation.      Progressing/ Not Met 6/11/2024   Not formally addressed this session.    5. Patient will complete high level problem solving tasks with 90% accuracy independently.      Progressing/ Not Met 6/11/2024   Deductive by exclusion tasks. Required moderate maximum assist to understand the directions with poor mental flexibility to understand even after several directives were given.     6. Patient will utilize spaced retrieval training (starting at 15 seconds) with recall of 5/5 units of information at 16 minutes with use of a timer independently.        Progressing/ Not Met 6/11/2024   Not addressed in today's session.     6. Patient will recall 4/4 memory strategies independently across 3 consecutive sessions.      Patient recalled 4/4 WRAP strategies independently at the beginning of the session.    GOAL MET 5/16/2024   7. The patient will recognize when his comments are tangential and off topic given questions periodically by Speech Language Pathologist 80% of the time.    Goal established 06/11/2024   Current session (06/11/2024): Patient requires maximum cues to recognize and stay  on topic with more than 10 off topic and tangential comments made through out a 30 minute period.   8. The patient will recognize times when his cognition affects his daily life by keeping a log of how many times and what his cognitive deficits were.    Goal established 06/11/2024  Patient given homework to log when cognitive confusion or blocks occurred during the week and what they blocks were.     Patient Education/Response:   Patient educated regarding the following:  Problem solving sensory overload situations in the home environment  2. Cognitive fatigue and taking intentional cognitive rest breaks  3. Problem solving strategies  4. Pragmatics including poor topic maintenance        Home program established: Patient given assignment to come up with new solutions to sensory overload situations in the home.  Patient verbalized understanding to all above education provided.     See Electronic Medical Record under Patient Instructions for exercises provided throughout therapy.  Assessment:   Terence continues with mild cognitive deficits in attention, memory, and problem solving. For simple and routine tasks performed as a part of his daily routine (ADL's and IADL's)  his attention, memory and problem solving are thought to be WFL with the patient reporting the same. Deficits that continue to affect his daily life is impaired ability to deal with environmental overstimulation and poor pragmatics including poor topic maintenance with many tangential comments and poor perception of cues from conversational partners. Unknown if these deficits were present prior to his stroke. Awareness of this may be emerging. For more complex, less familiar cognitive tasks he demonstrated poor mental flexibility, alternating and divided attention and problem solving.       Terence is progressing well towards his goals.     Patient prognosis is Good. Patient will continue to benefit from skilled outpatient speech and language therapy to  address the deficits in sensory overload, provide patient/family education and to maximize patient's level of independence in the home and community environment.   Medical necessity is demonstrated by the following IMPAIRMENTS:  Deficits in attention and self regulation given sensory overload lead to cognitive fatigue which affects his ability to attend and problem solve situations in the home in a timely manner to maintain safety and cause a decline in his quality of life.  Barriers to Therapy: Family support  Patient's spiritual, cultural and educational needs considered and patient agreeable to plan of care and goals.  Plan:   Continue Plan of Care with focus on rehabilitation and compensation for cognitive-communication deficits s/p left sided lacunar stroke. Formal assessment/ post testing of current level of cognition to be completed next session with plan of care updated at that time.    OCTAVIO Ashley, CCC-SLP  Speech-Language Pathology  6/11/2024

## 2024-06-11 NOTE — PROGRESS NOTES
[facility]  Occupational Therapy Treatment Note     Date: 6/11/2024  Name: Terence Bianchi  Clinic Number: [id number]    Therapy Diagnosis:   Encounter Diagnosis   Name Primary?    Coordination impairment Yes     Physician: Angie Bowers FNP    Physician Orders: Eval and Treat  Medical Diagnosis:   R53.1 (ICD-10-CM) - Right sided weakness   I63.81 (ICD-10-CM) - Left sided lacunar stroke      Evaluation Date: 4/19/2024  Progress Note(s) Done: 5/31/2024   Plan of Care Certification Period: 6/14/2024  Insurance Authorization Period Expiration: 12/31/2024  Date of Return to MD: tba  Visit # / Visits authorized: 8 / 20 (+eval)  FOTO: 2/ 3; last assessed 6/6/2024           Precautions:  Standard     Time In: 0807  Time Out: 0901  Total Billable Time: 54 minutes    Subjective     Patient reports: helped a friend replace freon in air conditioning unit     He was not compliant with home exercise program given.   Response to previous treatment: no concerns   Functional change:  no significant change compared to previous session per pt report     Pain: 0/10  Location: no pain reported     Objective     Objective Measures updated at progress report unless specified.    At 0810, LJ=352/104, HR=75, SpO2=98%.   Due to possibility that patient was pushing his left hand into his thigh while   Raised table to chest height to support arm and encouraged patient to relax  At 0813, UB=482/90, HR=75     Treatment     Terence received the treatments listed below:      therapeutic exercises to develop strength, endurance, ROM, and flexibility for 12 minutes including:    Purple band for shoulder strengthening 3x12 each:  bilateral shoulder external rotation  Bilateral scapular retraction  Right shoulder internal rotation  Right shoulder extension  Right shoulder adduction  Bilateral overhead for low traps    manual therapy techniques were applied for 0 minutes, including:     Therapeutic activities to improve  functional performance for 28 minutes, including:    See vitals above   Signing name on lined paper x5 without difficulty.  Pt completed serial opposition with 1cm cubes for 9 groups of 4 to improve FMC. Repeated with 7mm spheres and 5mm cubes.  Picking up and placing 1 cm cubes one at a time in 6 groups of 6 to improve finger<>palm translation for in-hand manipulation. Repeated with 7mm spheres and 5mm cubes.    Patient Education and Home Exercises     Education provided:   - positioning of shoulder anatomy during exercises  - Progress towards goals     Written Home Exercises Provided: yes.  Exercises were reviewed and Terence was able to demonstrate them prior to the end of the session.  Terence demonstrated good  understanding of the home exercise program provided. See electronic medical record under Patient Instructions for exercises provided during therapy sessions.       Assessment     Terence participated well in his session. He met his goal for signing his name and demonstrates progress with both coordination and shoulder strength. He can continue to benefit from skilled OT to increase strength & coordination of shoulder muscles as well as distal right coordination.     Terence is progressing well towards his goals and there are no updates to goals at this time. Pt prognosis is Good.     Patient will continue to benefit from skilled outpatient occupational therapy to address the deficits listed in the problem list on initial evaluation provide patient/family education and to maximize patient's level of independence in the home and community environment.     Patient's spiritual, cultural and educational needs considered and patient agreeable to plan of care and goals.    Anticipated barriers to occupational therapy: distance to therapy    Goals:  Short Term Goals (4 weeks) Status When Last Assessed  Progressing/ Met   1) patient to be independent with initial HEP   MET per patient report  5/31/2024      2) patient to  increase right shoulder strength to 4/5 flexion & 4-/5 external rotation to increase independence with overhead activities Strength 5/31/24    **within available ROM** Right    Shoulder flex 4+/5   Shoulder abd 4+/5   Shoulder ER 4-/5   Shoulder IR 5/5   Shoulder Extension 5/5   Shoulder Horizontal adduction 5/5   Elbow flex 5/5   Elbow ext 5/5   Wrist flex 5/5   Wrist ext 5/5   Supination 5/5   Pronation 5/5    MET  5/31/2024      3) patient to improve right upper extremity fine motor coordination (FMC) with a 9-Hole Peg Test time of 26s or better (9HPT) Right  Left      4/19/24 28s 28s    (progressing 6/11/2024)           LongTerm Goals (8 weeks) Status When Last Assessed  Progressing/ Met   1) patient to increase right shoulder strength to 4+/5 flexion & abduction and & 4/5 (or better) external rotation to increase independence with overhead activities Strength 5/31/24    **within available ROM** Right    Shoulder flex 4+/5   Shoulder abd 4+/5   Shoulder ER 4-/5   Shoulder IR 5/5   Shoulder Extension 5/5   Shoulder Horizontal adduction 5/5   Elbow flex 5/5   Elbow ext 5/5   Wrist flex 5/5   Wrist ext 5/5   Supination 5/5   Pronation 5/5    (progressing 6/11/2024)     2) patient to improve right upper extremity fine motor coordination (FMC) with a 9-Hole Peg Test time of 24s or better  (progressing 6/11/2024)     3) patient will be able to sign name without difficulty x5 trials in one session  MET 6/11/2024         Additional functional goals to be added as pt progresses and per his priorities.      Plan   Certification Period/Plan of care expiration: 4/19/2024 to 6/14/2024.     Outpatient Occupational Therapy 2 times weekly for 8 weeks to include the following interventions: Manual Therapy, Moist Heat/ Ice, Neuromuscular Re-ed, Patient Education, Self Care, Therapeutic Activities, and Therapeutic Exercise, and any other treatment modalities that will facilitate Terence Bianchi's ability to reach his goals.       Updates/Grading for next session: POC update vs d/c    Nicky Wilkinson OT   6/11/2024

## 2024-06-13 ENCOUNTER — CLINICAL SUPPORT (OUTPATIENT)
Dept: REHABILITATION | Facility: HOSPITAL | Age: 64
End: 2024-06-13
Payer: MEDICARE

## 2024-06-13 DIAGNOSIS — R41.841 COGNITIVE COMMUNICATION DEFICIT: Primary | ICD-10-CM

## 2024-06-13 PROCEDURE — 97130 THER IVNTJ EA ADDL 15 MIN: CPT | Mod: PO

## 2024-06-13 PROCEDURE — 97129 THER IVNTJ 1ST 15 MIN: CPT | Mod: PO

## 2024-06-13 NOTE — PROGRESS NOTES
[facility]  Speech Therapy Progress Note- Neurological Rehabilitation  Date: 6/13/2024     Name: Terence Bianchi   MRN: [id number]   Therapy Diagnosis:   Cognitive Communication Deficit    Physician: Angie Bowers FNP  Physician Orders: Ambulatory Referral to Speech Therapy   Medical Diagnosis: Left sided lacunar stroke [I63.81]     Visit #/ Visits Authorized: 11/ 12  Date of Evaluation:  4/22/2024  Insurance Authorization Period: 4/26/2024 to 12/31/2024  Plan of Care Expiration Date:    7/15/2024  Extended Plan of Care:  NA   Progress Note: 6/24/24    Time In:  0755   Time Out:  0840  Total Billable Time:45    Precautions: Standard  Subjective:   Patient reports: Wife's understanding of his deficits in getting better making things easier at home.      He was compliant to home exercise program. Response to previous treatment: fair  Pain Scale: no pain indicated throughout session  Objective:   UNTIMED  Procedure Min.   Cognitive Therapeutic Interventions, first 15 minutes CPT 01408  15   Cognitive Therapeutic Interventions, each additional 15 minutes CPT 63213  30           Short Term Goals: (6 weeks) Current Progress:   Patient will complete alternating attention tasks in quiet environment with 90% accuracy independently.     Progressing/ Not Met 6/13/2024   Current session (06/13/2024):  Sustained attention to stay on topic during conversation improved with only 1 tangential comment in 15 minutes. Patient reports making progress at home trying to stay focused and communicating to his wife his defictis.    Previous Session:  For everyday simple and routine cognitive tasks attention (sustained, divided and alternating) is WFL. For moderate to complex tasks for alternating and divided attention (deduction by exclusion) the patient required moderate-maximum assist today.           Goal Met x 2 for simple and routine alternating task, x 1 for conversation,  Poor for complex unfamiliar.       2.  Patient will complete divided attention tasks in quiet environment with 90% accuracy independently      Progressing/ Not Met 6/13/2024   Current session (06/13/2024):  See #1      Met x2 for simple and routine for ADL's and IADLS's. Poor for complex unfamiliar.   3. Patient will complete a simple written task to increase verbal attention and memory (I.e. sample bill paying activity, recipe, or multiple choice comprehension questions to 1 paragraphs) with 80% accuracy.      Progressing/ Not Met 6/13/2024   Not formally addressed this session   4. Patient will participate in Attentive Reading and Constrained Summarization with an adequate summary including no more than 5 general words, extraneous information or pronouns to improve thought organization, verbal fluency, and efficiency in conversation.      Progressing/ Not Met 6/13/2024   Not formally addressed this session.    5. Patient will complete high level problem solving tasks with 90% accuracy independently.      Progressing/ Not Met 6/13/2024   Goal not addressed this session  6/13/2024      Previous session:  Deductive by exclusion tasks. Required moderate maximum assist to understand the directions with poor mental flexibility to understand even after several directives were given.     6. Patient will utilize spaced retrieval training (starting at 15 seconds) with recall of 5/5 units of information at 16 minutes with use of a timer independently.        Progressing/ Not Met 6/13/2024   Current session (06/13/2024): patient 75% with 15 second delayed recall of 4 words but he was required to also use working memory to determine the 2 different categories  represented by the 4 words.He determined the correct category 50% of the time.      6. Patient will recall 4/4 memory strategies independently across 3 consecutive sessions.      Patient recalled 4/4 WRAP strategies independently at the beginning of the session.    GOAL MET 5/16/2024   7. The patient will  recognize when his comments are tangential and off topic given questions periodically by Speech Language Pathologist 80% of the time.    Goal established 06/13/2024   Current session (06/13/2024):   Recognition of tangential comments evident by the decrease in # of tangential topics after discussing and his report of discussions with his wife regarding it.    Previous session:  Patient requires maximum cues to recognize and stay on topic with more than 10 off topic and tangential comments made through out a 30 minute period.   8. The patient will recognize times when his cognition affects his daily life by keeping a log of how many times and what his cognitive deficits were.    Goal established 06/13/2024  Current session (06/13/2024): Goal not addressed this session  Previous session:     Goal not addressed this session  Patient given homework to log when cognitive confusion or blocks occurred during the week and what they blocks were.     Patient Education/Response:   Patient educated regarding the following:  Problem solving sensory overload situations in the home environment  2. Cognitive fatigue and taking intentional cognitive rest breaks  3. Problem solving strategies  4. Pragmatics including poor topic maintenance        Home program established: Patient given assignment to come up with new solutions to sensory overload situations in the home.  Patient verbalized understanding to all above education provided.     See Electronic Medical Record under Patient Instructions for exercises provided throughout therapy.  Assessment:   Terence continues with mild cognitive deficits in attention, memory, and problem solving. For simple and routine tasks performed as a part of his daily routine (ADL's and IADL's)  his attention, memory and problem solving are thought to be WFL with the patient reporting the same. Deficits that continue to affect his daily life is impaired ability to deal with environmental overstimulation  and poor pragmatics including poor topic maintenance with many tangential comments and poor perception of cues from conversational partners. Unknown if these deficits were present prior to his stroke. Awareness of this is emerging with topics more focused on this session. For more complex, less familiar cognitive tasks he demonstrated poor mental flexibility, alternating and divided attention and problem solving but with good insight into his deficits reporting he is writing things down if needed.       Terence is progressing well towards his goals.     Patient prognosis is Good. Patient will continue to benefit from skilled outpatient speech and language therapy to address the deficits in sensory overload, provide patient/family education and to maximize patient's level of independence in the home and community environment.   Medical necessity is demonstrated by the following IMPAIRMENTS:  Deficits in attention and self regulation given sensory overload lead to cognitive fatigue which affects his ability to attend and problem solve situations in the home in a timely manner to maintain safety and cause a decline in his quality of life.  Barriers to Therapy: Family support  Patient's spiritual, cultural and educational needs considered and patient agreeable to plan of care and goals.  Plan:   Continue Plan of Care with focus on rehabilitation and compensation for cognitive-communication deficits s/p left sided lacunar stroke. Formal assessment/ post testing of current level of cognition to be completed next session with plan of care updated at that time.    OCTAVIO Ashley, CCC-SLP  Speech-Language Pathology  6/13/2024

## 2024-06-25 ENCOUNTER — TELEPHONE (OUTPATIENT)
Dept: FAMILY MEDICINE | Facility: CLINIC | Age: 64
End: 2024-06-25

## 2024-06-25 ENCOUNTER — OFFICE VISIT (OUTPATIENT)
Dept: FAMILY MEDICINE | Facility: CLINIC | Age: 64
End: 2024-06-25
Payer: MEDICARE

## 2024-06-25 ENCOUNTER — HOSPITAL ENCOUNTER (OUTPATIENT)
Dept: RADIOLOGY | Facility: HOSPITAL | Age: 64
Discharge: HOME | End: 2024-06-25
Attending: INTERNAL MEDICINE
Payer: MEDICARE

## 2024-06-25 VITALS
HEIGHT: 73 IN | SYSTOLIC BLOOD PRESSURE: 132 MMHG | BODY MASS INDEX: 25.25 KG/M2 | HEART RATE: 75 BPM | OXYGEN SATURATION: 96 % | WEIGHT: 190.5 LBS | DIASTOLIC BLOOD PRESSURE: 86 MMHG

## 2024-06-25 DIAGNOSIS — I10 ESSENTIAL HYPERTENSION: Primary | ICD-10-CM

## 2024-06-25 DIAGNOSIS — Z23 NEED FOR PNEUMOCOCCAL 20-VALENT CONJUGATE VACCINATION: ICD-10-CM

## 2024-06-25 DIAGNOSIS — Z13.6 SCREENING FOR AAA (ABDOMINAL AORTIC ANEURYSM): ICD-10-CM

## 2024-06-25 DIAGNOSIS — Z12.5 SCREENING FOR PROSTATE CANCER: ICD-10-CM

## 2024-06-25 DIAGNOSIS — Z00.00 ENCOUNTER FOR MEDICARE ANNUAL WELLNESS EXAM: ICD-10-CM

## 2024-06-25 DIAGNOSIS — R73.03 PREDIABETES: ICD-10-CM

## 2024-06-25 DIAGNOSIS — I63.81 LEFT SIDED LACUNAR STROKE: ICD-10-CM

## 2024-06-25 DIAGNOSIS — Z00.00 ANNUAL PHYSICAL EXAM: ICD-10-CM

## 2024-06-25 DIAGNOSIS — L02.31 ABSCESS OF GLUTEAL CLEFT: ICD-10-CM

## 2024-06-25 PROCEDURE — 1126F AMNT PAIN NOTED NONE PRSNT: CPT | Mod: CPTII,S$GLB,, | Performed by: INTERNAL MEDICINE

## 2024-06-25 PROCEDURE — 76775 US EXAM ABDO BACK WALL LIM: CPT | Mod: TC,PO

## 2024-06-25 PROCEDURE — 76775 US EXAM ABDO BACK WALL LIM: CPT | Mod: 26,,, | Performed by: RADIOLOGY

## 2024-06-25 PROCEDURE — 99999 PR PBB SHADOW E&M-EST. PATIENT-LVL III: CPT | Mod: PBBFAC,,, | Performed by: INTERNAL MEDICINE

## 2024-06-25 PROCEDURE — 3008F BODY MASS INDEX DOCD: CPT | Mod: CPTII,S$GLB,, | Performed by: INTERNAL MEDICINE

## 2024-06-25 PROCEDURE — 90677 PCV20 VACCINE IM: CPT | Mod: S$GLB,,, | Performed by: INTERNAL MEDICINE

## 2024-06-25 PROCEDURE — 3044F HG A1C LEVEL LT 7.0%: CPT | Mod: CPTII,S$GLB,, | Performed by: INTERNAL MEDICINE

## 2024-06-25 PROCEDURE — G0009 ADMIN PNEUMOCOCCAL VACCINE: HCPCS | Mod: S$GLB,,, | Performed by: INTERNAL MEDICINE

## 2024-06-25 PROCEDURE — 99214 OFFICE O/P EST MOD 30 MIN: CPT | Mod: S$GLB,,, | Performed by: INTERNAL MEDICINE

## 2024-06-25 PROCEDURE — 3079F DIAST BP 80-89 MM HG: CPT | Mod: CPTII,S$GLB,, | Performed by: INTERNAL MEDICINE

## 2024-06-25 PROCEDURE — 1159F MED LIST DOCD IN RCRD: CPT | Mod: CPTII,S$GLB,, | Performed by: INTERNAL MEDICINE

## 2024-06-25 PROCEDURE — 1101F PT FALLS ASSESS-DOCD LE1/YR: CPT | Mod: CPTII,S$GLB,, | Performed by: INTERNAL MEDICINE

## 2024-06-25 PROCEDURE — 3288F FALL RISK ASSESSMENT DOCD: CPT | Mod: CPTII,S$GLB,, | Performed by: INTERNAL MEDICINE

## 2024-06-25 PROCEDURE — 3075F SYST BP GE 130 - 139MM HG: CPT | Mod: CPTII,S$GLB,, | Performed by: INTERNAL MEDICINE

## 2024-06-25 PROCEDURE — 1160F RVW MEDS BY RX/DR IN RCRD: CPT | Mod: CPTII,S$GLB,, | Performed by: INTERNAL MEDICINE

## 2024-06-25 NOTE — PROGRESS NOTES
Patient ID: Terence Bianchi is a 64 y.o. male.    Chief Complaint: Follow-up and Recurrent Skin Infections    Annual    Problem  HPI / physical exam Plan   Left-sided lacunar stroke No deficits. .  He did quit smoking. Continue asa and lipitor.  Repeat lipids.  Goal LDL less than 70    Hypertension Controlled  Amlodipine .  Monitor blood pressure at home   CHAYITO Drinks plenty of fluids. Blood pressure controlled Repeat labs.  Avoid NSAIDs   Gluteal cleft abscess  Noticed 2 days ago.  To general surgery for drainage.         Medications, recent labs, health maintenance, and diet has been reviewed.    Exercise- yes   Alcohol- occasional   Tobacco- quit            Diagnoses addressed and related orders     1. Essential hypertension  - Comprehensive Metabolic Panel; Future    2. Left sided lacunar stroke  - Comprehensive Metabolic Panel; Future  - Lipid Panel; Future    3. Prediabetes  - Hemoglobin A1C; Future    4. Screening for prostate cancer  - PSA, Screening; Future    5. Screening for AAA (abdominal aortic aneurysm)  - US Abdominal Aorta; Future    6. Need for pneumococcal 20-valent conjugate vaccination  - pneumoc 20-ame conj-dip cr(PF) (PREVNAR-20 (PF)) injection Syrg 0.5 mL    7. Abscess of gluteal cleft  - Ambulatory referral/consult to General Surgery; Future    8. Annual physical exam          Review of Systems   Constitutional:  Negative for fever.   Respiratory:  Negative for shortness of breath.    Cardiovascular:  Negative for chest pain.   Gastrointestinal:  Negative for abdominal pain.     Vitals:    06/25/24 0836   BP: 132/86   Pulse: 75      Wt Readings from Last 3 Encounters:   06/25/24 0836 86.4 kg (190 lb 7.6 oz)   05/08/24 1445 89.6 kg (197 lb 8.5 oz)   05/01/24 0747 88.2 kg (194 lb 7.1 oz)      Body mass index is 25.13 kg/m².     Physical Exam  Cardiovascular:      Rate and Rhythm: Normal rate and regular rhythm.      Heart sounds: No murmur heard.     No gallop.   Pulmonary:      Breath  sounds: Normal breath sounds. No wheezing or rhonchi.   Abdominal:      Palpations: Abdomen is soft.      Tenderness: There is no abdominal tenderness.   Skin:     Comments: Gluteal cleft cyst/abscess.  Tender to palpation.  About the size of a marble            Hypertension Medications               amLODIPine (NORVASC) 10 MG tablet Take 1 tablet (10 mg total) by mouth once daily.           Hyperlipidemia Medications               atorvastatin (LIPITOR) 40 MG tablet Take 1 tablet (40 mg total) by mouth every evening.           Medication List with Changes/Refills   Current Medications    AMLODIPINE (NORVASC) 10 MG TABLET    Take 1 tablet (10 mg total) by mouth once daily.    ASPIRIN (ECOTRIN) 81 MG EC TABLET    Take 1 tablet (81 mg total) by mouth once daily.    ATORVASTATIN (LIPITOR) 40 MG TABLET    Take 1 tablet (40 mg total) by mouth every evening.       I personally reviewed past medical, family and social history.

## 2024-06-26 ENCOUNTER — CLINICAL SUPPORT (OUTPATIENT)
Dept: REHABILITATION | Facility: HOSPITAL | Age: 64
End: 2024-06-26
Payer: MEDICARE

## 2024-06-26 DIAGNOSIS — R27.8 COORDINATION IMPAIRMENT: Primary | ICD-10-CM

## 2024-06-26 PROCEDURE — 97530 THERAPEUTIC ACTIVITIES: CPT | Mod: PO

## 2024-06-26 PROCEDURE — 97110 THERAPEUTIC EXERCISES: CPT | Mod: PO

## 2024-07-09 ENCOUNTER — TELEPHONE (OUTPATIENT)
Dept: ADMINISTRATIVE | Facility: CLINIC | Age: 64
End: 2024-07-09
Payer: MEDICARE

## 2024-07-09 NOTE — TELEPHONE ENCOUNTER
Called pt, informed pt I was calling to remind pt of his in office EAWV on 7/10/24; clinic location provided to patient; pt confirmed appointment and informed to bring all medications to appt

## 2024-07-10 ENCOUNTER — OFFICE VISIT (OUTPATIENT)
Dept: FAMILY MEDICINE | Facility: CLINIC | Age: 64
End: 2024-07-10
Payer: MEDICARE

## 2024-07-10 VITALS
HEIGHT: 73 IN | SYSTOLIC BLOOD PRESSURE: 138 MMHG | TEMPERATURE: 98 F | HEART RATE: 80 BPM | WEIGHT: 185.94 LBS | DIASTOLIC BLOOD PRESSURE: 78 MMHG | OXYGEN SATURATION: 96 % | BODY MASS INDEX: 24.64 KG/M2

## 2024-07-10 DIAGNOSIS — I10 ESSENTIAL HYPERTENSION: ICD-10-CM

## 2024-07-10 DIAGNOSIS — H35.033 HYPERTENSIVE RETINOPATHY, BILATERAL: ICD-10-CM

## 2024-07-10 DIAGNOSIS — Z00.00 ENCOUNTER FOR MEDICARE ANNUAL WELLNESS EXAM: ICD-10-CM

## 2024-07-10 DIAGNOSIS — L02.31 ABSCESS OF GLUTEAL CLEFT: ICD-10-CM

## 2024-07-10 DIAGNOSIS — H34.8311 BRANCH RETINAL VEIN OCCLUSION OF RIGHT EYE WITH RETINAL NEOVASCULARIZATION: ICD-10-CM

## 2024-07-10 DIAGNOSIS — R26.89 IMPAIRED BALANCE AS LATE EFFECT OF CEREBROVASCULAR ACCIDENT: ICD-10-CM

## 2024-07-10 DIAGNOSIS — I69.398 IMPAIRED BALANCE AS LATE EFFECT OF CEREBROVASCULAR ACCIDENT: ICD-10-CM

## 2024-07-10 DIAGNOSIS — H91.90 DECREASED HEARING, UNSPECIFIED LATERALITY: ICD-10-CM

## 2024-07-10 DIAGNOSIS — I63.81 LEFT SIDED LACUNAR STROKE: ICD-10-CM

## 2024-07-10 DIAGNOSIS — I69.351 HEMIPLEGIA AND HEMIPARESIS FOLLOWING CEREBRAL INFARCTION AFFECTING RIGHT DOMINANT SIDE: ICD-10-CM

## 2024-07-10 DIAGNOSIS — Z72.0 TOBACCO USE: ICD-10-CM

## 2024-07-10 DIAGNOSIS — Z00.00 ENCOUNTER FOR PREVENTIVE HEALTH EXAMINATION: Primary | ICD-10-CM

## 2024-07-10 DIAGNOSIS — I70.0 ATHEROSCLEROSIS OF AORTA: ICD-10-CM

## 2024-07-10 PROCEDURE — 3288F FALL RISK ASSESSMENT DOCD: CPT | Mod: CPTII,S$GLB,, | Performed by: NURSE PRACTITIONER

## 2024-07-10 PROCEDURE — 3075F SYST BP GE 130 - 139MM HG: CPT | Mod: CPTII,S$GLB,, | Performed by: NURSE PRACTITIONER

## 2024-07-10 PROCEDURE — 1170F FXNL STATUS ASSESSED: CPT | Mod: CPTII,S$GLB,, | Performed by: NURSE PRACTITIONER

## 2024-07-10 PROCEDURE — G0439 PPPS, SUBSEQ VISIT: HCPCS | Mod: S$GLB,,, | Performed by: NURSE PRACTITIONER

## 2024-07-10 PROCEDURE — 3078F DIAST BP <80 MM HG: CPT | Mod: CPTII,S$GLB,, | Performed by: NURSE PRACTITIONER

## 2024-07-10 PROCEDURE — 1101F PT FALLS ASSESS-DOCD LE1/YR: CPT | Mod: CPTII,S$GLB,, | Performed by: NURSE PRACTITIONER

## 2024-07-10 PROCEDURE — 1160F RVW MEDS BY RX/DR IN RCRD: CPT | Mod: CPTII,S$GLB,, | Performed by: NURSE PRACTITIONER

## 2024-07-10 PROCEDURE — 3044F HG A1C LEVEL LT 7.0%: CPT | Mod: CPTII,S$GLB,, | Performed by: NURSE PRACTITIONER

## 2024-07-10 PROCEDURE — 1124F ACP DISCUSS-NO DSCNMKR DOCD: CPT | Mod: CPTII,S$GLB,, | Performed by: NURSE PRACTITIONER

## 2024-07-10 PROCEDURE — 1159F MED LIST DOCD IN RCRD: CPT | Mod: CPTII,S$GLB,, | Performed by: NURSE PRACTITIONER

## 2024-07-10 RX ORDER — AMLODIPINE BESYLATE 10 MG/1
10 TABLET ORAL DAILY
Qty: 90 TABLET | Refills: 3 | Status: SHIPPED | OUTPATIENT
Start: 2024-07-10

## 2024-07-10 RX ORDER — ATORVASTATIN CALCIUM 40 MG/1
40 TABLET, FILM COATED ORAL NIGHTLY
Qty: 90 TABLET | Refills: 3 | Status: SHIPPED | OUTPATIENT
Start: 2024-07-10 | End: 2025-07-10

## 2024-07-10 NOTE — PROGRESS NOTES
"  Terence Bianchi presented for an initial Medicare AWV today. The following components were reviewed and updated:    Medical history  Family History  Social history  Allergies and Current Medications  Health Risk Assessment  Health Maintenance  Care Team    **See Completed Assessments for Annual Wellness visit with in the encounter summary    The following assessments were completed:  Depression Screening  Cognitive function Screening    Timed Get Up Test  Whisper Test    Opioid documentation:  Patient does not have a current opioid prescription.        Vitals:    07/10/24 0725 07/10/24 0804   BP: (!) 150/90 138/78   BP Location: Right arm    Patient Position: Sitting    BP Method: Medium (Manual)    Pulse: 80    Temp: 97.9 °F (36.6 °C)    SpO2: 96%    Weight: 84.4 kg (185 lb 15.3 oz)    Height: 6' 1" (1.854 m)      Body mass index is 24.53 kg/m².       Physical Exam  Vitals reviewed.   HENT:      Right Ear: Tympanic membrane, ear canal and external ear normal. There is no impacted cerumen.      Left Ear: Tympanic membrane, ear canal and external ear normal. There is no impacted cerumen.   Pulmonary:      Effort: Pulmonary effort is normal. No respiratory distress.   Neurological:      Mental Status: He is alert and oriented to person, place, and time.   Psychiatric:         Mood and Affect: Mood normal.         Behavior: Behavior normal.         Thought Content: Thought content normal.         Judgment: Judgment normal.     Diagnoses and health risks identified today and associated recommendations/orders:  1. Encounter for preventive health examination  Reviewed and discussed health maintenance.   Discussed updating vaccines -rsv, covid, shingles at pharmacy    2. Encounter for Medicare annual wellness exam  - Ambulatory Referral/Consult to Enhanced Annual Wellness Visit (eAWV)    3. 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  Unchanged- Stable- continue current treatment and follow up routinely " with PCP   - amLODIPine (NORVASC) 10 MG tablet; Take 1 tablet (10 mg total) by mouth once daily.  Dispense: 90 tablet; Refill: 3  Lipitor 40mg    4. Left sided lacunar stroke  Unchanged- Stable- continue current treatment and follow up routinely with PCP   - amLODIPine (NORVASC) 10 MG tablet; Take 1 tablet (10 mg total) by mouth once daily.  Dispense: 90 tablet; Refill: 3  Lipitor 40mg    5. Branch retinal vein occlusion of right eye with retinal neovascularization  Unchanged- Stable- continue current treatment and follow up routinely with PCP   - amLODIPine (NORVASC) 10 MG tablet; Take 1 tablet (10 mg total) by mouth once daily.  Dispense: 90 tablet; Refill: 3  Lipitor 40mg    6. Atherosclerosis of aorta  Unchanged- Stable- continue current treatment and follow up routinely with PCP   - amLODIPine (NORVASC) 10 MG tablet; Take 1 tablet (10 mg total) by mouth once daily.  Dispense: 90 tablet; Refill: 3  Lipitor 40mg    7. Hypertensive retinopathy, bilateral  Unchanged- Stable- continue current treatment and follow up routinely with PCP   - amLODIPine (NORVASC) 10 MG tablet; Take 1 tablet (10 mg total) by mouth once daily.  Dispense: 90 tablet; Refill: 3    8. Essential hypertension  Stable- continue current treatment and follow up routinely with PCP   - amLODIPine (NORVASC) 10 MG tablet; Take 1 tablet (10 mg total) by mouth once daily.  Dispense: 90 tablet; Refill: 3    9. Decreased hearing, unspecified laterality  - Ambulatory referral/consult to Audiology; Future    10. Abscess of gluteal cleft  - Ambulatory referral/consult to General Surgery; Future    11. Hemiplegia and hemiparesis following cerebral infarction affecting right dominant side  Safety issues discussed and precautions reviewed.   Neurology prn -[facility]  Unchanged- Stable- continue current treatment and follow up routinely with PCP   - amLODIPine (NORVASC) 10 MG tablet; Take 1 tablet (10 mg total) by mouth once daily.  Dispense: 90 tablet; Refill:  3  Lipitor 40mg    12. Tobacco use  Strongly encouraged to quit smoking. Reports difficulty because wife continues to smoke      Provided Terence with a 5-10 year written screening schedule and personal prevention plan. Recommendations were developed using the USPSTF age appropriate recommendations. Education, counseling, and referrals were provided as needed.  After Visit Summary printed and given to patient which includes a list of additional screenings\tests needed.    I offered to discuss advanced care planning, including how to pick a person who would make decisions for you if you were unable to make them for yourself, called a health care power of , and what kind of decisions you might make such as use of life sustaining treatments such as ventilators and tube feeding when faced with a life limiting illness recorded on a living will that they will need to know. (How you want to be cared for as you near the end of your natural life)  Patient declined a discussion regarding advance directives at this time. He will complete independently with his family   Swetha Rosado NP

## 2024-07-10 NOTE — PATIENT INSTRUCTIONS
Counseling and Referral of Other Preventative  (Italic type indicates deductible and co-insurance are waived)    Patient Name: Terence Bianchi  Today's Date: 7/10/2024    Health Maintenance       Date Due Completion Date    TETANUS VACCINE Never done ---    Shingles Vaccine (1 of 2) Never done ---    RSV Vaccine (Age 60+ and Pregnant patients) (1 - 1-dose 60+ series) Never done ---    COVID-19 Vaccine (4 - 2023-24 season) 09/01/2023 12/2/2021    Influenza Vaccine (1) 09/01/2024 9/12/2020    PROSTATE-SPECIFIC ANTIGEN 06/25/2025 6/25/2024    Hemoglobin A1c (Prediabetes) 06/25/2025 6/25/2024    High Dose Statin 07/10/2025 7/10/2024    Colorectal Cancer Screening 08/26/2025 8/26/2020    Lipid Panel 06/25/2029 6/25/2024        No orders of the defined types were placed in this encounter.      The following information is provided to all patients.  This information is to help you find resources for any of the problems found today that may be affecting your health:                  Living healthy guide: www.[facility].[place].gov      Understanding Diabetes: www.diabetes.org      Eating healthy: www.cdc.gov/healthyweight      CDC home safety checklist: www.cdc.gov/steadi/patient.html      Agency on Aging: www.goea.[place].[facility]      Alcoholics anonymous (AA): www.aa.org      Physical Activity: www.lacy.nih.gov/mi1ogvg      Tobacco use: www.quitwithusla.org

## 2024-07-17 ENCOUNTER — CLINICAL SUPPORT (OUTPATIENT)
Dept: AUDIOLOGY | Facility: CLINIC | Age: 64
End: 2024-07-17
Payer: MEDICARE

## 2024-07-17 DIAGNOSIS — H90.3 SENSORINEURAL HEARING LOSS (SNHL) OF BOTH EARS: Primary | ICD-10-CM

## 2024-07-17 DIAGNOSIS — H91.90 DECREASED HEARING, UNSPECIFIED LATERALITY: ICD-10-CM

## 2024-07-17 PROCEDURE — 92567 TYMPANOMETRY: CPT | Mod: S$GLB,,, | Performed by: AUDIOLOGIST

## 2024-07-17 PROCEDURE — 92557 COMPREHENSIVE HEARING TEST: CPT | Mod: S$GLB,,, | Performed by: AUDIOLOGIST

## 2024-07-17 PROCEDURE — 99999 PR PBB SHADOW E&M-EST. PATIENT-LVL I: CPT | Mod: PBBFAC,,, | Performed by: AUDIOLOGIST

## 2024-07-17 NOTE — PROGRESS NOTES
The patient was referred by Swetha Rosado NP for a hearing evaluation.    Report from the patient was as follows:    Difficulty Hearing/Understanding - last hearing evaluation many years ago, difficulty understanding in noise  Tinnitus - negative  History of Loud Noise Exposure -  for years  Family History of Hearing Loss - negative    Otoscopic screening revealed a clear view of TM AU    A hearing evaluation was performed today. Test results indicated a mild to moderate sensorineural hearing loss bilaterally. Impedance testing showed a Type A tympanogram in each ear, consistent with normal middle ear function.    The recommendations were as follows:    (1)  Hearing aid consult pending medical clearance  (1)  Ear protection in loud noise   (2)  Hearing evaluation in one year or sooner if hearing decrease is noted       Today's test results and recommendations were discussed with the patient.

## 2024-12-04 ENCOUNTER — TELEPHONE (OUTPATIENT)
Dept: FAMILY MEDICINE | Facility: CLINIC | Age: 64
End: 2024-12-04
Payer: MEDICARE

## 2024-12-04 NOTE — TELEPHONE ENCOUNTER
Called the patient unable to lvm to let him know Dr. Villa will not be in clinic on 12.19.24 so we will need to reschedule his appointment .

## 2025-03-17 ENCOUNTER — LAB VISIT (OUTPATIENT)
Dept: LAB | Facility: HOSPITAL | Age: 65
End: 2025-03-17
Attending: FAMILY MEDICINE
Payer: MEDICARE

## 2025-03-17 ENCOUNTER — OFFICE VISIT (OUTPATIENT)
Dept: FAMILY MEDICINE | Facility: CLINIC | Age: 65
End: 2025-03-17
Payer: MEDICARE

## 2025-03-17 VITALS
HEIGHT: 73 IN | HEART RATE: 84 BPM | BODY MASS INDEX: 24.62 KG/M2 | WEIGHT: 185.75 LBS | SYSTOLIC BLOOD PRESSURE: 150 MMHG | OXYGEN SATURATION: 98 % | DIASTOLIC BLOOD PRESSURE: 82 MMHG

## 2025-03-17 DIAGNOSIS — R14.2 BURPING: Primary | ICD-10-CM

## 2025-03-17 DIAGNOSIS — R14.0 ABDOMINAL BLOATING: ICD-10-CM

## 2025-03-17 DIAGNOSIS — K21.9 GASTROESOPHAGEAL REFLUX DISEASE WITHOUT ESOPHAGITIS: ICD-10-CM

## 2025-03-17 DIAGNOSIS — I10 ESSENTIAL HYPERTENSION: ICD-10-CM

## 2025-03-17 DIAGNOSIS — K59.09 OTHER CONSTIPATION: ICD-10-CM

## 2025-03-17 PROBLEM — I69.351 HEMIPLEGIA AND HEMIPARESIS FOLLOWING CEREBRAL INFARCTION AFFECTING RIGHT DOMINANT SIDE: Status: RESOLVED | Noted: 2024-07-10 | Resolved: 2025-03-17

## 2025-03-17 PROBLEM — H34.8311 BRANCH RETINAL VEIN OCCLUSION OF RIGHT EYE WITH RETINAL NEOVASCULARIZATION: Status: RESOLVED | Noted: 2019-07-01 | Resolved: 2025-03-17

## 2025-03-17 LAB
ALBUMIN SERPL BCP-MCNC: 3.8 G/DL (ref 3.5–5.2)
ALP SERPL-CCNC: 70 U/L (ref 40–150)
ALT SERPL W/O P-5'-P-CCNC: 12 U/L (ref 10–44)
ANION GAP SERPL CALC-SCNC: 12 MMOL/L (ref 8–16)
AST SERPL-CCNC: 15 U/L (ref 10–40)
BILIRUB SERPL-MCNC: 0.4 MG/DL (ref 0.1–1)
BUN SERPL-MCNC: 12 MG/DL (ref 8–23)
CALCIUM SERPL-MCNC: 9.4 MG/DL (ref 8.7–10.5)
CHLORIDE SERPL-SCNC: 104 MMOL/L (ref 95–110)
CO2 SERPL-SCNC: 24 MMOL/L (ref 23–29)
CREAT SERPL-MCNC: 1.2 MG/DL (ref 0.5–1.4)
ERYTHROCYTE [DISTWIDTH] IN BLOOD BY AUTOMATED COUNT: 14.7 % (ref 11.5–14.5)
EST. GFR  (NO RACE VARIABLE): >60 ML/MIN/1.73 M^2
GLUCOSE SERPL-MCNC: 93 MG/DL (ref 70–110)
HCT VFR BLD AUTO: 44.8 % (ref 40–54)
HGB BLD-MCNC: 15 G/DL (ref 14–18)
MCH RBC QN AUTO: 30.3 PG (ref 27–31)
MCHC RBC AUTO-ENTMCNC: 33.5 G/DL (ref 32–36)
MCV RBC AUTO: 91 FL (ref 82–98)
PLATELET # BLD AUTO: 224 K/UL (ref 150–450)
PMV BLD AUTO: 11.7 FL (ref 9.2–12.9)
POTASSIUM SERPL-SCNC: 4.1 MMOL/L (ref 3.5–5.1)
PROT SERPL-MCNC: 7.2 G/DL (ref 6–8.4)
RBC # BLD AUTO: 4.95 M/UL (ref 4.6–6.2)
SODIUM SERPL-SCNC: 140 MMOL/L (ref 136–145)
WBC # BLD AUTO: 10.27 K/UL (ref 3.9–12.7)

## 2025-03-17 PROCEDURE — 1125F AMNT PAIN NOTED PAIN PRSNT: CPT | Mod: CPTII,S$GLB,, | Performed by: FAMILY MEDICINE

## 2025-03-17 PROCEDURE — 36415 COLL VENOUS BLD VENIPUNCTURE: CPT | Mod: PO | Performed by: FAMILY MEDICINE

## 2025-03-17 PROCEDURE — 80053 COMPREHEN METABOLIC PANEL: CPT | Performed by: FAMILY MEDICINE

## 2025-03-17 PROCEDURE — 1159F MED LIST DOCD IN RCRD: CPT | Mod: CPTII,S$GLB,, | Performed by: FAMILY MEDICINE

## 2025-03-17 PROCEDURE — 3077F SYST BP >= 140 MM HG: CPT | Mod: CPTII,S$GLB,, | Performed by: FAMILY MEDICINE

## 2025-03-17 PROCEDURE — 85027 COMPLETE CBC AUTOMATED: CPT | Performed by: FAMILY MEDICINE

## 2025-03-17 PROCEDURE — 99214 OFFICE O/P EST MOD 30 MIN: CPT | Mod: S$GLB,,, | Performed by: FAMILY MEDICINE

## 2025-03-17 PROCEDURE — 1160F RVW MEDS BY RX/DR IN RCRD: CPT | Mod: CPTII,S$GLB,, | Performed by: FAMILY MEDICINE

## 2025-03-17 PROCEDURE — 3008F BODY MASS INDEX DOCD: CPT | Mod: CPTII,S$GLB,, | Performed by: FAMILY MEDICINE

## 2025-03-17 PROCEDURE — 3079F DIAST BP 80-89 MM HG: CPT | Mod: CPTII,S$GLB,, | Performed by: FAMILY MEDICINE

## 2025-03-17 PROCEDURE — 99999 PR PBB SHADOW E&M-EST. PATIENT-LVL III: CPT | Mod: PBBFAC,,, | Performed by: FAMILY MEDICINE

## 2025-03-17 RX ORDER — PANTOPRAZOLE SODIUM 40 MG/1
40 TABLET, DELAYED RELEASE ORAL DAILY
Qty: 30 TABLET | Refills: 1 | Status: SHIPPED | OUTPATIENT
Start: 2025-03-17 | End: 2025-05-16

## 2025-03-17 NOTE — PROGRESS NOTES
Assessment:       1. Burping    2. Other constipation    3. Essential hypertension    4. Gastroesophageal reflux disease without esophagitis    5. Abdominal bloating        Plan:       Burping:  New problem workup needed    Other constipation: New problem workup needed  -     Case Request Endoscopy: COLONOSCOPY  -     linaCLOtide (LINZESS) 72 mcg Cap capsule; Take 1 capsule (72 mcg total) by mouth before breakfast.  Dispense: 30 capsule; Refill: 2    Essential hypertension: Uncontrolled    Gastroesophageal reflux disease without esophagitis: New problem, next visit workup  -     pantoprazole (PROTONIX) 40 MG tablet; Take 1 tablet (40 mg total) by mouth once daily.  Dispense: 30 tablet; Refill: 1    Abdominal bloating: New problem, workup needed  -     Comprehensive Metabolic Panel; Future; Expected date: 03/17/2025  -     CBC Without Differential; Future; Expected date: 03/17/2025         The patient was advised to monitor the blood pressure home any if persistent elevated to let us know, he will follow-up with his primary care physician in 4 weeks secondary to symptoms of acid reflux and also hypertension.  Colonoscopy was ordered.  Will start patient on Linzess 72 mcg 1 tablet daily for constipation and pantoprazole 40 mg daily for acid reflux symptoms.   Patient agreed with assessment and plan. Patient verbalized understanding.     Subjective:       Patient ID: Terence Bianchi is a 64 y.o. male.    Chief Complaint: Constipation and GI Problem    HPI    The patient is coming here today for symptoms of abdominal bloating, burping constantly, acid reflux, constipation, the patient stated that his bowel movements are change from previous.  He is also due for colonoscopy.  He denies any symptoms of chest pain and shortness for breath at this visit.  He takes is baby aspirin at nighttime.  He also has hypertension and taking amlodipine 10 mg daily, he does not monitor the blood pressure at home.  He denies any blood in  the stools.    Past medical history, past social history was reviewed and discussed with the patient.    Review of Systems    Objective:      Physical Exam  Vitals and nursing note reviewed.   Constitutional:       General: He is not in acute distress.     Appearance: Normal appearance. He is well-developed and normal weight. He is not diaphoretic.   HENT:      Head: Normocephalic and atraumatic.      Right Ear: External ear normal.      Left Ear: External ear normal.      Nose: Nose normal.      Mouth/Throat:      Pharynx: No oropharyngeal exudate.   Eyes:      General: No scleral icterus.        Left eye: No discharge.      Pupils: Pupils are equal, round, and reactive to light.   Cardiovascular:      Rate and Rhythm: Normal rate and regular rhythm.      Heart sounds: Normal heart sounds.   Pulmonary:      Effort: Pulmonary effort is normal. No respiratory distress.      Breath sounds: Normal breath sounds. No wheezing.   Abdominal:      General: Bowel sounds are normal.      Palpations: Abdomen is soft.      Tenderness: There is no abdominal tenderness.   Musculoskeletal:      Cervical back: Normal range of motion and neck supple.   Skin:     General: Skin is warm and dry.   Neurological:      Mental Status: He is alert.      Motor: No abnormal muscle tone.   Psychiatric:         Behavior: Behavior normal.         Thought Content: Thought content normal.         Judgment: Judgment normal.

## 2025-03-18 ENCOUNTER — RESULTS FOLLOW-UP (OUTPATIENT)
Dept: FAMILY MEDICINE | Facility: CLINIC | Age: 65
End: 2025-03-18

## 2025-03-19 ENCOUNTER — TELEPHONE (OUTPATIENT)
Dept: GASTROENTEROLOGY | Facility: CLINIC | Age: 65
End: 2025-03-19
Payer: MEDICARE

## 2025-04-14 ENCOUNTER — TELEPHONE (OUTPATIENT)
Dept: FAMILY MEDICINE | Facility: CLINIC | Age: 65
End: 2025-04-14

## 2025-04-14 DIAGNOSIS — I10 ESSENTIAL HYPERTENSION: ICD-10-CM

## 2025-04-14 DIAGNOSIS — Z12.5 SCREENING FOR PROSTATE CANCER: ICD-10-CM

## 2025-04-14 DIAGNOSIS — R73.03 PREDIABETES: Primary | ICD-10-CM

## 2025-04-21 ENCOUNTER — LAB VISIT (OUTPATIENT)
Dept: LAB | Facility: HOSPITAL | Age: 65
End: 2025-04-21
Attending: INTERNAL MEDICINE
Payer: MEDICARE

## 2025-04-21 DIAGNOSIS — I10 ESSENTIAL HYPERTENSION: ICD-10-CM

## 2025-04-21 DIAGNOSIS — R73.03 PREDIABETES: ICD-10-CM

## 2025-04-21 LAB
CHOLEST SERPL-MCNC: 116 MG/DL (ref 120–199)
CHOLEST/HDLC SERPL: 2.8 {RATIO} (ref 2–5)
EAG (OHS): 114 MG/DL (ref 68–131)
HBA1C MFR BLD: 5.6 % (ref 4–5.6)
HDLC SERPL-MCNC: 42 MG/DL (ref 40–75)
HDLC SERPL: 36.2 % (ref 20–50)
LDLC SERPL CALC-MCNC: 66.6 MG/DL (ref 63–159)
NONHDLC SERPL-MCNC: 74 MG/DL
TRIGL SERPL-MCNC: 37 MG/DL (ref 30–150)

## 2025-04-21 PROCEDURE — 82465 ASSAY BLD/SERUM CHOLESTEROL: CPT

## 2025-04-21 PROCEDURE — 36415 COLL VENOUS BLD VENIPUNCTURE: CPT | Mod: PO

## 2025-04-21 PROCEDURE — 83036 HEMOGLOBIN GLYCOSYLATED A1C: CPT

## 2025-04-29 ENCOUNTER — OFFICE VISIT (OUTPATIENT)
Dept: FAMILY MEDICINE | Facility: CLINIC | Age: 65
End: 2025-04-29
Payer: MEDICARE

## 2025-04-29 VITALS
HEART RATE: 80 BPM | OXYGEN SATURATION: 97 % | SYSTOLIC BLOOD PRESSURE: 130 MMHG | DIASTOLIC BLOOD PRESSURE: 84 MMHG | HEIGHT: 73 IN | BODY MASS INDEX: 24.58 KG/M2 | WEIGHT: 185.44 LBS

## 2025-04-29 DIAGNOSIS — Z72.0 TOBACCO USE: ICD-10-CM

## 2025-04-29 DIAGNOSIS — I70.0 ATHEROSCLEROSIS OF AORTA: ICD-10-CM

## 2025-04-29 DIAGNOSIS — I10 ESSENTIAL HYPERTENSION: Primary | ICD-10-CM

## 2025-04-29 DIAGNOSIS — Z12.11 SCREENING FOR COLON CANCER: ICD-10-CM

## 2025-04-29 DIAGNOSIS — Z86.73 HISTORY OF LACUNAR CEREBROVASCULAR ACCIDENT: ICD-10-CM

## 2025-04-29 PROCEDURE — G2211 COMPLEX E/M VISIT ADD ON: HCPCS | Mod: S$GLB,,, | Performed by: INTERNAL MEDICINE

## 2025-04-29 PROCEDURE — 3008F BODY MASS INDEX DOCD: CPT | Mod: CPTII,S$GLB,, | Performed by: INTERNAL MEDICINE

## 2025-04-29 PROCEDURE — 3044F HG A1C LEVEL LT 7.0%: CPT | Mod: CPTII,S$GLB,, | Performed by: INTERNAL MEDICINE

## 2025-04-29 PROCEDURE — 99999 PR PBB SHADOW E&M-EST. PATIENT-LVL III: CPT | Mod: PBBFAC,,, | Performed by: INTERNAL MEDICINE

## 2025-04-29 PROCEDURE — 99214 OFFICE O/P EST MOD 30 MIN: CPT | Mod: S$GLB,,, | Performed by: INTERNAL MEDICINE

## 2025-04-29 PROCEDURE — 1160F RVW MEDS BY RX/DR IN RCRD: CPT | Mod: CPTII,S$GLB,, | Performed by: INTERNAL MEDICINE

## 2025-04-29 PROCEDURE — 1159F MED LIST DOCD IN RCRD: CPT | Mod: CPTII,S$GLB,, | Performed by: INTERNAL MEDICINE

## 2025-04-29 PROCEDURE — 3079F DIAST BP 80-89 MM HG: CPT | Mod: CPTII,S$GLB,, | Performed by: INTERNAL MEDICINE

## 2025-04-29 PROCEDURE — 3075F SYST BP GE 130 - 139MM HG: CPT | Mod: CPTII,S$GLB,, | Performed by: INTERNAL MEDICINE

## 2025-04-29 PROCEDURE — 1126F AMNT PAIN NOTED NONE PRSNT: CPT | Mod: CPTII,S$GLB,, | Performed by: INTERNAL MEDICINE

## 2025-04-29 PROCEDURE — 3288F FALL RISK ASSESSMENT DOCD: CPT | Mod: CPTII,S$GLB,, | Performed by: INTERNAL MEDICINE

## 2025-04-29 PROCEDURE — 1101F PT FALLS ASSESS-DOCD LE1/YR: CPT | Mod: CPTII,S$GLB,, | Performed by: INTERNAL MEDICINE

## 2025-04-29 RX ORDER — HYDROCHLOROTHIAZIDE 12.5 MG/1
12.5 TABLET ORAL EVERY MORNING
Qty: 90 TABLET | Refills: 3 | Status: SHIPPED | OUTPATIENT
Start: 2025-04-29 | End: 2026-04-29

## 2025-04-29 NOTE — PROGRESS NOTES
Patient ID: Terence Bianchi is a 64 y.o. male.    Chief Complaint: Annual Exam       Assessment and plan   Essential hypertension  -     hydroCHLOROthiazide 12.5 MG Tab; Take 1 tablet (12.5 mg total) by mouth every morning.  Dispense: 90 tablet; Refill: 3  -     Basic Metabolic Panel; Future; Expected date: 05/13/2025    Tobacco use    Atherosclerosis of aorta    Screening for colon cancer  -     Case Request Endoscopy: COLONOSCOPY, SCREENING, LOW RISK PATIENT    History of lacunar cerebrovascular accident       Assessment & Plan  Essential hypertension  Continue amlodipine 10 mg, add hydrochlorothiazide 12.5 mg.  Lip swelling with lisinopril so hesitant to start ARB  Tobacco use  Continue to recommend cessation  Atherosclerosis of aorta  Continue atorvastatin  History of lacunar cerebrovascular accident  Stable, continue statin and aspirin        History of present illness     Six-month follow-up    Essential hypertension  130s/85-90.   Tobacco use  Still smoking 1/2 ppd. states his wife smokes as well.  Not ready to quit until they both quit.  Atherosclerosis of aorta  LDL 66    Working on upgrading his porch at his house   Due for colonoscopy    Review of Systems   Constitutional:  Negative for fever.   Respiratory:  Negative for shortness of breath.    Cardiovascular:  Negative for chest pain.   Gastrointestinal:  Negative for abdominal pain.       I personally reviewed past medical, family and social history.     Objective    Vitals:    04/29/25 1443   BP: 130/84   Pulse:       Wt Readings from Last 3 Encounters:   04/29/25 1413 84.1 kg (185 lb 6.5 oz)   03/17/25 0959 84.2 kg (185 lb 11.8 oz)   07/10/24 0725 84.4 kg (185 lb 15.3 oz)      Body mass index is 24.46 kg/m².     Physical Exam  Cardiovascular:      Rate and Rhythm: Normal rate and regular rhythm.      Heart sounds: No murmur heard.     No gallop.   Pulmonary:      Breath sounds: Normal breath sounds. No wheezing or rhonchi.   Abdominal:       Palpations: Abdomen is soft.      Tenderness: There is no abdominal tenderness.        Reference     : Visit today included increased complexity associated with the care of the episodic problem Essential hypertension [I10] addressed and managing the longitudinal care of the patient due to the serious and/or complex managed problem(s)     Active Problem List with Overview Notes    Diagnosis Date Noted    History of lacunar cerebrovascular accident 04/29/2025    Atherosclerosis of aorta 07/10/2024    Coordination impairment 04/19/2024    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 04/15/2024    Left sided lacunar stroke 03/22/2024    ACP (advance care planning) 03/21/2024    Right inguinal hernia 04/22/2021    Tobacco use 09/18/2020    Hx of colonic polyps 08/26/2020    Risk for coronary artery disease greater than 20% in next 10 years 03/18/2020    Hypertensive retinopathy, bilateral 07/01/2019    NS (nuclear sclerosis), bilateral 07/01/2019    Essential hypertension 06/14/2019    Lower urinary tract symptoms (LUTS) 06/14/2019           Hypertension Medications              amLODIPine (NORVASC) 10 MG tablet Take 1 tablet (10 mg total) by mouth once daily.           Hyperlipidemia Medications              atorvastatin (LIPITOR) 40 MG tablet Take 1 tablet (40 mg total) by mouth every evening.           Medication List with Changes/Refills   New Medications    HYDROCHLOROTHIAZIDE 12.5 MG TAB    Take 1 tablet (12.5 mg total) by mouth every morning.   Current Medications    AMLODIPINE (NORVASC) 10 MG TABLET    Take 1 tablet (10 mg total) by mouth once daily.    ASPIRIN (ECOTRIN) 81 MG EC TABLET    Take 1 tablet (81 mg total) by mouth once daily.    ATORVASTATIN (LIPITOR) 40 MG TABLET    Take 1 tablet (40 mg total) by mouth every evening.    PANTOPRAZOLE (PROTONIX) 40 MG TABLET    Take 1 tablet (40 mg total) by mouth once daily.   Discontinued Medications    LINACLOTIDE (LINZESS) 72 MCG CAP CAPSULE     Take 1 capsule (72 mcg total) by mouth before breakfast.

## 2025-04-30 ENCOUNTER — TELEPHONE (OUTPATIENT)
Dept: GASTROENTEROLOGY | Facility: CLINIC | Age: 65
End: 2025-04-30
Payer: MEDICARE

## 2025-05-16 ENCOUNTER — TELEPHONE (OUTPATIENT)
Dept: FAMILY MEDICINE | Facility: CLINIC | Age: 65
End: 2025-05-16
Payer: MEDICARE

## 2025-05-16 NOTE — TELEPHONE ENCOUNTER
Called the patient to reschedule his appointment on 5.30.25 as Dr. Villa will be out of clinic no voicemail set up.

## 2025-06-03 ENCOUNTER — OFFICE VISIT (OUTPATIENT)
Dept: FAMILY MEDICINE | Facility: CLINIC | Age: 65
End: 2025-06-03
Payer: MEDICARE

## 2025-06-03 ENCOUNTER — LAB VISIT (OUTPATIENT)
Dept: LAB | Facility: HOSPITAL | Age: 65
End: 2025-06-03
Attending: INTERNAL MEDICINE
Payer: MEDICARE

## 2025-06-03 VITALS
SYSTOLIC BLOOD PRESSURE: 126 MMHG | HEIGHT: 73 IN | WEIGHT: 175.25 LBS | OXYGEN SATURATION: 96 % | DIASTOLIC BLOOD PRESSURE: 82 MMHG | HEART RATE: 94 BPM | BODY MASS INDEX: 23.23 KG/M2

## 2025-06-03 DIAGNOSIS — I10 ESSENTIAL HYPERTENSION: ICD-10-CM

## 2025-06-03 DIAGNOSIS — R63.4 WEIGHT LOSS: ICD-10-CM

## 2025-06-03 DIAGNOSIS — R10.13 EPIGASTRIC PAIN: Primary | ICD-10-CM

## 2025-06-03 LAB
ANION GAP (OHS): 10 MMOL/L (ref 8–16)
BUN SERPL-MCNC: 14 MG/DL (ref 8–23)
CALCIUM SERPL-MCNC: 9.7 MG/DL (ref 8.7–10.5)
CHLORIDE SERPL-SCNC: 102 MMOL/L (ref 95–110)
CO2 SERPL-SCNC: 28 MMOL/L (ref 23–29)
CREAT SERPL-MCNC: 1.2 MG/DL (ref 0.5–1.4)
GFR SERPLBLD CREATININE-BSD FMLA CKD-EPI: >60 ML/MIN/1.73/M2
GLUCOSE SERPL-MCNC: 96 MG/DL (ref 70–110)
POTASSIUM SERPL-SCNC: 4.3 MMOL/L (ref 3.5–5.1)
SODIUM SERPL-SCNC: 140 MMOL/L (ref 136–145)

## 2025-06-03 PROCEDURE — 80048 BASIC METABOLIC PNL TOTAL CA: CPT

## 2025-06-03 PROCEDURE — 3079F DIAST BP 80-89 MM HG: CPT | Mod: CPTII,S$GLB,, | Performed by: INTERNAL MEDICINE

## 2025-06-03 PROCEDURE — 3044F HG A1C LEVEL LT 7.0%: CPT | Mod: CPTII,S$GLB,, | Performed by: INTERNAL MEDICINE

## 2025-06-03 PROCEDURE — 1101F PT FALLS ASSESS-DOCD LE1/YR: CPT | Mod: CPTII,S$GLB,, | Performed by: INTERNAL MEDICINE

## 2025-06-03 PROCEDURE — 36415 COLL VENOUS BLD VENIPUNCTURE: CPT | Mod: PO

## 2025-06-03 PROCEDURE — 99214 OFFICE O/P EST MOD 30 MIN: CPT | Mod: S$GLB,,, | Performed by: INTERNAL MEDICINE

## 2025-06-03 PROCEDURE — 3074F SYST BP LT 130 MM HG: CPT | Mod: CPTII,S$GLB,, | Performed by: INTERNAL MEDICINE

## 2025-06-03 PROCEDURE — 99999 PR PBB SHADOW E&M-EST. PATIENT-LVL IV: CPT | Mod: PBBFAC,,, | Performed by: INTERNAL MEDICINE

## 2025-06-03 PROCEDURE — 1159F MED LIST DOCD IN RCRD: CPT | Mod: CPTII,S$GLB,, | Performed by: INTERNAL MEDICINE

## 2025-06-03 PROCEDURE — 3008F BODY MASS INDEX DOCD: CPT | Mod: CPTII,S$GLB,, | Performed by: INTERNAL MEDICINE

## 2025-06-03 PROCEDURE — G2211 COMPLEX E/M VISIT ADD ON: HCPCS | Mod: S$GLB,,, | Performed by: INTERNAL MEDICINE

## 2025-06-03 PROCEDURE — 3288F FALL RISK ASSESSMENT DOCD: CPT | Mod: CPTII,S$GLB,, | Performed by: INTERNAL MEDICINE

## 2025-06-03 PROCEDURE — 1126F AMNT PAIN NOTED NONE PRSNT: CPT | Mod: CPTII,S$GLB,, | Performed by: INTERNAL MEDICINE

## 2025-06-03 PROCEDURE — 1160F RVW MEDS BY RX/DR IN RCRD: CPT | Mod: CPTII,S$GLB,, | Performed by: INTERNAL MEDICINE

## 2025-06-05 ENCOUNTER — OFFICE VISIT (OUTPATIENT)
Dept: GASTROENTEROLOGY | Facility: CLINIC | Age: 65
End: 2025-06-05
Payer: MEDICARE

## 2025-06-05 ENCOUNTER — LAB VISIT (OUTPATIENT)
Dept: LAB | Facility: HOSPITAL | Age: 65
End: 2025-06-05
Payer: MEDICARE

## 2025-06-05 VITALS — WEIGHT: 173.94 LBS | BODY MASS INDEX: 23.05 KG/M2 | HEIGHT: 73 IN

## 2025-06-05 DIAGNOSIS — R63.4 UNINTENTIONAL WEIGHT LOSS: ICD-10-CM

## 2025-06-05 DIAGNOSIS — R10.13 EPIGASTRIC PAIN: Primary | ICD-10-CM

## 2025-06-05 DIAGNOSIS — K59.04 CHRONIC IDIOPATHIC CONSTIPATION: ICD-10-CM

## 2025-06-05 DIAGNOSIS — R10.13 EPIGASTRIC PAIN: ICD-10-CM

## 2025-06-05 PROCEDURE — 3008F BODY MASS INDEX DOCD: CPT | Mod: CPTII,S$GLB,, | Performed by: STUDENT IN AN ORGANIZED HEALTH CARE EDUCATION/TRAINING PROGRAM

## 2025-06-05 PROCEDURE — 1159F MED LIST DOCD IN RCRD: CPT | Mod: CPTII,S$GLB,, | Performed by: STUDENT IN AN ORGANIZED HEALTH CARE EDUCATION/TRAINING PROGRAM

## 2025-06-05 PROCEDURE — 87338 HPYLORI STOOL AG IA: CPT

## 2025-06-05 PROCEDURE — 1126F AMNT PAIN NOTED NONE PRSNT: CPT | Mod: CPTII,S$GLB,, | Performed by: STUDENT IN AN ORGANIZED HEALTH CARE EDUCATION/TRAINING PROGRAM

## 2025-06-05 PROCEDURE — 3288F FALL RISK ASSESSMENT DOCD: CPT | Mod: CPTII,S$GLB,, | Performed by: STUDENT IN AN ORGANIZED HEALTH CARE EDUCATION/TRAINING PROGRAM

## 2025-06-05 PROCEDURE — 3044F HG A1C LEVEL LT 7.0%: CPT | Mod: CPTII,S$GLB,, | Performed by: STUDENT IN AN ORGANIZED HEALTH CARE EDUCATION/TRAINING PROGRAM

## 2025-06-05 PROCEDURE — 99999 PR PBB SHADOW E&M-EST. PATIENT-LVL III: CPT | Mod: PBBFAC,,, | Performed by: STUDENT IN AN ORGANIZED HEALTH CARE EDUCATION/TRAINING PROGRAM

## 2025-06-05 PROCEDURE — 99204 OFFICE O/P NEW MOD 45 MIN: CPT | Mod: S$GLB,,, | Performed by: STUDENT IN AN ORGANIZED HEALTH CARE EDUCATION/TRAINING PROGRAM

## 2025-06-05 PROCEDURE — 1101F PT FALLS ASSESS-DOCD LE1/YR: CPT | Mod: CPTII,S$GLB,, | Performed by: STUDENT IN AN ORGANIZED HEALTH CARE EDUCATION/TRAINING PROGRAM

## 2025-06-05 NOTE — PROGRESS NOTES
"Subjective:       Patient ID: Terence Bianchi is a 65 y.o. male Body mass index is 22.95 kg/m².    Chief Complaint: Abdominal Pain    This patient is new to me.    HPI:  Presents today to establish care and further for further evaluation of "digestive issues"    Reports symptoms have been occurring for the last two months. He reports unintentional weight loss and early satiety. Reports bowel movements are "fairly regular". Usually has a BM every day to every other day. Every now and then has difficulty with bowel movements and has difficulty emptying his bowels. Does occasionally have to strain. Generally, his stools are a 3-4 on BSS and occasionally a 1. Reports he has symptoms similar to this in the past before he had his inguinal hernia repaired.    Has occasional ROMEO pain. He does not take any PPIs.    His last colonoscopy was in 8/2020 at which time he had 1 polyp removed which was hyperplastic. He has repeat colonoscopy scheduled for 8/2025.      Review of Systems   Constitutional:  Positive for unexpected weight change.   Gastrointestinal:  Positive for abdominal pain and constipation. Negative for blood in stool.       No LMP for male patient.  Past Medical History:   Diagnosis Date    BRVO (branch retinal vein occlusion)     OD    Cataract     High cholesterol     Hypertension     Hypertensive retinopathy of both eyes      Past Surgical History:   Procedure Laterality Date    COLONOSCOPY N/A 8/26/2020    Procedure: COLONOSCOPY;  Surgeon: Mitesh Peters MD;  Location: [facility];  Service: Endoscopy;  Laterality: N/A;    CYST REMOVAL Right     hand as a child    EYE SURGERY Right 2020    ROBOT-ASSISTED LAPAROSCOPIC REPAIR OF INGUINAL HERNIA Right 4/22/2021    Procedure: ROBOTIC REPAIR, HERNIA, INGUINAL;  Surgeon: Salvatore Shaikh MD;  [facility]: [facility];  Service: General;  Laterality: Right;     Family History   Problem Relation Name Age of Onset    Hypertension Mother      Stroke Mother      Cancer " Father      Cancer Maternal Grandmother      Amblyopia Neg Hx      Blindness Neg Hx      Cataracts Neg Hx      Diabetes Neg Hx      Glaucoma Neg Hx      Macular degeneration Neg Hx      Retinal detachment Neg Hx      Strabismus Neg Hx      Thyroid disease Neg Hx       Social History[1]  Wt Readings from Last 10 Encounters:   06/05/25 78.9 kg (173 lb 15.1 oz)   06/03/25 79.5 kg (175 lb 4.3 oz)   04/29/25 84.1 kg (185 lb 6.5 oz)   03/17/25 84.2 kg (185 lb 11.8 oz)   07/10/24 84.4 kg (185 lb 15.3 oz)   06/25/24 86.4 kg (190 lb 7.6 oz)   05/08/24 89.6 kg (197 lb 8.5 oz)   05/01/24 88.2 kg (194 lb 7.1 oz)   04/02/24 88.3 kg (194 lb 10.7 oz)   03/22/24 88 kg (194 lb)     Lab Results   Component Value Date    WBC 10.27 03/17/2025    HGB 15.0 03/17/2025    HCT 44.8 03/17/2025    MCV 91 03/17/2025     03/17/2025     CMP  Sodium   Date Value Ref Range Status   06/03/2025 140 136 - 145 mmol/L Final   03/17/2025 140 136 - 145 mmol/L Final     Potassium   Date Value Ref Range Status   06/03/2025 4.3 3.5 - 5.1 mmol/L Final   03/17/2025 4.1 3.5 - 5.1 mmol/L Final     Chloride   Date Value Ref Range Status   06/03/2025 102 95 - 110 mmol/L Final   03/17/2025 104 95 - 110 mmol/L Final     CO2   Date Value Ref Range Status   06/03/2025 28 23 - 29 mmol/L Final   03/17/2025 24 23 - 29 mmol/L Final     Glucose   Date Value Ref Range Status   06/03/2025 96 70 - 110 mg/dL Final   03/17/2025 93 70 - 110 mg/dL Final     BUN   Date Value Ref Range Status   06/03/2025 14 8 - 23 mg/dL Final     Creatinine   Date Value Ref Range Status   06/03/2025 1.2 0.5 - 1.4 mg/dL Final     Calcium   Date Value Ref Range Status   06/03/2025 9.7 8.7 - 10.5 mg/dL Final   03/17/2025 9.4 8.7 - 10.5 mg/dL Final     Total Protein   Date Value Ref Range Status   03/17/2025 7.2 6.0 - 8.4 g/dL Final     Albumin   Date Value Ref Range Status   03/17/2025 3.8 3.5 - 5.2 g/dL Final     Total Bilirubin   Date Value Ref Range Status   03/17/2025 0.4 0.1 - 1.0 mg/dL  "Final     Comment:     For infants and newborns, interpretation of results should be based  on gestational age, weight and in agreement with clinical  observations.    Premature Infant recommended reference ranges:  Up to 24 hours.............<8.0 mg/dL  Up to 48 hours............<12.0 mg/dL  3-5 days..................<15.0 mg/dL  6-29 days.................<15.0 mg/dL       Alkaline Phosphatase   Date Value Ref Range Status   03/17/2025 70 40 - 150 U/L Final     AST   Date Value Ref Range Status   03/17/2025 15 10 - 40 U/L Final     ALT   Date Value Ref Range Status   03/17/2025 12 10 - 44 U/L Final     Anion Gap   Date Value Ref Range Status   06/03/2025 10 8 - 16 mmol/L Final     eGFR if    Date Value Ref Range Status   09/24/2021 >60.0 >60 mL/min/1.73 m^2 Final     eGFR if non    Date Value Ref Range Status   09/24/2021 53.1 (A) >60 mL/min/1.73 m^2 Final     Comment:     Calculation used to obtain the estimated glomerular filtration  rate (eGFR) is the CKD-EPI equation.        No results found for: "AMYLASE"  No results found for: "LIPASE"  No results found for: "LIPASERES"  Lab Results   Component Value Date    TSH 1.600 03/21/2024       Reviewed prior medical records including endoscopy history (see surgical history).    Objective:      Physical Exam  Abdominal:      General: There is no distension.      Palpations: Abdomen is soft.      Tenderness: There is no abdominal tenderness. There is no guarding or rebound.         Assessment:       1. Epigastric pain    2. Unintentional weight loss    3. Chronic idiopathic constipation        Plan:       Epigastric pain  -     Ambulatory referral/consult to Gastroenterology    Unintentional weight loss    Chronic idiopathic constipation      -Schedule EGD  -Continue with colonoscopy as scheduled  -Complete Miralax clean out  -Start daily Metamucil  -Trial Ibsrela  -Complete previously ordered CT A/P  -Follow up in the office in 3 " months      Kuamr Thakkar DO             [1]   Social History  Tobacco Use    Smoking status: Former     Types: Cigarettes    Smokeless tobacco: Never   Substance Use Topics    Alcohol use: Never    Drug use: Not Currently

## 2025-06-06 ENCOUNTER — RESULTS FOLLOW-UP (OUTPATIENT)
Dept: FAMILY MEDICINE | Facility: CLINIC | Age: 65
End: 2025-06-06

## 2025-06-06 DIAGNOSIS — K21.9 GASTROESOPHAGEAL REFLUX DISEASE WITHOUT ESOPHAGITIS: ICD-10-CM

## 2025-06-06 DIAGNOSIS — A04.8 H. PYLORI INFECTION: Primary | ICD-10-CM

## 2025-06-06 LAB
H. PYLORI SURFACE ANTIGEN, INTERPRETATION (OHS): POSITIVE
HELICOBACTER PYLORI SURFACE ANTIGEN (OHS): 21.7

## 2025-06-06 RX ORDER — OMEPRAZOLE 20 MG/1
20 CAPSULE, DELAYED RELEASE ORAL 2 TIMES DAILY
Qty: 60 CAPSULE | Refills: 1 | Status: SHIPPED | OUTPATIENT
Start: 2025-06-06 | End: 2025-08-08

## 2025-06-06 RX ORDER — BISMUTH SUBCITRATE POTASSIUM, METRONIDAZOLE AND TETRACYCLINE HYDROCHLORIDE 140; 125; 125 MG/1; MG/1; MG/1
3 CAPSULE ORAL
Qty: 120 CAPSULE | Refills: 0 | Status: SHIPPED | OUTPATIENT
Start: 2025-06-06 | End: 2025-06-16

## 2025-06-23 DIAGNOSIS — Z00.00 ENCOUNTER FOR MEDICARE ANNUAL WELLNESS EXAM: ICD-10-CM

## 2025-07-03 ENCOUNTER — ANESTHESIA (OUTPATIENT)
Dept: ENDOSCOPY | Facility: HOSPITAL | Age: 65
End: 2025-07-03
Payer: MEDICARE

## 2025-07-03 ENCOUNTER — HOSPITAL ENCOUNTER (OUTPATIENT)
Facility: HOSPITAL | Age: 65
Discharge: HOME | End: 2025-07-03
Attending: STUDENT IN AN ORGANIZED HEALTH CARE EDUCATION/TRAINING PROGRAM | Admitting: STUDENT IN AN ORGANIZED HEALTH CARE EDUCATION/TRAINING PROGRAM
Payer: MEDICARE

## 2025-07-03 ENCOUNTER — ANESTHESIA EVENT (OUTPATIENT)
Dept: ENDOSCOPY | Facility: HOSPITAL | Age: 65
End: 2025-07-03
Payer: MEDICARE

## 2025-07-03 ENCOUNTER — TELEPHONE (OUTPATIENT)
Dept: GASTROENTEROLOGY | Facility: CLINIC | Age: 65
End: 2025-07-03
Payer: MEDICARE

## 2025-07-03 VITALS
WEIGHT: 173 LBS | DIASTOLIC BLOOD PRESSURE: 97 MMHG | BODY MASS INDEX: 22.93 KG/M2 | OXYGEN SATURATION: 97 % | HEART RATE: 64 BPM | HEIGHT: 73 IN | SYSTOLIC BLOOD PRESSURE: 160 MMHG | RESPIRATION RATE: 17 BRPM | TEMPERATURE: 98 F

## 2025-07-03 DIAGNOSIS — R10.9 ABDOMINAL PAIN, UNSPECIFIED ABDOMINAL LOCATION: ICD-10-CM

## 2025-07-03 DIAGNOSIS — R63.4 WEIGHT LOSS: ICD-10-CM

## 2025-07-03 DIAGNOSIS — A04.8 HELICOBACTER PYLORI INFECTION: Primary | ICD-10-CM

## 2025-07-03 DIAGNOSIS — R63.4 WEIGHT LOSS, UNINTENTIONAL: Primary | ICD-10-CM

## 2025-07-03 PROCEDURE — 43239 EGD BIOPSY SINGLE/MULTIPLE: CPT | Mod: PO | Performed by: STUDENT IN AN ORGANIZED HEALTH CARE EDUCATION/TRAINING PROGRAM

## 2025-07-03 PROCEDURE — 27201012 HC FORCEPS, HOT/COLD, DISP: Mod: PO | Performed by: STUDENT IN AN ORGANIZED HEALTH CARE EDUCATION/TRAINING PROGRAM

## 2025-07-03 PROCEDURE — 43239 EGD BIOPSY SINGLE/MULTIPLE: CPT | Mod: ,,, | Performed by: STUDENT IN AN ORGANIZED HEALTH CARE EDUCATION/TRAINING PROGRAM

## 2025-07-03 PROCEDURE — 63600175 PHARM REV CODE 636 W HCPCS: Mod: PO | Performed by: NURSE ANESTHETIST, CERTIFIED REGISTERED

## 2025-07-03 PROCEDURE — 63600175 PHARM REV CODE 636 W HCPCS: Mod: PO | Performed by: STUDENT IN AN ORGANIZED HEALTH CARE EDUCATION/TRAINING PROGRAM

## 2025-07-03 PROCEDURE — 37000009 HC ANESTHESIA EA ADD 15 MINS: Mod: PO | Performed by: STUDENT IN AN ORGANIZED HEALTH CARE EDUCATION/TRAINING PROGRAM

## 2025-07-03 PROCEDURE — 37000008 HC ANESTHESIA 1ST 15 MINUTES: Mod: PO | Performed by: STUDENT IN AN ORGANIZED HEALTH CARE EDUCATION/TRAINING PROGRAM

## 2025-07-03 RX ORDER — SODIUM CHLORIDE, SODIUM LACTATE, POTASSIUM CHLORIDE, CALCIUM CHLORIDE 600; 310; 30; 20 MG/100ML; MG/100ML; MG/100ML; MG/100ML
INJECTION, SOLUTION INTRAVENOUS CONTINUOUS
Status: DISCONTINUED | OUTPATIENT
Start: 2025-07-03 | End: 2025-07-03 | Stop reason: HOSPADM

## 2025-07-03 RX ORDER — PROPOFOL 10 MG/ML
VIAL (ML) INTRAVENOUS
Status: DISCONTINUED | OUTPATIENT
Start: 2025-07-03 | End: 2025-07-03

## 2025-07-03 RX ORDER — SODIUM CHLORIDE 0.9 % (FLUSH) 0.9 %
10 SYRINGE (ML) INJECTION
Status: DISCONTINUED | OUTPATIENT
Start: 2025-07-03 | End: 2025-07-03 | Stop reason: HOSPADM

## 2025-07-03 RX ORDER — VONOPRAZAN FUMARATE AND AMOXICILLIN 20MG-500MG
1 KIT ORAL DAILY
Qty: 1 EACH | Refills: 0 | Status: SHIPPED | OUTPATIENT
Start: 2025-07-03

## 2025-07-03 RX ADMIN — PROPOFOL 100 MG: 10 INJECTION, EMULSION INTRAVENOUS at 11:07

## 2025-07-03 RX ADMIN — SODIUM CHLORIDE, POTASSIUM CHLORIDE, SODIUM LACTATE AND CALCIUM CHLORIDE: 600; 310; 30; 20 INJECTION, SOLUTION INTRAVENOUS at 09:07

## 2025-07-03 RX ADMIN — SODIUM CHLORIDE, SODIUM LACTATE, POTASSIUM CHLORIDE, AND CALCIUM CHLORIDE: .6; .31; .03; .02 INJECTION, SOLUTION INTRAVENOUS at 11:07

## 2025-07-03 NOTE — PLAN OF CARE
Plan of care discussed with patient. All questions and concerns addressed and answered. Free of pain or distress. PIV removed without complications. VS stable. Procedure report and discharge instructions gone over and patient aware of restrictions and when to resume medications. Patient and spouse in agreement.

## 2025-07-03 NOTE — TRANSFER OF CARE
"Anesthesia Transfer of Care Note    Patient: Terence Bianchi    Procedure(s) Performed: Procedure(s) (LRB):  EGD (ESOPHAGOGASTRODUODENOSCOPY) (N/A)    Patient location: PACU    Anesthesia Type: general    Transport from OR: Transported from OR on room air with adequate spontaneous ventilation    Post pain: adequate analgesia    Post assessment: no apparent anesthetic complications    Post vital signs: stable    Level of consciousness: awake    Nausea/Vomiting: no nausea/vomiting    Complications: none    Transfer of care protocol was followed    Last vitals: Visit Vitals  BP (!) 177/94 (BP Location: Left arm, Patient Position: Sitting)   Pulse 73   Temp 36.2 °C (97.2 °F) (Skin)   Resp 15   Ht 6' 1" (1.854 m)   Wt 78.5 kg (173 lb)   SpO2 99%   BMI 22.82 kg/m²     "

## 2025-07-03 NOTE — ANESTHESIA POSTPROCEDURE EVALUATION
Anesthesia Post Evaluation    Patient: Terence Bianchi    Procedure(s) Performed: Procedure(s) (LRB):  EGD (ESOPHAGOGASTRODUODENOSCOPY) (N/A)    Final Anesthesia Type: general      Patient location during evaluation: PACU  Patient participation: Yes- Able to Participate  Level of consciousness: awake  Post-procedure vital signs: reviewed and stable  Pain management: adequate  Airway patency: patent    PONV status at discharge: No PONV  Anesthetic complications: no      Cardiovascular status: blood pressure returned to baseline  Respiratory status: unassisted  Hydration status: euvolemic  Follow-up not needed.              Vitals Value Taken Time   /97 07/03/25 11:52   Temp 36.5 °C (97.7 °F) 07/03/25 11:32   Pulse 64 07/03/25 11:52   Resp 17 07/03/25 11:52   SpO2 97 % 07/03/25 11:52         Event Time   Out of Recovery 12:06:05         Pain/Herbert Score: Herbert Score: 10 (7/3/2025 11:52 AM)

## 2025-07-03 NOTE — ANESTHESIA PREPROCEDURE EVALUATION
07/03/2025  Terence Bianchi is a 65 y.o., male.      Pre-op Assessment    I have reviewed the Patient Summary Reports.    I have reviewed the NPO Status.   I have reviewed the Medications.     Review of Systems  Anesthesia Hx:  No problems with previous Anesthesia                Social:  Smoker       Cardiovascular:     Hypertension          PVD hyperlipidemia                         Hypertension         Neurological:   CVA                                        Physical Exam  General: Well nourished    Airway:  Mallampati: II   Mouth Opening: Normal  TM Distance: Normal  Neck ROM: Normal ROM        Anesthesia Plan  Type of Anesthesia, risks & benefits discussed:    Anesthesia Type: Gen Natural Airway  Intra-op Monitoring Plan: Standard ASA Monitors  Induction:  IV  Informed Consent: Informed consent signed with the Patient and all parties understand the risks and agree with anesthesia plan.  All questions answered.   ASA Score: 3    Ready For Surgery From Anesthesia Perspective.     .

## 2025-07-03 NOTE — H&P
History & Physical - Short Stay  Gastroenterology      SUBJECTIVE:     Procedure: EGD    Chief Complaint/Indication for Procedure: Abdominal Pain and Weight Loss    PTA Medications   Medication Sig    amLODIPine (NORVASC) 10 MG tablet Take 1 tablet (10 mg total) by mouth once daily.    aspirin (ECOTRIN) 81 MG EC tablet Take 1 tablet (81 mg total) by mouth once daily.    atorvastatin (LIPITOR) 40 MG tablet Take 1 tablet (40 mg total) by mouth every evening.    hydroCHLOROthiazide 12.5 MG Tab Take 1 tablet (12.5 mg total) by mouth every morning.    omeprazole (PRILOSEC) 20 MG capsule Take 1 capsule (20 mg total) by mouth 2 (two) times a day.    bismuth subsalicylate (STOMACH RELIEF) 262 mg Tab Take 1 tablet by mouth four times a day for 10 days.    metroNIDAZOLE (FLAGYL) 250 MG tablet Take ½ tablet (125 mg) by mouth four times a day for 10 days.    tetracycline (ACHROMYCIN,SUMYCIN) 250 MG capsule Take 1 capsule (250 mg total) by mouth 2 (two) times a day.       Review of patient's allergies indicates:   Allergen Reactions    Lisinopril Swelling     Lips swelled        Past Medical History:   Diagnosis Date    BRVO (branch retinal vein occlusion)     OD    Cataract     High cholesterol     Hypertension     Hypertensive retinopathy of both eyes      Past Surgical History:   Procedure Laterality Date    COLONOSCOPY N/A 8/26/2020    Procedure: COLONOSCOPY;  Surgeon: Mitesh Peters MD;  Location: [facility];  Service: Endoscopy;  Laterality: N/A;    CYST REMOVAL Right     hand as a child    EYE SURGERY Right 2020    ROBOT-ASSISTED LAPAROSCOPIC REPAIR OF INGUINAL HERNIA Right 4/22/2021    Procedure: ROBOTIC REPAIR, HERNIA, INGUINAL;  Surgeon: Salvatore Shaikh MD;  Location: [facility];  Service: General;  Laterality: Right;     Family History   Problem Relation Name Age of Onset    Hypertension Mother      Stroke Mother      Cancer Father      Cancer Maternal Grandmother      Amblyopia Neg Hx      Blindness Neg Hx       Cataracts Neg Hx      Diabetes Neg Hx      Glaucoma Neg Hx      Macular degeneration Neg Hx      Retinal detachment Neg Hx      Strabismus Neg Hx      Thyroid disease Neg Hx       Social History[1]      OBJECTIVE:     Vital Signs (Most Recent)  Temp: 97.2 °F (36.2 °C) (07/03/25 0946)  Pulse: 73 (07/03/25 0946)  Resp: 15 (07/03/25 0946)  BP: (!) 177/94 (07/03/25 0946)  SpO2: 99 % (07/03/25 0946)    Physical Exam:                                                       GENERAL:  Comfortable, in no acute distress.                                 HEENT EXAM:  Nonicteric.  No adenopathy.  Oropharynx is clear.               NECK:  Supple.                                                               LUNGS:  Clear.                                                               CARDIAC:  Regular rate and rhythm.  S1, S2.  No murmur.                      ABDOMEN:  Soft, positive bowel sounds, nontender.  No hepatosplenomegaly or masses.  No rebound or guarding.                                             EXTREMITIES:  No edema.     MENTAL STATUS:  Normal, alert and oriented.      ASSESSMENT/PLAN:     Assessment: Abdominal Pain and Weight Loss    Plan: EGD    Anesthesia Plan: General    ASA Grade: ASA 2 - Patient with mild systemic disease with no functional limitations    MALLAMPATI SCORE:  I (soft palate, uvula, fauces, and tonsillar pillars visible)           [1]   Social History  Tobacco Use    Smoking status: Every Day     Types: Cigarettes    Smokeless tobacco: Never   Substance Use Topics    Alcohol use: Never    Drug use: Not Currently

## 2025-07-03 NOTE — PROVATION PATIENT INSTRUCTIONS
Discharge Summary/Instructions after an Endoscopic Procedure  Patient Name: Terence Bianchi  Patient MRN: [id number]  Patient YOB: 1960  Thursday, July 3, 2025  Kumar Thakkar DO  Dear patient,  As a result of recent federal legislation (The Federal Cures Act), you may   receive lab or pathology results from your procedure in your [facility]   account before your physician is able to contact you. Your physician or   their representative will relay the results to you with their   recommendations at their soonest availability.  Thank you,  RESTRICTIONS:  During your procedure today, you received medications for sedation.  These   medications may affect your judgment, balance and coordination.  Therefore,   for 24 hours, you have the following restrictions:   - DO NOT drive a car, operate machinery, make legal/financial decisions,   sign important papers or drink alcohol.    ACTIVITY:  Today: no heavy lifting, straining or running due to procedural   sedation/anesthesia.  The following day: return to full activity including work.  DIET:  Eat and drink normally unless instructed otherwise.     TREATMENT FOR COMMON SIDE EFFECTS:  - Mild abdominal pain, nausea, belching, bloating or excessive gas:  rest,   eat lightly and use a heating pad.  - Sore Throat: treat with throat lozenges and/or gargle with warm salt   water.  - Because air was used during the procedure, expelling large amounts of air   from your rectum or belching is normal.  - If a bowel prep was taken, you may not have a bowel movement for 1-3 days.    This is normal.  SYMPTOMS TO WATCH FOR AND REPORT TO YOUR PHYSICIAN:  1. Abdominal pain or bloating, other than gas cramps.  2. Chest pain.  3. Back pain.  4. Signs of infection such as: chills or fever occurring within 24 hours   after the procedure.  5. Rectal bleeding, which would show as bright red, maroon, or black stools.   (A tablespoon of blood from the rectum is not serious, especially  if   hemorrhoids are present.)  6. Vomiting.  7. Weakness or dizziness.  GO DIRECTLY TO THE NEAREST EMERGENCY ROOM IF YOU HAVE ANY OF THE FOLLOWING:      Difficulty breathing              Chills and/or fever over 101 F   Persistent vomiting and/or vomiting blood   Severe abdominal pain   Severe chest pain   Black, tarry stools   Bleeding- more than one tablespoon   Any other symptom or condition that you feel may need urgent attention  Your doctor recommends these additional instructions:  If any biopsies were taken, your doctors clinic will contact you in 1 to 2   weeks with any results.  None  For questions, problems or results please call your physician - Kumar Thakkar DO at Work:  [phone].  EMERGENCY PHONE NUMBER: [phone], LAB RESULTS: [phone]  IF A COMPLICATION OR EMERGENCY SITUATION ARISES AND YOU ARE UNABLE TO REACH   YOUR PHYSICIAN - GO DIRECTLY TO THE EMERGENCY ROOM.  ___________________________________________  Nurse Signature  ___________________________________________  Patient/Designated Responsible Party Signature  Kumar Thakkar DO  7/3/2025 1:36:46 PM  This report has been verified and signed electronically.  Dear patient,  As a result of recent federal legislation (The Federal Cures Act), you may   receive lab or pathology results from your procedure in your [facility]   account before your physician is able to contact you. Your physician or   their representative will relay the results to you with their   recommendations at their soonest availability.  Thank you.  PROVATION

## 2025-07-14 ENCOUNTER — TELEPHONE (OUTPATIENT)
Dept: FAMILY MEDICINE | Facility: CLINIC | Age: 65
End: 2025-07-14
Payer: MEDICARE

## 2025-07-14 DIAGNOSIS — R26.89 IMPAIRED BALANCE AS LATE EFFECT OF CEREBROVASCULAR ACCIDENT: ICD-10-CM

## 2025-07-14 DIAGNOSIS — I10 ESSENTIAL HYPERTENSION: ICD-10-CM

## 2025-07-14 DIAGNOSIS — I63.81 LEFT SIDED LACUNAR STROKE: ICD-10-CM

## 2025-07-14 DIAGNOSIS — I69.398 IMPAIRED BALANCE AS LATE EFFECT OF CEREBROVASCULAR ACCIDENT: ICD-10-CM

## 2025-07-14 RX ORDER — HYDROCHLOROTHIAZIDE 12.5 MG/1
12.5 TABLET ORAL EVERY MORNING
Qty: 90 TABLET | Refills: 3 | Status: SHIPPED | OUTPATIENT
Start: 2025-07-14 | End: 2026-07-14

## 2025-07-14 RX ORDER — ATORVASTATIN CALCIUM 40 MG/1
40 TABLET, FILM COATED ORAL NIGHTLY
Qty: 90 TABLET | Refills: 3 | Status: SHIPPED | OUTPATIENT
Start: 2025-07-14

## 2025-07-14 RX ORDER — AMLODIPINE BESYLATE 10 MG/1
10 TABLET ORAL DAILY
Qty: 90 TABLET | Refills: 3 | Status: SHIPPED | OUTPATIENT
Start: 2025-07-14

## 2025-07-14 NOTE — TELEPHONE ENCOUNTER
Refill Routing Note   Medication(s) are not appropriate for processing by [facility] for the following reason(s):        No active prescription written by provider    [facility] action(s):                  Appointments  past 12m or future 3m with PCP    Date Provider   Last Visit   6/3/2025 Juan Ramon Villa, DO   Next Visit   7/14/2025 Juan Ramon Villa, DO   ED visits in past 90 days: 0        Note composed:4:04 PM 07/14/2025

## 2025-07-14 NOTE — TELEPHONE ENCOUNTER
See message.  Let patient know that I sent prescriptions to [facility]    My name is Lyssa Che, and I serve as a Patient  at the [facility]. Patient Terence Bianchi contacted me this morning stating he is in need of a prescription refill because currently he is out of blood pressure medication. If possible, could a team member kindly reach out to the patient to follow up on his request?     Thank you for your attention to this matter, and have a wonderful day.

## 2025-07-14 NOTE — TELEPHONE ENCOUNTER
No care due was identified.  [facility] Embedded Care Due Messages. Reference number: [id number].   7/14/2025 4:04:34 PM CDT

## 2025-07-15 ENCOUNTER — RESULTS FOLLOW-UP (OUTPATIENT)
Dept: GASTROENTEROLOGY | Facility: CLINIC | Age: 65
End: 2025-07-15
Payer: MEDICARE

## 2025-07-16 ENCOUNTER — TELEPHONE (OUTPATIENT)
Dept: GASTROENTEROLOGY | Facility: CLINIC | Age: 65
End: 2025-07-16
Payer: MEDICARE

## 2025-07-23 ENCOUNTER — OFFICE VISIT (OUTPATIENT)
Dept: FAMILY MEDICINE | Facility: CLINIC | Age: 65
End: 2025-07-23
Payer: MEDICARE

## 2025-07-23 VITALS
RESPIRATION RATE: 17 BRPM | BODY MASS INDEX: 23.53 KG/M2 | HEART RATE: 85 BPM | OXYGEN SATURATION: 99 % | DIASTOLIC BLOOD PRESSURE: 90 MMHG | HEIGHT: 73 IN | SYSTOLIC BLOOD PRESSURE: 150 MMHG | WEIGHT: 177.56 LBS | TEMPERATURE: 98 F

## 2025-07-23 DIAGNOSIS — Z00.00 ENCOUNTER FOR MEDICARE ANNUAL WELLNESS EXAM: Primary | ICD-10-CM

## 2025-07-23 DIAGNOSIS — I10 ESSENTIAL HYPERTENSION: ICD-10-CM

## 2025-07-23 DIAGNOSIS — R26.89 IMPAIRED BALANCE AS LATE EFFECT OF CEREBROVASCULAR ACCIDENT: ICD-10-CM

## 2025-07-23 DIAGNOSIS — I69.398 IMPAIRED BALANCE AS LATE EFFECT OF CEREBROVASCULAR ACCIDENT: ICD-10-CM

## 2025-07-23 DIAGNOSIS — I70.0 ATHEROSCLEROSIS OF AORTA: ICD-10-CM

## 2025-07-23 PROCEDURE — 1126F AMNT PAIN NOTED NONE PRSNT: CPT | Mod: CPTII,S$GLB,, | Performed by: NURSE PRACTITIONER

## 2025-07-23 PROCEDURE — 3077F SYST BP >= 140 MM HG: CPT | Mod: CPTII,S$GLB,, | Performed by: NURSE PRACTITIONER

## 2025-07-23 PROCEDURE — 1160F RVW MEDS BY RX/DR IN RCRD: CPT | Mod: CPTII,S$GLB,, | Performed by: NURSE PRACTITIONER

## 2025-07-23 PROCEDURE — 3080F DIAST BP >= 90 MM HG: CPT | Mod: CPTII,S$GLB,, | Performed by: NURSE PRACTITIONER

## 2025-07-23 PROCEDURE — 1159F MED LIST DOCD IN RCRD: CPT | Mod: CPTII,S$GLB,, | Performed by: NURSE PRACTITIONER

## 2025-07-23 PROCEDURE — 3288F FALL RISK ASSESSMENT DOCD: CPT | Mod: CPTII,S$GLB,, | Performed by: NURSE PRACTITIONER

## 2025-07-23 PROCEDURE — 1124F ACP DISCUSS-NO DSCNMKR DOCD: CPT | Mod: CPTII,S$GLB,, | Performed by: NURSE PRACTITIONER

## 2025-07-23 PROCEDURE — 3044F HG A1C LEVEL LT 7.0%: CPT | Mod: CPTII,S$GLB,, | Performed by: NURSE PRACTITIONER

## 2025-07-23 PROCEDURE — 1101F PT FALLS ASSESS-DOCD LE1/YR: CPT | Mod: CPTII,S$GLB,, | Performed by: NURSE PRACTITIONER

## 2025-07-23 PROCEDURE — G0439 PPPS, SUBSEQ VISIT: HCPCS | Mod: S$GLB,,, | Performed by: NURSE PRACTITIONER

## 2025-07-23 PROCEDURE — 1170F FXNL STATUS ASSESSED: CPT | Mod: CPTII,S$GLB,, | Performed by: NURSE PRACTITIONER

## 2025-07-23 NOTE — PROGRESS NOTES
"  Terence Bianchi presented for an initial Medicare AWV today. The following components were reviewed and updated:    Medical history  Family History  Social history  Allergies and Current Medications  Health Risk Assessment  Health Maintenance  Care Team    **See Completed Assessments for Annual Wellness visit with in the encounter summary    The following assessments were completed:  Depression Screening  Cognitive function Screening    Timed Get Up Test  Whisper Test    Opioid documentation:  Patient does not have a current opioid prescription.        Vitals:    07/23/25 0935   BP: (!) 150/90   BP Location: Left arm   Patient Position: Sitting   Pulse: 85   Resp: 17   Temp: 98.3 °F (36.8 °C)   TempSrc: Oral   SpO2: 99%   Weight: 80.6 kg (177 lb 9.3 oz)   Height: 6' 1" (1.854 m)     Body mass index is 23.43 kg/m².     Physical Exam  Vitals reviewed.   Pulmonary:      Effort: Pulmonary effort is normal. No respiratory distress.   Neurological:      Mental Status: He is alert and oriented to person, place, and time.   Psychiatric:         Mood and Affect: Mood normal.         Behavior: Behavior normal.         Thought Content: Thought content normal.         Judgment: Judgment normal.     Diagnoses and health risks identified today and associated recommendations/orders:  1. Encounter for Medicare annual wellness exam  Reviewed and discussed health maintenance.    - Referral to Enhanced Annual Wellness Visit (eAWV) M+1  Colonoscopy scheduled 8/28/25    2. Atherosclerosis of aorta  Stable- continue current treatment and follow up routinely with PCP   On a statin    3. Essential hypertension  Elevated today. Has a follow up with pcp  Continue norvasc 10mg daily and hydrochlorothiazide 12.5mg daily  Monitor home bp readings and keep a log    4. 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 Recommendations were developed using the USPSTF age appropriate recommendations. Education, counseling, and referrals were provided as needed.  After Visit Summary printed and given to patient which includes a list of additional screenings\tests needed.    I offered to discuss advanced care planning, including how to pick a person who would make decisions for you if you were unable to make them for yourself, called a health care power of , and what kind of decisions you might make such as use of life sustaining treatments such as ventilators and tube feeding when faced with a life limiting illness recorded on a living will that they will need to know. (How you want to be cared for as you near the end of your natural life)  Patient declined a discussion regarding advance directives at this time. He will complete independently with his family   Swetha Rosado NP

## 2025-07-23 NOTE — PATIENT INSTRUCTIONS
Counseling and Referral of Other Preventative  (Italic type indicates deductible and co-insurance are waived)    Patient Name: Terence Bianchi  Today's Date: 7/23/2025    Health Maintenance       Date Due Completion Date    TETANUS VACCINE Never done ---    Shingles Vaccine (1 of 2) Never done ---    RSV Vaccine (Age 60+ and Pregnant patients) (1 - Risk 60-74 years 1-dose series) Never done ---    COVID-19 Vaccine (4 - 2024-25 season) 09/01/2024 12/2/2021    PROSTATE-SPECIFIC ANTIGEN 06/25/2025 6/25/2024    Colorectal Cancer Screening 08/26/2025 8/26/2020    Influenza Vaccine (1) 09/01/2025 9/12/2020    High Dose Statin 07/14/2026 7/14/2025    Lipid Panel 04/21/2030 4/21/2025        No orders of the defined types were placed in this encounter.      The following information is provided to all patients.  This information is to help you find resources for any of the problems found today that may be affecting your health:                  Living healthy guide: www.[facility].[place].gov      Understanding Diabetes: www.diabetes.org      Eating healthy: www.cdc.gov/healthyweight      CDC home safety checklist: www.cdc.gov/steadi/patient.html      Agency on Aging: www.goea.[place].gov      Alcoholics anonymous (AA): www.aa.org      Physical Activity: www.lacy.nih.gov/rf9spjq      Tobacco use: www.quitwithusla.org

## 2025-07-29 RX ORDER — ATORVASTATIN CALCIUM 40 MG/1
40 TABLET, FILM COATED ORAL NIGHTLY
Qty: 90 TABLET | Refills: 2 | Status: SHIPPED | OUTPATIENT
Start: 2025-07-29

## 2025-07-29 NOTE — TELEPHONE ENCOUNTER
Refill Decision Note   eTrence Bianchi  is requesting a refill authorization.  Brief Assessment and Rationale for Refill:  Approve     Medication Therapy Plan:        Comments:     Note composed:4:38 PM 07/29/2025

## 2025-08-22 ENCOUNTER — TELEPHONE (OUTPATIENT)
Dept: SURGERY | Facility: HOSPITAL | Age: 65
End: 2025-08-22
Payer: MEDICARE

## 2025-08-25 ENCOUNTER — TELEPHONE (OUTPATIENT)
Dept: GASTROENTEROLOGY | Facility: CLINIC | Age: 65
End: 2025-08-25
Payer: MEDICARE

## 2025-08-28 ENCOUNTER — TELEPHONE (OUTPATIENT)
Dept: SURGERY | Facility: HOSPITAL | Age: 65
End: 2025-08-28
Payer: MEDICARE

## 2025-09-04 ENCOUNTER — ANESTHESIA (OUTPATIENT)
Dept: ENDOSCOPY | Facility: HOSPITAL | Age: 65
End: 2025-09-04
Payer: MEDICARE

## 2025-09-04 ENCOUNTER — ANESTHESIA EVENT (OUTPATIENT)
Dept: ENDOSCOPY | Facility: HOSPITAL | Age: 65
End: 2025-09-04
Payer: MEDICARE

## 2025-09-04 PROCEDURE — 63600175 PHARM REV CODE 636 W HCPCS: Mod: PO | Performed by: NURSE ANESTHETIST, CERTIFIED REGISTERED

## 2025-09-04 RX ORDER — LIDOCAINE HYDROCHLORIDE 20 MG/ML
INJECTION INTRAVENOUS
Status: DISCONTINUED | OUTPATIENT
Start: 2025-09-04 | End: 2025-09-04

## 2025-09-04 RX ORDER — PROPOFOL 10 MG/ML
VIAL (ML) INTRAVENOUS
Status: DISCONTINUED | OUTPATIENT
Start: 2025-09-04 | End: 2025-09-04

## 2025-09-04 RX ADMIN — LIDOCAINE HYDROCHLORIDE 100 MG: 20 INJECTION INTRAVENOUS at 11:09

## 2025-09-04 RX ADMIN — PROPOFOL 50 MG: 10 INJECTION, EMULSION INTRAVENOUS at 11:09

## 2025-09-04 RX ADMIN — PROPOFOL 100 MG: 10 INJECTION, EMULSION INTRAVENOUS at 11:09

## (undated) DEVICE — KIT WING PAD POSITIONING

## (undated) DEVICE — TROCAR KII FIOS 12MM X 100MM

## (undated) DEVICE — DRAPE INCISE IOBAN 2 23X17IN

## (undated) DEVICE — SOL CLEARIFY VISUALIZATION LAP

## (undated) DEVICE — PACK CUSTOM UNIV BASIN SLI

## (undated) DEVICE — SOL ELECTROLUBE ANTI-STIC

## (undated) DEVICE — SET TUBE PNEUMOCLEAR SE HI FLO

## (undated) DEVICE — GLOVE SURG ULTRA TOUCH 7.5

## (undated) DEVICE — SOL 9P NACL IRR PIC IL

## (undated) DEVICE — UNDERGLOVES BIOGEL PI SZ 7 LF

## (undated) DEVICE — OBTURATOR BLADELESS 8MM XI CLR

## (undated) DEVICE — SYR 10CC LUER LOCK

## (undated) DEVICE — SEE MEDLINE ITEM 146292

## (undated) DEVICE — STRAP OR TABLE 5IN X 72IN

## (undated) DEVICE — COVER TIP CURVED SCISSORS XI

## (undated) DEVICE — SEE MEDLINE ITEM 152622

## (undated) DEVICE — GLOVE SURG ULTRA TOUCH 7

## (undated) DEVICE — APPLICATOR CHLORAPREP ORN 26ML

## (undated) DEVICE — SUT MONOCRYL 4-0 PS-2

## (undated) DEVICE — DRAPE ABDOMINAL TIBURON 14X11

## (undated) DEVICE — ELECTRODE BLADE INSULATED 1 IN

## (undated) DEVICE — SLEEVE SCD EXPRESS KNEE MEDIUM

## (undated) DEVICE — SUT CTD VICRYL VIL BR SH 27

## (undated) DEVICE — CLOSURE SKIN STERI STRIP 1/4X4

## (undated) DEVICE — CLOSURE SKIN STERI STRIP 1/2X4

## (undated) DEVICE — DRAPE ARM DAVINCI XI

## (undated) DEVICE — DRAPE COLUMN DAVINCI XI

## (undated) DEVICE — SEAL UNIVERSAL 5MM-8MM XI

## (undated) DEVICE — SEE MEDLINE ITEM 157117

## (undated) DEVICE — Device

## (undated) DEVICE — ELECTRODE REM PLYHSV RETURN 9

## (undated) DEVICE — SUT STRATAFIX PDO 2-0 SH

## (undated) DEVICE — NDL SAFETY 21G X 1 1/2 ECLPSE